# Patient Record
Sex: MALE | Race: BLACK OR AFRICAN AMERICAN | NOT HISPANIC OR LATINO | URBAN - METROPOLITAN AREA
[De-identification: names, ages, dates, MRNs, and addresses within clinical notes are randomized per-mention and may not be internally consistent; named-entity substitution may affect disease eponyms.]

---

## 2019-11-12 ENCOUNTER — INPATIENT (INPATIENT)
Facility: HOSPITAL | Age: 59
LOS: 2 days | Discharge: HOME | End: 2019-11-15
Attending: HOSPITALIST | Admitting: HOSPITALIST
Payer: SELF-PAY

## 2019-11-12 VITALS
TEMPERATURE: 99 F | SYSTOLIC BLOOD PRESSURE: 113 MMHG | DIASTOLIC BLOOD PRESSURE: 65 MMHG | RESPIRATION RATE: 16 BRPM | OXYGEN SATURATION: 100 % | HEART RATE: 122 BPM

## 2019-11-12 DIAGNOSIS — K92.2 GASTROINTESTINAL HEMORRHAGE, UNSPECIFIED: ICD-10-CM

## 2019-11-12 LAB
ALBUMIN SERPL ELPH-MCNC: 3.3 G/DL — SIGNIFICANT CHANGE UP (ref 3.3–5)
ALP SERPL-CCNC: 131 U/L — HIGH (ref 40–120)
ALT FLD-CCNC: 34 U/L — SIGNIFICANT CHANGE UP (ref 4–41)
ANION GAP SERPL CALC-SCNC: 16 MMO/L — HIGH (ref 7–14)
ANISOCYTOSIS BLD QL: SLIGHT — SIGNIFICANT CHANGE UP
APAP SERPL-MCNC: < 15 UG/ML — LOW (ref 15–25)
APTT BLD: 29 SEC — SIGNIFICANT CHANGE UP (ref 27.5–36.3)
AST SERPL-CCNC: 143 U/L — HIGH (ref 4–40)
BASE EXCESS BLDV CALC-SCNC: 2.6 MMOL/L — SIGNIFICANT CHANGE UP
BASE EXCESS BLDV CALC-SCNC: 5.3 MMOL/L — SIGNIFICANT CHANGE UP
BASOPHILS # BLD AUTO: 0.03 K/UL — SIGNIFICANT CHANGE UP (ref 0–0.2)
BASOPHILS NFR BLD AUTO: 0.3 % — SIGNIFICANT CHANGE UP (ref 0–2)
BILIRUB SERPL-MCNC: 1.7 MG/DL — HIGH (ref 0.2–1.2)
BLD GP AB SCN SERPL QL: NEGATIVE — SIGNIFICANT CHANGE UP
BLOOD GAS VENOUS - CREATININE: 0.64 MG/DL — SIGNIFICANT CHANGE UP (ref 0.5–1.3)
BLOOD GAS VENOUS - CREATININE: 0.72 MG/DL — SIGNIFICANT CHANGE UP (ref 0.5–1.3)
BLOOD GAS VENOUS - FIO2: 100 — SIGNIFICANT CHANGE UP
BUN SERPL-MCNC: 16 MG/DL — SIGNIFICANT CHANGE UP (ref 7–23)
CALCIUM SERPL-MCNC: 8.9 MG/DL — SIGNIFICANT CHANGE UP (ref 8.4–10.5)
CHLORIDE BLDV-SCNC: 104 MMOL/L — SIGNIFICANT CHANGE UP (ref 96–108)
CHLORIDE BLDV-SCNC: 98 MMOL/L — SIGNIFICANT CHANGE UP (ref 96–108)
CHLORIDE SERPL-SCNC: 95 MMOL/L — LOW (ref 98–107)
CO2 SERPL-SCNC: 28 MMOL/L — SIGNIFICANT CHANGE UP (ref 22–31)
CREAT SERPL-MCNC: 0.69 MG/DL — SIGNIFICANT CHANGE UP (ref 0.5–1.3)
EOSINOPHIL # BLD AUTO: 0 K/UL — SIGNIFICANT CHANGE UP (ref 0–0.5)
EOSINOPHIL NFR BLD AUTO: 0 % — SIGNIFICANT CHANGE UP (ref 0–6)
ETHANOL BLD-MCNC: < 10 MG/DL — SIGNIFICANT CHANGE UP
GAS PNL BLDV: 138 MMOL/L — SIGNIFICANT CHANGE UP (ref 136–146)
GAS PNL BLDV: 140 MMOL/L — SIGNIFICANT CHANGE UP (ref 136–146)
GLUCOSE BLDV-MCNC: 129 MG/DL — HIGH (ref 70–99)
GLUCOSE BLDV-MCNC: 82 MG/DL — SIGNIFICANT CHANGE UP (ref 70–99)
GLUCOSE SERPL-MCNC: 141 MG/DL — HIGH (ref 70–99)
HCO3 BLDV-SCNC: 26 MMOL/L — SIGNIFICANT CHANGE UP (ref 20–27)
HCO3 BLDV-SCNC: 28 MMOL/L — HIGH (ref 20–27)
HCT VFR BLD CALC: 25.1 % — LOW (ref 39–50)
HCT VFR BLDV CALC: 20.7 % — CRITICAL LOW (ref 39–51)
HCT VFR BLDV CALC: 26.8 % — LOW (ref 39–51)
HGB BLD-MCNC: 8.5 G/DL — LOW (ref 13–17)
HGB BLDV-MCNC: 6.6 G/DL — CRITICAL LOW (ref 13–17)
HGB BLDV-MCNC: 8.6 G/DL — LOW (ref 13–17)
HYPOCHROMIA BLD QL: SLIGHT — SIGNIFICANT CHANGE UP
IMM GRANULOCYTES NFR BLD AUTO: 0.4 % — SIGNIFICANT CHANGE UP (ref 0–1.5)
INR BLD: 1.73 — HIGH (ref 0.88–1.17)
LACTATE BLDV-MCNC: 2.4 MMOL/L — HIGH (ref 0.5–2)
LACTATE BLDV-MCNC: 4.2 MMOL/L — CRITICAL HIGH (ref 0.5–2)
LIDOCAIN IGE QN: 29.5 U/L — SIGNIFICANT CHANGE UP (ref 7–60)
LYMPHOCYTES # BLD AUTO: 1.97 K/UL — SIGNIFICANT CHANGE UP (ref 1–3.3)
LYMPHOCYTES # BLD AUTO: 18.4 % — SIGNIFICANT CHANGE UP (ref 13–44)
MAGNESIUM SERPL-MCNC: 1.2 MG/DL — LOW (ref 1.6–2.6)
MANUAL SMEAR VERIFICATION: SIGNIFICANT CHANGE UP
MCHC RBC-ENTMCNC: 33.5 PG — SIGNIFICANT CHANGE UP (ref 27–34)
MCHC RBC-ENTMCNC: 33.9 % — SIGNIFICANT CHANGE UP (ref 32–36)
MCV RBC AUTO: 98.8 FL — SIGNIFICANT CHANGE UP (ref 80–100)
MONOCYTES # BLD AUTO: 1.02 K/UL — HIGH (ref 0–0.9)
MONOCYTES NFR BLD AUTO: 9.5 % — SIGNIFICANT CHANGE UP (ref 2–14)
NEUTROPHILS # BLD AUTO: 7.66 K/UL — HIGH (ref 1.8–7.4)
NEUTROPHILS NFR BLD AUTO: 71.4 % — SIGNIFICANT CHANGE UP (ref 43–77)
NRBC # FLD: 0 K/UL — SIGNIFICANT CHANGE UP (ref 0–0)
PCO2 BLDV: 41 MMHG — SIGNIFICANT CHANGE UP (ref 41–51)
PCO2 BLDV: 44 MMHG — SIGNIFICANT CHANGE UP (ref 41–51)
PH BLDV: 7.41 PH — SIGNIFICANT CHANGE UP (ref 7.32–7.43)
PH BLDV: 7.46 PH — HIGH (ref 7.32–7.43)
PHOSPHATE SERPL-MCNC: 3.3 MG/DL — SIGNIFICANT CHANGE UP (ref 2.5–4.5)
PLATELET # BLD AUTO: 67 K/UL — LOW (ref 150–400)
PLATELET COUNT - ESTIMATE: SIGNIFICANT CHANGE UP
PMV BLD: 11.1 FL — SIGNIFICANT CHANGE UP (ref 7–13)
PO2 BLDV: 27 MMHG — LOW (ref 35–40)
PO2 BLDV: 46 MMHG — HIGH (ref 35–40)
POLYCHROMASIA BLD QL SMEAR: SLIGHT — SIGNIFICANT CHANGE UP
POTASSIUM BLDV-SCNC: 4.2 MMOL/L — SIGNIFICANT CHANGE UP (ref 3.4–4.5)
POTASSIUM BLDV-SCNC: 4.2 MMOL/L — SIGNIFICANT CHANGE UP (ref 3.4–4.5)
POTASSIUM SERPL-MCNC: 4.4 MMOL/L — SIGNIFICANT CHANGE UP (ref 3.5–5.3)
POTASSIUM SERPL-SCNC: 4.4 MMOL/L — SIGNIFICANT CHANGE UP (ref 3.5–5.3)
PROT SERPL-MCNC: 6.7 G/DL — SIGNIFICANT CHANGE UP (ref 6–8.3)
PROTHROM AB SERPL-ACNC: 19.5 SEC — HIGH (ref 9.8–13.1)
RBC # BLD: 2.54 M/UL — LOW (ref 4.2–5.8)
RBC # FLD: 15.9 % — HIGH (ref 10.3–14.5)
RH IG SCN BLD-IMP: POSITIVE — SIGNIFICANT CHANGE UP
SALICYLATES SERPL-MCNC: < 5 MG/DL — LOW (ref 15–30)
SAO2 % BLDV: 37.9 % — LOW (ref 60–85)
SAO2 % BLDV: 73.5 % — SIGNIFICANT CHANGE UP (ref 60–85)
SODIUM SERPL-SCNC: 139 MMOL/L — SIGNIFICANT CHANGE UP (ref 135–145)
WBC # BLD: 10.72 K/UL — HIGH (ref 3.8–10.5)
WBC # FLD AUTO: 10.72 K/UL — HIGH (ref 3.8–10.5)

## 2019-11-12 PROCEDURE — 76705 ECHO EXAM OF ABDOMEN: CPT | Mod: 26

## 2019-11-12 PROCEDURE — 74174 CTA ABD&PLVS W/CONTRAST: CPT | Mod: 26

## 2019-11-12 PROCEDURE — 99223 1ST HOSP IP/OBS HIGH 75: CPT

## 2019-11-12 RX ORDER — MORPHINE SULFATE 50 MG/1
4 CAPSULE, EXTENDED RELEASE ORAL ONCE
Refills: 0 | Status: DISCONTINUED | OUTPATIENT
Start: 2019-11-12 | End: 2019-11-12

## 2019-11-12 RX ORDER — INFLUENZA VIRUS VACCINE 15; 15; 15; 15 UG/.5ML; UG/.5ML; UG/.5ML; UG/.5ML
0.5 SUSPENSION INTRAMUSCULAR ONCE
Refills: 0 | Status: DISCONTINUED | OUTPATIENT
Start: 2019-11-12 | End: 2019-11-15

## 2019-11-12 RX ORDER — CEFTRIAXONE 500 MG/1
1000 INJECTION, POWDER, FOR SOLUTION INTRAMUSCULAR; INTRAVENOUS ONCE
Refills: 0 | Status: COMPLETED | OUTPATIENT
Start: 2019-11-12 | End: 2019-11-12

## 2019-11-12 RX ORDER — SODIUM CHLORIDE 9 MG/ML
1000 INJECTION INTRAMUSCULAR; INTRAVENOUS; SUBCUTANEOUS ONCE
Refills: 0 | Status: COMPLETED | OUTPATIENT
Start: 2019-11-12 | End: 2019-11-12

## 2019-11-12 RX ORDER — PANTOPRAZOLE SODIUM 20 MG/1
8 TABLET, DELAYED RELEASE ORAL
Qty: 80 | Refills: 0 | Status: DISCONTINUED | OUTPATIENT
Start: 2019-11-12 | End: 2019-11-13

## 2019-11-12 RX ORDER — OCTREOTIDE ACETATE 200 UG/ML
50 INJECTION, SOLUTION INTRAVENOUS; SUBCUTANEOUS ONCE
Refills: 0 | Status: COMPLETED | OUTPATIENT
Start: 2019-11-12 | End: 2019-11-12

## 2019-11-12 RX ORDER — ONDANSETRON 8 MG/1
4 TABLET, FILM COATED ORAL ONCE
Refills: 0 | Status: COMPLETED | OUTPATIENT
Start: 2019-11-12 | End: 2019-11-12

## 2019-11-12 RX ORDER — MAGNESIUM SULFATE 500 MG/ML
2 VIAL (ML) INJECTION ONCE
Refills: 0 | Status: COMPLETED | OUTPATIENT
Start: 2019-11-12 | End: 2019-11-12

## 2019-11-12 RX ORDER — OCTREOTIDE ACETATE 200 UG/ML
50 INJECTION, SOLUTION INTRAVENOUS; SUBCUTANEOUS
Qty: 500 | Refills: 0 | Status: DISCONTINUED | OUTPATIENT
Start: 2019-11-12 | End: 2019-11-13

## 2019-11-12 RX ORDER — CEFTRIAXONE 500 MG/1
1000 INJECTION, POWDER, FOR SOLUTION INTRAMUSCULAR; INTRAVENOUS ONCE
Refills: 0 | Status: DISCONTINUED | OUTPATIENT
Start: 2019-11-12 | End: 2019-11-12

## 2019-11-12 RX ADMIN — SODIUM CHLORIDE 1000 MILLILITER(S): 9 INJECTION INTRAMUSCULAR; INTRAVENOUS; SUBCUTANEOUS at 19:27

## 2019-11-12 RX ADMIN — OCTREOTIDE ACETATE 10 MICROGRAM(S)/HR: 200 INJECTION, SOLUTION INTRAVENOUS; SUBCUTANEOUS at 23:52

## 2019-11-12 RX ADMIN — ONDANSETRON 4 MILLIGRAM(S): 8 TABLET, FILM COATED ORAL at 17:10

## 2019-11-12 RX ADMIN — Medication 50 GRAM(S): at 21:21

## 2019-11-12 RX ADMIN — CEFTRIAXONE 100 MILLIGRAM(S): 500 INJECTION, POWDER, FOR SOLUTION INTRAMUSCULAR; INTRAVENOUS at 20:44

## 2019-11-12 RX ADMIN — SODIUM CHLORIDE 1000 MILLILITER(S): 9 INJECTION INTRAMUSCULAR; INTRAVENOUS; SUBCUTANEOUS at 17:10

## 2019-11-12 RX ADMIN — PANTOPRAZOLE SODIUM 10 MG/HR: 20 TABLET, DELAYED RELEASE ORAL at 17:34

## 2019-11-12 RX ADMIN — OCTREOTIDE ACETATE 50 MICROGRAM(S): 200 INJECTION, SOLUTION INTRAVENOUS; SUBCUTANEOUS at 19:37

## 2019-11-12 NOTE — H&P ADULT - PROBLEM SELECTOR PLAN 1
-2/2 hematemesis 2/2 variceal bleed Vs boerhaave syndrome Vs Danielle-Carpio tear Vs PUD  -NPO  -c/w PPI gtt  -c/w octreotide gtt  -2 large bore IV  -c/w IVF  -monitor hgb  -GI consult  -repeat cbc now 2/2 hematemesis 2/2 variceal bleed Vs Danielle-Carpio tear Vs PUD Vs Boerhaave syndrome. Currently HD stable.   -C/w protonix gtt  -C/w octreotide gtt for now, as pt with history of long history of excessive alcohol intake and labs indicating liver synthetic dysfunction, thus ?cirrhosis. CT/US with no evidence of cirrhosis, though imaging might not be sensitive enough.   -Monitor CBC q8hrs for now. Will repeat CBC overnight, and depending on delta, will transfuse pRBCs.   -GI consult in AM  -NPO for now pending GI consult  -2 large bore IVs  -Active type & screen

## 2019-11-12 NOTE — ED ADULT TRIAGE NOTE - CHIEF COMPLAINT QUOTE
Pt. c/o intermittent abdominal pain with n/v/d since yesterday. States he noticed blood in his vomit. No pmhx

## 2019-11-12 NOTE — H&P ADULT - PROBLEM SELECTOR PLAN 2
-2/2 hematemesis 2/2 variceal bleed Vs boerhaave syndrome Vs Danielle-Carpio   -management as above. -2/2 hematemesis 2/2 variceal bleed Vs boerhaave syndrome Vs Danielle-Carpio   -management as above.  -SBP prophylaxis with ceftriaxone. -Management as above for Acute blood loss anemia  -Given possibility of variceal bleed in setting of undiagnosed cirrhosis, SBP prophylaxis with ceftriaxone for now  -INR elevated to 1.73. Given unclear liver history and presently without any life-threatening/serious/active bleed, will monitor INR for now. Will given PO vitamin K 5 mg if INR trends higher and plan for invasive procedures.

## 2019-11-12 NOTE — H&P ADULT - PROBLEM SELECTOR PLAN 4
-pt with 5 shots of etoh per day now with abdominal distension, ?Cirrhosis  -Pt with high suspicion for cirrhosis, MELD-Na=16  -f/u RUQ US and Doppler  -If ascites, can consider diagnostic paracentesis. Low concern for SBP as pt is afebrile and hemodynamically stable. -pt with 5 shots of etoh per day now with abdominal distension, ?Cirrhosis  -Pt with high suspicion for cirrhosis, MELD-Na=16  -f/u RUQ US and Doppler  -If ascites, can consider diagnostic paracentesis. Low concern for SBP as pt is afebrile and hemodynamically stable.  -SBP PPx Pt with 5 drinks of gin daily, now with abdominal distention, ?cirrhosis  -Pt with high suspicion for cirrhosis, MELD-Na=16  -F/u abdominal US dopplers  -If ascites, can consider diagnostic paracentesis. Low concern for SBP, as pt is afebrile and hemodynamically stable.  -C/w SBP prophylaxis with ceftriaxone for now

## 2019-11-12 NOTE — ED PROVIDER NOTE - OBJECTIVE STATEMENT
59 year old male with no known PMH presents with abdominal pain and bloody emesis x 1 day. Patient states he began to throw up "coffee grounds" which eventually became shekhar blood after a few episodes. Reports black stool x 1 day. Describes abdominal pain as 7/10 sharp pain in the upper abdomen. States he drinks 4-5 liquor drinks every day for years. Denies chest pain, SOB, dizziness, fevers/chills, recent travel/antibiotic use, dysuria, hematuria, and loc.

## 2019-11-12 NOTE — H&P ADULT - PROBLEM SELECTOR PLAN 7
-platelets =69  -can be 2/2 liver dysfunction Vs possible splenomegaly   -keep> 50 in the setting of GIB. Platelets =69. Can be 2/2 liver dysfunction Vs possible sequestration/splenomegaly.   -Monitor and keep platelets > 50 in setting of GIB

## 2019-11-12 NOTE — H&P ADULT - NSHPSOCIALHISTORY_GEN_ALL_CORE
lives with wife, 5 etoh shots per day, active smoker 1ppd, denies illicit drug use. , lives with wife. Has 5 drinks of gin per day, active smoker 1ppd, denies illicit drug use.

## 2019-11-12 NOTE — ED PROVIDER NOTE - ATTENDING CONTRIBUTION TO CARE
dinora: decades of alcohol usage, drinks gin, last saw provider???.  vomiting coffee grounds and blood since last nite; dark stool since yesterday.  never told of liver disease.   exam: elevated HR. NAD. large firm liver.  Impression: UGI bleed by hx.  recc: check labs; consult GI; ppi to start. guaic.

## 2019-11-12 NOTE — ED PROVIDER NOTE - CLINICAL SUMMARY MEDICAL DECISION MAKING FREE TEXT BOX
59 year old male chronic drinker presents with epigastric pain, hematemesis and melena x1days. Tachycardic to 122 upon arrival. Vitals otherwise stable. Concerning for acute upper GI bleed vs esophageal varices. CBC, CMP, Lipase, Coags, Type and screen. CTA abdomen. Pain control, Zofran, Protonix, Octreotide, Fluids. Reassess.

## 2019-11-12 NOTE — H&P ADULT - ASSESSMENT
60 YO with no significant PMHx who presents with hematemesis, admitted for GI bleed. 60 YO M with no significant PMHx who presents with hematemesis, admitted for GI bleed. 58 yo man with history of alcohol abuse (~5 drinks daily of gin) who presents with hematemesis, admitted for GI bleed.

## 2019-11-12 NOTE — ED ADULT NURSE NOTE - OBJECTIVE STATEMENT
Break Shift RN: Pt received to spot 11 complaining of mid abd and LLQ pain that began yesterday. Pt currently rates pain as 3-4/10, describing discomfort at "sharp". Pt a&ox4 and ambulatory at baseline, skin intact, respirations even and unlabored, abd soft and non-distended. Pt denies fever, chills, n/v, dysuria and urinary frequency. Will await orders and continue to monitor. Break Shift RN: Pt received to spot 11 complaining of mid abd and LLQ pain that began yesterday. Pt currently rates pain as 3-4/10, describing discomfort at "sharp". Pt reports vomiting last night multiple times, states "bright red blood in vomit", notes that it amounted to about 1 bowlful. Pt also endorses to daily drinking, reports that he drinks gin and denies ever experiencing "withdrawal symptoms". Pt a&ox4 and ambulatory at baseline, skin intact, respirations even and unlabored, abd soft and non-distended. Pt denies fever, chills, n/v, dysuria and urinary frequency. Will await orders and continue to monitor.

## 2019-11-12 NOTE — H&P ADULT - PROBLEM SELECTOR PLAN 6
-No chemoPPx in the setting of GIB. SCDs for now -INR=1.73  -Likely 2/2 cirrhosis in the setting of alcohol abuse.   -Not on any blood thinners  -continue to monitor. INR=1.73, possibly indicating liver synthetic dysfunction vs. poor diet 2/2 alcohol abuse  -Not on any blood thinners  -Given unclear liver history and presently without any life-threatening/serious/active bleed, will monitor INR for now. Will given PO vitamin K 5 mg if INR trends higher and plan for invasive procedures.

## 2019-11-12 NOTE — H&P ADULT - HISTORY OF PRESENT ILLNESS
58 YO with no significant PMHx who presents with hematemesis since last night. Pt states that last night he noticed black colored hematemesis followed by red colored vomit last night and today. He had 3 episodes of red colored vomit today and states it was a "nice amount" but could not further quantify, as well as an episode of black colored stools. He denies any nausea or abd pain prior to the onset of this episodes, but notes that he was nauseous and had some abd pain after coming to the hospital. Prior to the onset of this episode, he notes that he has had an increasing abdominal girth for the past few months, and has 5 alcoholic shots per day for many years with the last drink last night, but denies any pain with food or weight loss. He denies any prior episodes, but states that he intermittently has some black colored stools over the past few years He denies any fever, lightheadedness, chest pain, palpitations, or shortness of breath.  In the ED: Tmax:98.7F , /63, , resp=12, sat =100% on RA. Labs: hgb = 8.5, lactate 4.2-->2.4 s/p 2L IVF. He was made NPO and started on a PPI drip and octreotide. 60 YO with no significant PMHx who presents with hematemesis since last night. Pt states that last night he noticed black colored hematemesis followed by red colored vomit last night and today. He had 3 episodes of red colored vomit today and states it was a "nice amount" but could not further quantify, as well as an episode of black colored stools. He denies any nausea or abd pain prior to the onset of this episodes, but notes that he was nauseous and had some abd pain after coming to the hospital. Prior to the onset of this episode, he notes that he has had an increasing abdominal girth for the past few months, and has 5 alcoholic shots per day for many years with the last drink last night, but denies any pain with food or weight loss. He denies any prior episodes, but states that he intermittently has some black colored stools over the past few years He denies any fever, lightheadedness, chest pain, palpitations, or shortness of breath.  In the ED: Tmax:98.7F , /63, , resp=12, sat =100% on RA. Labs: hgb = 8.5, lactate 4.2-->2.4 s/p 2L IVF. He was made NPO and started on a PPI drip and octreotide, as well as ceftriaxone 60 yo man with history of alcohol abuse (~5 drinks daily of gin) who presents with hematemesis since last night. Pt states that last night he noticed black colored hematemesis followed by red colored vomit last night and today. He had 3 episodes of red colored vomit today and states it was a "nice amount" but could not further quantify, as well as an episode of black colored stools. He denies any nausea or abdominal pain prior to the onset of this episodes, but notes that he was nauseous and had some abdominal pain after coming to the hospital. Prior to the onset of this episode, he notes that he has had  increasing abdominal girth for the past few months, and has 5 alcoholic shots per day for many years with his last drink Monday night, but denies any pain with food or weight loss. He denies any prior episodes, but states that he intermittently has some black colored stools over the past few years. No previous EGD or colonoscopy as per pt. He denies any fever, lightheadedness, chest pain, palpitations, or shortness of breath.    In the ED: Tmax:98.7F , /63, , resp=12, sat =100% on RA. Labs: hgb = 8.5, lactate 4.2-->2.4 s/p 2L IVF. He was made NPO and started on a PPI drip and octreotide, as well as ceftriaxone

## 2019-11-12 NOTE — H&P ADULT - NSHPREVIEWOFSYSTEMS_GEN_ALL_CORE
REVIEW OF SYSTEMS:    CONSTITUTIONAL: No weakness, fevers or chills  EYES/ENT: No visual changes;  No vertigo or throat pain   NECK: No pain or stiffness  RESPIRATORY: No cough, wheezing, hemoptysis; No shortness of breath  CARDIOVASCULAR: No chest pain or palpitations  GASTROINTESTINAL: + LUQ pain, abd distension , hematemesis, melena  GENITOURINARY: No dysuria, frequency or hematuria  NEUROLOGICAL: No numbness or weakness  SKIN: No itching, burning, rashes, or lesions   All other review of systems is negative unless indicated above. Constitutional: No weakness, fevers, chills, or weight loss  Eyes: No visual changes, double vision, or eye pain  Ears, Nose, Mouth, Throat: No runny nose, sinus pain, ear pain, tinnitus, sore throat, dysphagia, or odynophagia  Cardiovascular: No chest pain, palpitations, or LE edema  Respiratory: No cough, wheezing, hemoptysis, or shortness of breath  Gastrointestinal: +LUQ abdominal pain. +Nausea. +Hematemesis. +Melena. No diarrhea. No BRBPR.  Genitourinary: No dysuria, frequency, urgency, or hematuria  Musculoskeletal: No joint pain, joint swelling, or decreased ROM  Skin: No pruritus or rashes  Neurologic: No seizures, headache, syncope, paresthesias, numbness, or limb weakness  Psychiatric: No depression, anxiety, or agitation  Endocrine: No heat/cold intolerance, mood swings, sweats, polydipsia, or polyuria  Hematologic/lymphatic: No purpura, petechia, or prolonged or excessive bleeding after dental extraction / injury    Positives and pertinent negatives noted and all other systems negative.

## 2019-11-12 NOTE — ED ADULT NURSE REASSESSMENT NOTE - NS ED NURSE REASSESS COMMENT FT1
received report from Shell HICKEY. Pt is a/o x 3. no complaints of chest pain, headache, nausea, dizziness, vomiting, SOB, fever, chills   verbalized. Pt reports "feeling better".  Awaiting further orders. Will continue to monitor.

## 2019-11-12 NOTE — H&P ADULT - NSHPLABSRESULTS_GEN_ALL_CORE
8.5    10.72 )-----------( 67       ( 12 Nov 2019 17:04 )             25.1       11-12    139  |  95<L>  |  16  ----------------------------<  141<H>  4.4   |  28  |  0.69    Ca    8.9      12 Nov 2019 17:04  Phos  3.3     11-12  Mg     1.2     11-12    TPro  6.7  /  Alb  3.3  /  TBili  1.7<H>  /  DBili  x   /  AST  143<H>  /  ALT  34  /  AlkPhos  131<H>  11-12            EXAM:  CT ANGIO ABD PELV (W)AW IC        PROCEDURE DATE:  Nov 12 2019         INTERPRETATION:  CLINICAL INFORMATION: Vomiting blood. Abdominal pain.    COMPARISON: Ultrasound abdomen 11/12/2019.    PROCEDURE:   Unenhanced and enhanced helical images were obtained of the abdomen and   pelvis. Images were obtained before and after the uneventful   administration of nonionic intravenous contrast (90 cc Omnipaque 350 was   administered; 10 cc Omnipaque 350 was discarded). The postcontrast study   was obtained during the arterial and venous phase of imaging.    FINDINGS:    LOWER CHEST: The lung bases are clear. The visualized heart is normal in   size.    LIVER: Within normal limits.  BILE DUCTS: Normal caliber.  GALLBLADDER: Cholelithiasis.  SPLEEN: Within normal limits.  PANCREAS: Pancreas is normal. No infiltration of the peripancreatic fat.  ADRENALS: Within normal limits.  KIDNEYS/URETERS: Within normal limits.    BLADDER: The bladder is normal.  REPRODUCTIVE ORGANS: The prostate glandis unremarkable.    BOWEL: The stomach is decompressed. The small bowel is normal in caliber.   The large bowel is normal in caliber. No extravasation of intravenous   contrast there is convincingly shown. Appendix is not visualized.  PERITONEUM: No free air or free fluid.  VESSELS: The aorta is normal in caliber. The major branches of the aorta   are proximally patent. Atheromatous disease of the aorta. The portal vein   and hepatic veins are patent.  RETROPERITONEUM/LYMPH NODES: No lymphadenopathy.    ABDOMINAL WALL: Within normal limits.  BONES: Within normal limits.    IMPRESSION:     1.  No bowel obstruction.  2.  Cholelithiasis.  3.  The appendix is not visualized, however, there is no right lower   quadrant inflammation to suggest appendicitis.  4.  The pancreas is normal.                      PT/INR - ( 12 Nov 2019 17:04 )   PT: 19.5 SEC;   INR: 1.73          PTT - ( 12 Nov 2019 17:04 )  PTT:29.0 SEC 8.5    10.72 )-----------( 67       ( 12 Nov 2019 17:04 )             25.1       11-12    139  |  95<L>  |  16  ----------------------------<  141<H>  4.4   |  28  |  0.69    Ca    8.9      12 Nov 2019 17:04  Phos  3.3     11-12  Mg     1.2     11-12    TPro  6.7  /  Alb  3.3  /  TBili  1.7<H>  /  DBili  x   /  AST  143<H>  /  ALT  34  /  AlkPhos  131<H>  11-12        Imaging personally reviewed.  EXAM:  CT ANGIO ABD PELV (W)AW IC      PROCEDURE DATE:  Nov 12 2019     INTERPRETATION: CLINICAL INFORMATION: Vomiting blood. Abdominal pain.    COMPARISON: Ultrasound abdomen 11/12/2019.    PROCEDURE:   Unenhanced and enhanced helical images were obtained of the abdomen and   pelvis. Images were obtained before and after the uneventful   administration of nonionic intravenous contrast (90 cc Omnipaque 350 was   administered; 10 cc Omnipaque 350 was discarded). The postcontrast study   was obtained during the arterial and venous phase of imaging.    FINDINGS:    LOWER CHEST: The lung bases are clear. The visualized heart is normal in   size.    LIVER: Within normal limits.  BILE DUCTS: Normal caliber.  GALLBLADDER: Cholelithiasis.  SPLEEN: Within normal limits.  PANCREAS: Pancreas is normal. No infiltration of the peripancreatic fat.  ADRENALS: Within normal limits.  KIDNEYS/URETERS: Within normal limits.    BLADDER: The bladder is normal.  REPRODUCTIVE ORGANS: The prostate glandis unremarkable.    BOWEL: The stomach is decompressed. The small bowel is normal in caliber.   The large bowel is normal in caliber. No extravasation of intravenous   contrast there is convincingly shown. Appendix is not visualized.  PERITONEUM: No free air or free fluid.  VESSELS: The aorta is normal in caliber. The major branches of the aorta   are proximally patent. Atheromatous disease of the aorta. The portal vein   and hepatic veins are patent.  RETROPERITONEUM/LYMPH NODES: No lymphadenopathy.    ABDOMINAL WALL: Within normal limits.  BONES: Within normal limits.    IMPRESSION:     1.  No bowel obstruction.  2.  Cholelithiasis.  3.  The appendix is not visualized, however, there is no right lower   quadrant inflammation to suggest appendicitis.  4.  The pancreas is normal.    EKG personally reviewed.  Sinus tach at 106 bpm. ?TWI and STD in lead III. QTc 488 ms.

## 2019-11-12 NOTE — H&P ADULT - NSHPPHYSICALEXAM_GEN_ALL_CORE
.  VITAL SIGNS:  T(C): 36.8 (11-12-19 @ 22:41), Max: 37.1 (11-12-19 @ 14:35)  T(F): 98.3 (11-12-19 @ 22:41), Max: 98.7 (11-12-19 @ 14:35)  HR: 96 (11-12-19 @ 22:41) (96 - 122)  BP: 129/66 (11-12-19 @ 22:41) (113/65 - 132/77)  BP(mean): --  RR: 17 (11-12-19 @ 22:41) (16 - 17)  SpO2: 99% (11-12-19 @ 22:41) (99% - 100%)  Wt(kg): --    PHYSICAL EXAM:    Constitutional: resting comfortably in bed; NAD  Head: NC/AT  Eyes: PERRL, EOMI, anicteric sclera  ENT: no nasal discharge, MMM  Neck: supple; no JVD appreciated  Respiratory: Mild bibasilar crackles; no wheezing or rhonchi  no increased work of breathing  Cardiac: +S1/S2; RRR; no M/R/G  Gastrointestinal: soft but distended abdomen, mildy tender in the epigastric region. Reducible umbilical hernia. Liver Edge 4cm below rib cage.   Extremities: no cyanosis; no peripheral edema  Musculoskeletal: NROM x4; no joint swelling, tenderness or erythema  Vascular: 2+ radial, DP pulses B/L  Dermatologic: skin warm, dry and intact  Neurologic: Alert and oriented, no focal deficits appreciated  Psychiatric: affect and characteristics of appearance, verbalizations, behaviors are appropriate .  VITAL SIGNS:  T(C): 36.8 (11-12-19 @ 22:41), Max: 37.1 (11-12-19 @ 14:35)  T(F): 98.3 (11-12-19 @ 22:41), Max: 98.7 (11-12-19 @ 14:35)  HR: 96 (11-12-19 @ 22:41) (96 - 122)  BP: 129/66 (11-12-19 @ 22:41) (113/65 - 132/77)  BP(mean): --  RR: 17 (11-12-19 @ 22:41) (16 - 17)  SpO2: 99% (11-12-19 @ 22:41) (99% - 100%)  Wt(kg): --    PHYSICAL EXAM:    Constitutional: Awake and alert, NAD  Head: NC/AT  Eyes: PERRL, EOMI, anicteric sclera, no conjunctival pallor  Mouth: MMM, no oropharyngeal exudates  Neck: Supple, full ROM, trachea midline, no JVD, no LAD  Respiratory: Good inspiratory effort, mild bibasilar crackles, no wheezing or rhonchi  Cardiac: +S1/S2; RRR; no M/R/G. No LE edema b/l.  Gastrointestinal: Soft but distended abdomen, mildly tender in the epigastric region. Reducible umbilical hernia. Liver edge 4cm below rib cage.   Extremities: No cyanosis. 2+ peripheral pulses b/l.  Musculoskeletal: NROM x4; no joint swelling, tenderness or erythema  Skin: Warm, dry and intact. No rashes.  Neurologic: Alert and oriented x3, no focal deficits appreciated  Psychiatric: Affect and characteristics of appearance, verbalizations, behaviors are appropriate

## 2019-11-12 NOTE — H&P ADULT - PROBLEM SELECTOR PLAN 5
-AST>2x ALT, c/w alcohol induced hepatitis  -Discriminatory factor=31.1.  -Would consider steroids in this pt, will ask GI. -AST>2x ALT, c/w alcohol-induced hepatitis  -Discriminatory factor=31.1.  -Would consider steroids in this pt, will ask GI.

## 2019-11-12 NOTE — H&P ADULT - PROBLEM SELECTOR PLAN 8
-No chemoPPx in the setting of GIB. SCDs for now. -DVT ppx: No chemo ppx in the setting of GIB. SCDs for now.  -Diet: NPO pending GI consult

## 2019-11-12 NOTE — H&P ADULT - PROBLEM SELECTOR PLAN 3
-pt with 5 shots of etoh per day, last drink was last night.  -no history of etoh withdrawal  -monitor on ciwa. -pt with 5 shots of etoh per day, last drink was last night.  -no history of etoh withdrawal  -monitor on ciwa.  -started on thiamine IV x3 days, followed by PO  -started on folic acid. Pt with 5 drinks of gin daily, last drink was Monday night.  -No history of etoh withdrawal as per pt  -Monitor on symptom-triggered CIWA  -thiamine, folic acid, and MVI supplementation

## 2019-11-13 ENCOUNTER — RESULT REVIEW (OUTPATIENT)
Age: 59
End: 2019-11-13

## 2019-11-13 DIAGNOSIS — K92.2 GASTROINTESTINAL HEMORRHAGE, UNSPECIFIED: ICD-10-CM

## 2019-11-13 DIAGNOSIS — I85.00 ESOPHAGEAL VARICES WITHOUT BLEEDING: ICD-10-CM

## 2019-11-13 DIAGNOSIS — Z00.00 ENCOUNTER FOR GENERAL ADULT MEDICAL EXAMINATION WITHOUT ABNORMAL FINDINGS: ICD-10-CM

## 2019-11-13 DIAGNOSIS — R14.0 ABDOMINAL DISTENSION (GASEOUS): ICD-10-CM

## 2019-11-13 DIAGNOSIS — Z90.49 ACQUIRED ABSENCE OF OTHER SPECIFIED PARTS OF DIGESTIVE TRACT: Chronic | ICD-10-CM

## 2019-11-13 DIAGNOSIS — K76.89 OTHER SPECIFIED DISEASES OF LIVER: ICD-10-CM

## 2019-11-13 DIAGNOSIS — D68.9 COAGULATION DEFECT, UNSPECIFIED: ICD-10-CM

## 2019-11-13 DIAGNOSIS — K27.3 ACUTE PEPTIC ULCER, SITE UNSPECIFIED, WITHOUT HEMORRHAGE OR PERFORATION: ICD-10-CM

## 2019-11-13 DIAGNOSIS — D62 ACUTE POSTHEMORRHAGIC ANEMIA: ICD-10-CM

## 2019-11-13 DIAGNOSIS — Z98.890 OTHER SPECIFIED POSTPROCEDURAL STATES: Chronic | ICD-10-CM

## 2019-11-13 DIAGNOSIS — D69.6 THROMBOCYTOPENIA, UNSPECIFIED: ICD-10-CM

## 2019-11-13 DIAGNOSIS — F10.10 ALCOHOL ABUSE, UNCOMPLICATED: ICD-10-CM

## 2019-11-13 DIAGNOSIS — Z29.9 ENCOUNTER FOR PROPHYLACTIC MEASURES, UNSPECIFIED: ICD-10-CM

## 2019-11-13 DIAGNOSIS — R74.0 NONSPECIFIC ELEVATION OF LEVELS OF TRANSAMINASE AND LACTIC ACID DEHYDROGENASE [LDH]: ICD-10-CM

## 2019-11-13 LAB
ALBUMIN SERPL ELPH-MCNC: 2.9 G/DL — LOW (ref 3.3–5)
ALP SERPL-CCNC: 104 U/L — SIGNIFICANT CHANGE UP (ref 40–120)
ALT FLD-CCNC: 29 U/L — SIGNIFICANT CHANGE UP (ref 4–41)
ANION GAP SERPL CALC-SCNC: 13 MMO/L — SIGNIFICANT CHANGE UP (ref 7–14)
APPEARANCE UR: CLEAR — SIGNIFICANT CHANGE UP
AST SERPL-CCNC: 119 U/L — HIGH (ref 4–40)
BACTERIA # UR AUTO: NEGATIVE — SIGNIFICANT CHANGE UP
BILIRUB SERPL-MCNC: 1.4 MG/DL — HIGH (ref 0.2–1.2)
BILIRUB UR-MCNC: NEGATIVE — SIGNIFICANT CHANGE UP
BLD GP AB SCN SERPL QL: NEGATIVE — SIGNIFICANT CHANGE UP
BLOOD UR QL VISUAL: HIGH
BUN SERPL-MCNC: 15 MG/DL — SIGNIFICANT CHANGE UP (ref 7–23)
CALCIUM SERPL-MCNC: 7.7 MG/DL — LOW (ref 8.4–10.5)
CHLORIDE SERPL-SCNC: 100 MMOL/L — SIGNIFICANT CHANGE UP (ref 98–107)
CO2 SERPL-SCNC: 25 MMOL/L — SIGNIFICANT CHANGE UP (ref 22–31)
COLOR SPEC: YELLOW — SIGNIFICANT CHANGE UP
CREAT SERPL-MCNC: 0.75 MG/DL — SIGNIFICANT CHANGE UP (ref 0.5–1.3)
GLUCOSE BLDC GLUCOMTR-MCNC: 89 MG/DL — SIGNIFICANT CHANGE UP (ref 70–99)
GLUCOSE SERPL-MCNC: 65 MG/DL — LOW (ref 70–99)
GLUCOSE UR-MCNC: NEGATIVE — SIGNIFICANT CHANGE UP
HCT VFR BLD CALC: 20.5 % — CRITICAL LOW (ref 39–50)
HCT VFR BLD CALC: 24 % — LOW (ref 39–50)
HCT VFR BLD CALC: 26.3 % — LOW (ref 39–50)
HCV AB S/CO SERPL IA: 0.14 S/CO — SIGNIFICANT CHANGE UP (ref 0–0.99)
HCV AB SERPL-IMP: SIGNIFICANT CHANGE UP
HGB BLD-MCNC: 7.3 G/DL — LOW (ref 13–17)
HGB BLD-MCNC: 8.4 G/DL — LOW (ref 13–17)
HGB BLD-MCNC: 9.1 G/DL — LOW (ref 13–17)
HYALINE CASTS # UR AUTO: NEGATIVE — SIGNIFICANT CHANGE UP
KETONES UR-MCNC: SIGNIFICANT CHANGE UP
LACTATE SERPL-SCNC: 1 MMOL/L — SIGNIFICANT CHANGE UP (ref 0.5–2)
LACTATE SERPL-SCNC: 1.2 MMOL/L — SIGNIFICANT CHANGE UP (ref 0.5–2)
LEUKOCYTE ESTERASE UR-ACNC: NEGATIVE — SIGNIFICANT CHANGE UP
LIDOCAIN IGE QN: 31.9 U/L — SIGNIFICANT CHANGE UP (ref 7–60)
MAGNESIUM SERPL-MCNC: 1.9 MG/DL — SIGNIFICANT CHANGE UP (ref 1.6–2.6)
MAGNESIUM SERPL-MCNC: 2 MG/DL — SIGNIFICANT CHANGE UP (ref 1.6–2.6)
MCHC RBC-ENTMCNC: 34.3 PG — HIGH (ref 27–34)
MCHC RBC-ENTMCNC: 34.6 % — SIGNIFICANT CHANGE UP (ref 32–36)
MCHC RBC-ENTMCNC: 34.9 PG — HIGH (ref 27–34)
MCHC RBC-ENTMCNC: 35 % — SIGNIFICANT CHANGE UP (ref 32–36)
MCHC RBC-ENTMCNC: 35.6 % — SIGNIFICANT CHANGE UP (ref 32–36)
MCHC RBC-ENTMCNC: 35.8 PG — HIGH (ref 27–34)
MCV RBC AUTO: 100.5 FL — HIGH (ref 80–100)
MCV RBC AUTO: 99.2 FL — SIGNIFICANT CHANGE UP (ref 80–100)
MCV RBC AUTO: 99.6 FL — SIGNIFICANT CHANGE UP (ref 80–100)
NITRITE UR-MCNC: NEGATIVE — SIGNIFICANT CHANGE UP
NRBC # FLD: 0 K/UL — SIGNIFICANT CHANGE UP (ref 0–0)
PH UR: 5.5 — SIGNIFICANT CHANGE UP (ref 5–8)
PHOSPHATE SERPL-MCNC: 2.3 MG/DL — LOW (ref 2.5–4.5)
PHOSPHATE SERPL-MCNC: 3.1 MG/DL — SIGNIFICANT CHANGE UP (ref 2.5–4.5)
PLATELET # BLD AUTO: 50 K/UL — LOW (ref 150–400)
PLATELET # BLD AUTO: 53 K/UL — LOW (ref 150–400)
PLATELET # BLD AUTO: 58 K/UL — LOW (ref 150–400)
PMV BLD: 10 FL — SIGNIFICANT CHANGE UP (ref 7–13)
PMV BLD: 10.4 FL — SIGNIFICANT CHANGE UP (ref 7–13)
PMV BLD: 10.5 FL — SIGNIFICANT CHANGE UP (ref 7–13)
POTASSIUM SERPL-MCNC: 4.2 MMOL/L — SIGNIFICANT CHANGE UP (ref 3.5–5.3)
POTASSIUM SERPL-SCNC: 4.2 MMOL/L — SIGNIFICANT CHANGE UP (ref 3.5–5.3)
PROT SERPL-MCNC: 5.8 G/DL — LOW (ref 6–8.3)
PROT UR-MCNC: NEGATIVE — SIGNIFICANT CHANGE UP
RBC # BLD: 2.04 M/UL — LOW (ref 4.2–5.8)
RBC # BLD: 2.41 M/UL — LOW (ref 4.2–5.8)
RBC # BLD: 2.65 M/UL — LOW (ref 4.2–5.8)
RBC # FLD: 15.8 % — HIGH (ref 10.3–14.5)
RBC # FLD: 16.2 % — HIGH (ref 10.3–14.5)
RBC # FLD: 16.5 % — HIGH (ref 10.3–14.5)
RBC CASTS # UR COMP ASSIST: HIGH (ref 0–?)
RH IG SCN BLD-IMP: POSITIVE — SIGNIFICANT CHANGE UP
SODIUM SERPL-SCNC: 138 MMOL/L — SIGNIFICANT CHANGE UP (ref 135–145)
SP GR SPEC: 1.02 — SIGNIFICANT CHANGE UP (ref 1–1.04)
SQUAMOUS # UR AUTO: SIGNIFICANT CHANGE UP
UROBILINOGEN FLD QL: SIGNIFICANT CHANGE UP
WBC # BLD: 7.85 K/UL — SIGNIFICANT CHANGE UP (ref 3.8–10.5)
WBC # BLD: 8.43 K/UL — SIGNIFICANT CHANGE UP (ref 3.8–10.5)
WBC # BLD: 9.74 K/UL — SIGNIFICANT CHANGE UP (ref 3.8–10.5)
WBC # FLD AUTO: 7.85 K/UL — SIGNIFICANT CHANGE UP (ref 3.8–10.5)
WBC # FLD AUTO: 8.43 K/UL — SIGNIFICANT CHANGE UP (ref 3.8–10.5)
WBC # FLD AUTO: 9.74 K/UL — SIGNIFICANT CHANGE UP (ref 3.8–10.5)
WBC UR QL: SIGNIFICANT CHANGE UP (ref 0–?)

## 2019-11-13 PROCEDURE — 99233 SBSQ HOSP IP/OBS HIGH 50: CPT | Mod: GC

## 2019-11-13 PROCEDURE — 88312 SPECIAL STAINS GROUP 1: CPT | Mod: 26

## 2019-11-13 PROCEDURE — 88305 TISSUE EXAM BY PATHOLOGIST: CPT | Mod: 26

## 2019-11-13 PROCEDURE — 74018 RADEX ABDOMEN 1 VIEW: CPT | Mod: 26

## 2019-11-13 PROCEDURE — 99223 1ST HOSP IP/OBS HIGH 75: CPT | Mod: GC

## 2019-11-13 PROCEDURE — 71045 X-RAY EXAM CHEST 1 VIEW: CPT | Mod: 26

## 2019-11-13 PROCEDURE — 43239 EGD BIOPSY SINGLE/MULTIPLE: CPT

## 2019-11-13 RX ORDER — PHYTONADIONE (VIT K1) 5 MG
5 TABLET ORAL DAILY
Refills: 0 | Status: DISCONTINUED | OUTPATIENT
Start: 2019-11-13 | End: 2019-11-13

## 2019-11-13 RX ORDER — PANTOPRAZOLE SODIUM 20 MG/1
40 TABLET, DELAYED RELEASE ORAL
Refills: 0 | Status: DISCONTINUED | OUTPATIENT
Start: 2019-11-13 | End: 2019-11-15

## 2019-11-13 RX ORDER — CEFTRIAXONE 500 MG/1
1000 INJECTION, POWDER, FOR SOLUTION INTRAMUSCULAR; INTRAVENOUS EVERY 24 HOURS
Refills: 0 | Status: DISCONTINUED | OUTPATIENT
Start: 2019-11-13 | End: 2019-11-15

## 2019-11-13 RX ORDER — ACETAMINOPHEN 500 MG
650 TABLET ORAL EVERY 6 HOURS
Refills: 0 | Status: DISCONTINUED | OUTPATIENT
Start: 2019-11-13 | End: 2019-11-13

## 2019-11-13 RX ORDER — SODIUM CHLORIDE 9 MG/ML
1000 INJECTION INTRAMUSCULAR; INTRAVENOUS; SUBCUTANEOUS
Refills: 0 | Status: DISCONTINUED | OUTPATIENT
Start: 2019-11-13 | End: 2019-11-13

## 2019-11-13 RX ORDER — PROPRANOLOL HCL 160 MG
20 CAPSULE, EXTENDED RELEASE 24HR ORAL
Refills: 0 | Status: DISCONTINUED | OUTPATIENT
Start: 2019-11-13 | End: 2019-11-13

## 2019-11-13 RX ORDER — THIAMINE MONONITRATE (VIT B1) 100 MG
500 TABLET ORAL DAILY
Refills: 0 | Status: DISCONTINUED | OUTPATIENT
Start: 2019-11-13 | End: 2019-11-13

## 2019-11-13 RX ORDER — PROPRANOLOL HCL 160 MG
20 CAPSULE, EXTENDED RELEASE 24HR ORAL
Refills: 0 | Status: DISCONTINUED | OUTPATIENT
Start: 2019-11-13 | End: 2019-11-15

## 2019-11-13 RX ORDER — THIAMINE MONONITRATE (VIT B1) 100 MG
100 TABLET ORAL DAILY
Refills: 0 | Status: DISCONTINUED | OUTPATIENT
Start: 2019-11-13 | End: 2019-11-15

## 2019-11-13 RX ORDER — PREGABALIN 225 MG/1
1000 CAPSULE ORAL DAILY
Refills: 0 | Status: DISCONTINUED | OUTPATIENT
Start: 2019-11-13 | End: 2019-11-13

## 2019-11-13 RX ORDER — ACETAMINOPHEN 500 MG
650 TABLET ORAL EVERY 6 HOURS
Refills: 0 | Status: COMPLETED | OUTPATIENT
Start: 2019-11-13 | End: 2020-10-11

## 2019-11-13 RX ORDER — FOLIC ACID 0.8 MG
1 TABLET ORAL DAILY
Refills: 0 | Status: DISCONTINUED | OUTPATIENT
Start: 2019-11-13 | End: 2019-11-15

## 2019-11-13 RX ORDER — PHYTONADIONE (VIT K1) 5 MG
5 TABLET ORAL DAILY
Refills: 0 | Status: COMPLETED | OUTPATIENT
Start: 2019-11-13 | End: 2019-11-14

## 2019-11-13 RX ORDER — IBUPROFEN 200 MG
400 TABLET ORAL ONCE
Refills: 0 | Status: DISCONTINUED | OUTPATIENT
Start: 2019-11-13 | End: 2019-11-14

## 2019-11-13 RX ADMIN — Medication 5 MILLIGRAM(S): at 18:54

## 2019-11-13 RX ADMIN — PREGABALIN 1000 MICROGRAM(S): 225 CAPSULE ORAL at 02:12

## 2019-11-13 RX ADMIN — CEFTRIAXONE 100 MILLIGRAM(S): 500 INJECTION, POWDER, FOR SOLUTION INTRAMUSCULAR; INTRAVENOUS at 20:20

## 2019-11-13 RX ADMIN — PANTOPRAZOLE SODIUM 40 MILLIGRAM(S): 20 TABLET, DELAYED RELEASE ORAL at 18:54

## 2019-11-13 RX ADMIN — Medication 105 MILLIGRAM(S): at 02:12

## 2019-11-13 RX ADMIN — Medication 1 MILLIGRAM(S): at 18:54

## 2019-11-13 RX ADMIN — Medication 650 MILLIGRAM(S): at 18:54

## 2019-11-13 RX ADMIN — Medication 100 MILLIGRAM(S): at 18:54

## 2019-11-13 RX ADMIN — Medication 1 TABLET(S): at 18:54

## 2019-11-13 RX ADMIN — SODIUM CHLORIDE 100 MILLILITER(S): 9 INJECTION INTRAMUSCULAR; INTRAVENOUS; SUBCUTANEOUS at 03:21

## 2019-11-13 RX ADMIN — Medication 20 MILLIGRAM(S): at 18:54

## 2019-11-13 NOTE — CONSULT NOTE ADULT - ATTENDING COMMENTS
Patient was seen and examined with hepatology team on rounds. Agree with above.  A 59 year-old man was seen for alcohol associated liver disease, fatty liver, complicated by thrombocytopenia, and prolonged INR and hematemesis and melena.   DF was 36, No radiological evidence of cirrhosis.   Will recommend  -trend CBC, liver tests, INR, BT to less Hb 8.0, urgent EGD. empiric treatment for possible esophageal varices.  -workup for concurrent liver disease.

## 2019-11-13 NOTE — PROGRESS NOTE ADULT - SUBJECTIVE AND OBJECTIVE BOX
***************************************************************  COVERING RESIDENT: Ivette Lei, PGY 1  Internal Medicine   Pager: RAUL [phone] | [facility]: [phone]  ***************************************************************    LILIBETH US is a 59y year old Male with a PMHx of  presenting with  secondary to DIFFERENTIAL1 vs DIFFERENTIAL2 vs DIFFERENTIAL3.  ****Alternatively, REFER TO ONE-LINER FROM PREVIOUS NOTE***    INTERVAL HPI/OVERNIGHT EVENTS:   No acute events overnight. LILIBETH US endorses improvement/worsening of his .     ROS: He otherwise denies any fevers, chills, headache, sore throat, cough, shortness of breath, chest pain, palpitations, abdominal pain, constipation, diarrhea, or extremity swelling.    ALLERGIES  Allergies    No Known Allergies    Intolerances        MEDICATIONS (STANDING & PRN)  MEDICATIONS  (STANDING):  cefTRIAXone   IVPB 1000 milliGRAM(s) IV Intermittent every 24 hours  cyanocobalamin 1000 MICROGram(s) Oral daily  folic acid 1 milliGRAM(s) Oral daily  influenza   Vaccine 0.5 milliLiter(s) IntraMuscular once  multivitamin 1 Tablet(s) Oral daily  octreotide  Infusion 50 MICROgram(s)/Hr (10 mL/Hr) IV Continuous <Continuous>  pantoprazole Infusion 8 mG/Hr (10 mL/Hr) IV Continuous <Continuous>  thiamine 100 milliGRAM(s) Oral daily    MEDICATIONS  (PRN):  LORazepam   Injectable 2 milliGRAM(s) IV Push every 1 hour PRN Symptom-triggered: each CIWA -Ar score 8 or GREATER      OBJECTIVE    VITAL SIGNS  Vital Signs Last 24 Hrs  T(C): 36.6 (13 Nov 2019 06:15), Max: 37.1 (12 Nov 2019 14:35)  T(F): 97.8 (13 Nov 2019 06:15), Max: 98.7 (12 Nov 2019 14:35)  HR: 92 (13 Nov 2019 06:15) (88 - 122)  BP: 123/65 (13 Nov 2019 06:15) (113/65 - 132/77)  BP(mean): --  RR: 16 (13 Nov 2019 06:15) (16 - 17)  SpO2: 98% (13 Nov 2019 06:15) (97% - 100%)    Daily Height in cm: 167.64 (12 Nov 2019 22:41)    Daily     PHYSICAL EXAM:  ***REFER TO PREVIOUS P/E ON ADMISSION***    LABS:                        7.3    9.74  )-----------( 53       ( 13 Nov 2019 01:00 )             20.5     11-13    138  |  100  |  15  ----------------------------<  65<L>  4.2   |  25  |  0.75    Ca    7.7<L>      13 Nov 2019 01:00  Phos  3.1     11-13  Mg     1.9     11-13    TPro  5.8<L>  /  Alb  2.9<L>  /  TBili  1.4<H>  /  DBili  x   /  AST  119<H>  /  ALT  29  /  AlkPhos  104  11-13    LIVER FUNCTIONS - ( 13 Nov 2019 01:00 )  Alb: 2.9 g/dL / Pro: 5.8 g/dL / ALK PHOS: 104 u/L / ALT: 29 u/L / AST: 119 u/L / GGT: x           PT/INR - ( 12 Nov 2019 17:04 )   PT: 19.5 SEC;   INR: 1.73          PTT - ( 12 Nov 2019 17:04 )  PTT:29.0 SEC        CAPILLARY BLOOD GLUCOSE          Arterial Blood Gas    Lactate, Blood: 1.2 mmol/L (11-13-19 @ 01:00)          RADIOLOGY & ADDITIONAL TESTS:        Imaging Personally Reviewed:  [ ] YES  [ ] NO    Consultant(s) Notes Reviewed:  [x] YES  [ ] NO  Care Discussed with Consultants/Other Providers [x] YES  [ ] NO ***************************************************************  COVERING RESIDENT: Ivette Lei, PGY 1  Internal Medicine   Pager: RAUL [phone] | [facility]: [phone]  ***************************************************************    59 y.o male presenting with hematemesis.     Interval HPI/Overnight events    ROS: He otherwise denies any fevers, chills, headache, sore throat, cough, shortness of breath, chest pain, palpitations, abdominal pain, constipation, diarrhea, or extremity swelling.      MEDICATIONS (STANDING & PRN)  MEDICATIONS  (STANDING):  cefTRIAXone   IVPB 1000 milliGRAM(s) IV Intermittent every 24 hours  cyanocobalamin 1000 MICROGram(s) Oral daily  folic acid 1 milliGRAM(s) Oral daily  influenza   Vaccine 0.5 milliLiter(s) IntraMuscular once  multivitamin 1 Tablet(s) Oral daily  octreotide  Infusion 50 MICROgram(s)/Hr (10 mL/Hr) IV Continuous <Continuous>  pantoprazole Infusion 8 mG/Hr (10 mL/Hr) IV Continuous <Continuous>  thiamine 100 milliGRAM(s) Oral daily    MEDICATIONS  (PRN):  LORazepam   Injectable 2 milliGRAM(s) IV Push every 1 hour PRN Symptom-triggered: each CIWA -Ar score 8 or GREATER      OBJECTIVE  VITAL SIGNS  Vital Signs Last 24 Hrs  T(C): 36.6 (13 Nov 2019 06:15), Max: 37.1 (12 Nov 2019 14:35)  T(F): 97.8 (13 Nov 2019 06:15), Max: 98.7 (12 Nov 2019 14:35)  HR: 92 (13 Nov 2019 06:15) (88 - 122)  BP: 123/65 (13 Nov 2019 06:15) (113/65 - 132/77)  BP(mean): --  RR: 16 (13 Nov 2019 06:15) (16 - 17)  SpO2: 98% (13 Nov 2019 06:15) (97% - 100%)    Daily Height in cm: 167.64 (12 Nov 2019 22:41)    Daily     General:  HEENT:  Pulm/chest:  Cardiac:  Abdomen:  Extremities:  Skin  Neuro:  Psych:    LABS:                        7.3    9.74  )-----------( 53       ( 13 Nov 2019 01:00 )             20.5     11-13    138  |  100  |  15  ----------------------------<  65<L>  4.2   |  25  |  0.75    Ca    7.7<L>      13 Nov 2019 01:00  Phos  3.1     11-13  Mg     1.9     11-13    TPro  5.8<L>  /  Alb  2.9<L>  /  TBili  1.4<H>  /  DBili  x   /  AST  119<H>  /  ALT  29  /  AlkPhos  104  11-13    LIVER FUNCTIONS - ( 13 Nov 2019 01:00 )  Alb: 2.9 g/dL / Pro: 5.8 g/dL / ALK PHOS: 104 u/L / ALT: 29 u/L / AST: 119 u/L / GGT: x           PT/INR - ( 12 Nov 2019 17:04 )   PT: 19.5 SEC;   INR: 1.73     PTT - ( 12 Nov 2019 17:04 )  PTT:29.0 SEC          Lactate, Blood: 1.2 mmol/L (11-13-19 @ 01:00)          RADIOLOGY & ADDITIONAL TESTS:  CTA A/P with IV Con  FINDINGS:    LOWER CHEST: The lung bases are clear. The visualized heart is normal in   size.    LIVER: Within normal limits.  BILE DUCTS: Normal caliber.  GALLBLADDER: Cholelithiasis.  SPLEEN: Within normal limits.  PANCREAS: Pancreas is normal. No infiltration of the peripancreatic fat.  ADRENALS: Within normal limits.  KIDNEYS/URETERS: Within normal limits.    BLADDER: The bladder is normal.  REPRODUCTIVE ORGANS: The prostate gland is unremarkable.    BOWEL: The stomach is decompressed. The small bowel is normal in caliber.   The large bowel is normal in caliber. No extravasation of intravenous   contrast there is convincingly shown. Appendix is not visualized.  PERITONEUM: No free air or free fluid.  VESSELS: The aorta is normal in caliber. The major branches of the aorta   are proximally patent. Atheromatous disease of the aorta. The portal vein   and hepatic veins are patent.  RETROPERITONEUM/LYMPH NODES: No lymphadenopathy.    ABDOMINAL WALL: Within normal limits.  BONES: Within normal limits.    IMPRESSION:     1.  No bowel obstruction.  2.  Cholelithiasis.  3.  The appendix is not visualized, however, there is no right lower   quadrant inflammation to suggest appendicitis.  4.  The pancreas is normal.    Imaging Personally Reviewed:  [ ] YES  [ ] NO    Consultant(s) Notes Reviewed:  [x] YES  [ ] NO  Care Discussed with Consultants/Other Providers [x] YES  [ ] NO ***************************************************************  COVERING RESIDENT: Ivette Lei, PGY 1  Internal Medicine   Pager: RAUL [phone] | [facility]: [phone]  ***************************************************************    59 y.o male presenting with hematemesis.     Interval HPI/Overnight events    ROS: He otherwise denies any fevers, chills, headache, sore throat, cough, shortness of breath, chest pain, palpitations, abdominal pain, constipation, diarrhea, or extremity swelling.      MEDICATIONS (STANDING & PRN)  MEDICATIONS  (STANDING):  cefTRIAXone   IVPB 1000 milliGRAM(s) IV Intermittent every 24 hours  cyanocobalamin 1000 MICROGram(s) Oral daily  folic acid 1 milliGRAM(s) Oral daily  influenza   Vaccine 0.5 milliLiter(s) IntraMuscular once  multivitamin 1 Tablet(s) Oral daily  octreotide  Infusion 50 MICROgram(s)/Hr (10 mL/Hr) IV Continuous <Continuous>  pantoprazole Infusion 8 mG/Hr (10 mL/Hr) IV Continuous <Continuous>  thiamine 100 milliGRAM(s) Oral daily    MEDICATIONS  (PRN):  LORazepam   Injectable 2 milliGRAM(s) IV Push every 1 hour PRN Symptom-triggered: each CIWA -Ar score 8 or GREATER      OBJECTIVE  VITAL SIGNS  Vital Signs Last 24 Hrs  T(C): 36.6 (13 Nov 2019 06:15), Max: 37.1 (12 Nov 2019 14:35)  T(F): 97.8 (13 Nov 2019 06:15), Max: 98.7 (12 Nov 2019 14:35)  HR: 92 (13 Nov 2019 06:15) (88 - 122)  BP: 123/65 (13 Nov 2019 06:15) (113/65 - 132/77)  BP(mean): --  RR: 16 (13 Nov 2019 06:15) (16 - 17)  SpO2: 98% (13 Nov 2019 06:15) (97% - 100%)    Daily Height in cm: 167.64 (12 Nov 2019 22:41)    Daily     General:  HEENT:  Pulm/chest:  Cardiac:  Abdomen:  Extremities:  Skin  Neuro:  Psych:    LABS:                                   8.4    8.43  )-----------( 50       ( 13 Nov 2019 07:05 )             24.0     11-13    138  |  100  |  15  ----------------------------<  65<L>  4.2   |  25  |  0.75    Ca    7.7<L>      13 Nov 2019 01:00  Phos  2.3     11-13  Mg     2.0     11-13    TPro  5.8<L>  /  Alb  2.9<L>  /  TBili  1.4<H>  /  DBili  x   /  AST  119<H>  /  ALT  29  /  AlkPhos  104  11-13      LIVER FUNCTIONS - ( 13 Nov 2019 01:00 )  Alb: 2.9 g/dL / Pro: 5.8 g/dL / ALK PHOS: 104 u/L / ALT: 29 u/L / AST: 119 u/L / GGT: x           PT/INR - ( 12 Nov 2019 17:04 )   PT: 19.5 SEC;   INR: 1.73     PTT - ( 12 Nov 2019 17:04 )  PTT:29.0 SEC          Lactate, Blood: 1.2 mmol/L (11-13-19 @ 01:00)      RADIOLOGY & ADDITIONAL TESTS:  CTA A/P with IV Con  FINDINGS:    LOWER CHEST: The lung bases are clear. The visualized heart is normal in   size.    LIVER: Within normal limits.  BILE DUCTS: Normal caliber.  GALLBLADDER: Cholelithiasis.  SPLEEN: Within normal limits.  PANCREAS: Pancreas is normal. No infiltration of the peripancreatic fat.  ADRENALS: Within normal limits.  KIDNEYS/URETERS: Within normal limits.    BLADDER: The bladder is normal.  REPRODUCTIVE ORGANS: The prostate gland is unremarkable.    BOWEL: The stomach is decompressed. The small bowel is normal in caliber.   The large bowel is normal in caliber. No extravasation of intravenous   contrast there is convincingly shown. Appendix is not visualized.  PERITONEUM: No free air or free fluid.  VESSELS: The aorta is normal in caliber. The major branches of the aorta   are proximally patent. Atheromatous disease of the aorta. The portal vein   and hepatic veins are patent.  RETROPERITONEUM/LYMPH NODES: No lymphadenopathy.    ABDOMINAL WALL: Within normal limits.  BONES: Within normal limits.    IMPRESSION:     1.  No bowel obstruction.  2.  Cholelithiasis.  3.  The appendix is not visualized, however, there is no right lower   quadrant inflammation to suggest appendicitis.  4.  The pancreas is normal.        Abdominal exam  FINDINGS:    Liver: Hepatic steatosis.    Bile ducts: Normal caliber. Common bile duct measures 5 mm.     Gallbladder: Intraluminal echogenic shadowing foci compatible with   cholelithiasis. The gallbladder is nondistended. The no gallbladder wall   thickening. The gallbladder wall measures 2 mm. No positive sonographic   Morrow sign.        Pancreas: Visualized portions are within normal limits.    Right kidney: 13 point cm. No hydronephrosis.     Ascites: Trace perihepatic ascites.    IVC: Visualized portions are within normal limits.    IMPRESSION:     1.  No convincing sonographic findings to suggest acute cholecystitis.  2.  Cholelithiasis    Imaging Personally Reviewed:  [ ] YES  [ ] NO    Consultant(s) Notes Reviewed:  [x] YES  [ ] NO  Care Discussed with Consultants/Other Providers [x] YES  [ ] NO ***************************************************************  COVERING RESIDENT: Ivette Lei, PGY 1  Internal Medicine   Pager: RAUL [phone] | [facility]: [phone]  ***************************************************************    59 y.o male presenting with hematemesis.     Interval history/subjective  Patient admitted for hematemesis. No acute events overnight. Patient seen and examined at bedside. He confirms the story that was stated in the H&P. He states that he had 3 episodes of hematemesis which was darker yesterday morning, and then 3-4 episodes of bright red blood in the emesis. During this time he felt dizzy, nauseous, lightheaded, palpitations, abdominal pain and overall malaise. Patient denies losing consciousness, trauma or injury to his body. He also states that he drinks about 5 shots of gin/ day for the last 20 years. His last drink was yesterday. He has drank for the past 20 years, and tried to quit about 4 years ago, but relapsed. He has never had hematemesis in the past. ROS was positive for darker stools, but he denies night sweats, fevers, weight loss. He is able to tolerate PO. He states that he has no medical history and takes no prescription medications, but he does not follow with a primary care physician. Otherwise currently, the patient feels well, without abdominal pain and no emesis since he's gotten to the hospital. He currently denies headaches, blurry vision, sob, cp, abdominal pain or urinary changes.      MEDICATIONS  (STANDING):  cefTRIAXone   IVPB 1000 milliGRAM(s) IV Intermittent every 24 hours  folic acid 1 milliGRAM(s) Oral daily  influenza   Vaccine 0.5 milliLiter(s) IntraMuscular once  multivitamin 1 Tablet(s) Oral daily  octreotide  Infusion 50 MICROgram(s)/Hr (10 mL/Hr) IV Continuous <Continuous>  pantoprazole Infusion 8 mG/Hr (10 mL/Hr) IV Continuous <Continuous>  phytonadione   Solution 5 milliGRAM(s) Oral daily  thiamine 100 milliGRAM(s) Oral daily      MEDICATIONS  (PRN):  LORazepam   Injectable 2 milliGRAM(s) IV Push every 1 hour PRN Symptom-triggered: each CIWA -Ar score 8 or GREATER    OBJECTIVE  Vital Signs Last 24 Hrs  T(C): 36.8 (13 Nov 2019 12:39), Max: 37.1 (12 Nov 2019 14:35)  T(F): 98.2 (13 Nov 2019 12:39), Max: 98.7 (12 Nov 2019 14:35)  HR: 83 (13 Nov 2019 12:39) (83 - 122)  BP: 136/74 (13 Nov 2019 12:39) (113/65 - 136/74)  BP(mean): 88 (13 Nov 2019 12:39) (88 - 88)  RR: 16 (13 Nov 2019 12:39) (16 - 17)  SpO2: 98% (13 Nov 2019 12:39) (97% - 100%)    Daily Height in cm: 167.64 (12 Nov 2019 22:41)        General: well appearing man, well nourished, sleeping in bed comfortably, NAD  HEENT: NC/AT, sclera icterus, moist mucous membranes, supple neck, no tongue fasciculation   Pulm/chest: breathing comfortably on RA, CTA b/l  Cardiac: RRR, s1 and s2 heard, no m/g/r  Abdomen: distended, umbilical hernia which protrudes with coughing, midline surgical scar well heeled without erythema, non tender to palpitation (deep and light) in all four quadrants. ++hepatomegaly from most inferior rib to pelvis, normoactive BS  Extremities: warm and well perfused, no pedal edema, very subtle tremors  Skin: no rashes, no spider angiomas  Neuro: Answers all questions appropriately AAOx3, no focal neurologic deficits    LABS:                                   8.4    8.43  )-----------( 50       ( 13 Nov 2019 07:05 )             24.0     11-13    138  |  100  |  15  ----------------------------<  65<L>  4.2   |  25  |  0.75    Ca    7.7<L>      13 Nov 2019 01:00  Phos  2.3     11-13  Mg     2.0     11-13    TPro  5.8<L>  /  Alb  2.9<L>  /  TBili  1.4<H>  /  DBili  x   /  AST  119<H>  /  ALT  29  /  AlkPhos  104  11-13      LIVER FUNCTIONS - ( 13 Nov 2019 01:00 )  Alb: 2.9 g/dL / Pro: 5.8 g/dL / ALK PHOS: 104 u/L / ALT: 29 u/L / AST: 119 u/L / GGT: x           PT/INR - ( 12 Nov 2019 17:04 )   PT: 19.5 SEC;   INR: 1.73     PTT - ( 12 Nov 2019 17:04 )  PTT:29.0 SEC      Lactate, Blood: 1.2 mmol/L (11-13-19 @ 01:00)      RADIOLOGY & ADDITIONAL TESTS:  CTA A/P with IV Con  FINDINGS:    LOWER CHEST: The lung bases are clear. The visualized heart is normal in   size.    LIVER: Within normal limits.  BILE DUCTS: Normal caliber.  GALLBLADDER: Cholelithiasis.  SPLEEN: Within normal limits.  PANCREAS: Pancreas is normal. No infiltration of the peripancreatic fat.  ADRENALS: Within normal limits.  KIDNEYS/URETERS: Within normal limits.    BLADDER: The bladder is normal.  REPRODUCTIVE ORGANS: The prostate gland is unremarkable.    BOWEL: The stomach is decompressed. The small bowel is normal in caliber.   The large bowel is normal in caliber. No extravasation of intravenous   contrast there is convincingly shown. Appendix is not visualized.  PERITONEUM: No free air or free fluid.  VESSELS: The aorta is normal in caliber. The major branches of the aorta   are proximally patent. Atheromatous disease of the aorta. The portal vein   and hepatic veins are patent.  RETROPERITONEUM/LYMPH NODES: No lymphadenopathy.    ABDOMINAL WALL: Within normal limits.  BONES: Within normal limits.    IMPRESSION:     1.  No bowel obstruction.  2.  Cholelithiasis.  3.  The appendix is not visualized, however, there is no right lower   quadrant inflammation to suggest appendicitis.  4.  The pancreas is normal.        Abdominal exam  FINDINGS:    Liver: Hepatic steatosis.    Bile ducts: Normal caliber. Common bile duct measures 5 mm.     Gallbladder: Intraluminal echogenic shadowing foci compatible with   cholelithiasis. The gallbladder is nondistended. The no gallbladder wall   thickening. The gallbladder wall measures 2 mm. No positive sonographic   Morrow sign.        Pancreas: Visualized portions are within normal limits.    Right kidney: 13 point cm. No hydronephrosis.     Ascites: Trace perihepatic ascites.    IVC: Visualized portions are within normal limits.    IMPRESSION:     1.  No convincing sonographic findings to suggest acute cholecystitis.  2.  Cholelithiasis    Imaging Personally Reviewed:  [ ] YES  [ ] NO    Consultant(s) Notes Reviewed:  [x] YES  [ ] NO  Care Discussed with Consultants/Other Providers [x] YES  [ ] NO ***************************************************************  COVERING RESIDENT: Ivette Lei, PGY 1  Internal Medicine   Pager: RAUL [phone] | [facility]: [phone]  ***************************************************************    59 y.o male presenting with hematemesis.     Interval history/subjective  Patient admitted for hematemesis. No acute events overnight. Patient seen and examined at bedside. He confirms the story that was stated in the H&P. He states that he had 3 episodes of hematemesis which was darker yesterday morning, and then 3-4 episodes of bright red blood in the emesis. During this time he felt dizzy, nauseous, lightheaded, palpitations, abdominal pain and overall malaise. Patient denies losing consciousness, trauma or injury to his body. He also states that he drinks about 5 shots of gin/ day for the last 20 years. His last drink was yesterday. He has drank for the past 20 years, and tried to quit about 4 years ago, but relapsed. He has never had hematemesis in the past. ROS was positive for darker stools, but he denies night sweats, fevers, weight loss. He is able to tolerate PO. He states that he has no medical history and takes no prescription medications, but he does not follow with a primary care physician. Otherwise currently, the patient feels well, without abdominal pain and no emesis since he's gotten to the hospital. He currently denies headaches, blurry vision, sob, cp, abdominal pain or urinary changes.      MEDICATIONS  (STANDING):  cefTRIAXone   IVPB 1000 milliGRAM(s) IV Intermittent every 24 hours  folic acid 1 milliGRAM(s) Oral daily  influenza   Vaccine 0.5 milliLiter(s) IntraMuscular once  multivitamin 1 Tablet(s) Oral daily  octreotide  Infusion 50 MICROgram(s)/Hr (10 mL/Hr) IV Continuous <Continuous>  pantoprazole Infusion 8 mG/Hr (10 mL/Hr) IV Continuous <Continuous>  phytonadione   Solution 5 milliGRAM(s) Oral daily  thiamine 100 milliGRAM(s) Oral daily      MEDICATIONS  (PRN):  LORazepam   Injectable 2 milliGRAM(s) IV Push every 1 hour PRN Symptom-triggered: each CIWA -Ar score 8 or GREATER    OBJECTIVE  Vital Signs Last 24 Hrs  T(C): 36.8 (13 Nov 2019 12:39), Max: 37.1 (12 Nov 2019 14:35)  T(F): 98.2 (13 Nov 2019 12:39), Max: 98.7 (12 Nov 2019 14:35)  HR: 83 (13 Nov 2019 12:39) (83 - 122)  BP: 136/74 (13 Nov 2019 12:39) (113/65 - 136/74)  BP(mean): 88 (13 Nov 2019 12:39) (88 - 88)  RR: 16 (13 Nov 2019 12:39) (16 - 17)  SpO2: 98% (13 Nov 2019 12:39) (97% - 100%)    Daily Height in cm: 167.64 (12 Nov 2019 22:41)        General: well appearing man, well nourished, sleeping in bed comfortably, NAD  HEENT: NC/AT, sclera icterus, moist mucous membranes, supple neck, no tongue fasciculation   Pulm/chest: breathing comfortably on RA, CTA b/l  Cardiac: RRR, s1 and s2 heard, no m/g/r  Abdomen: distended, umbilical hernia which protrudes with coughing, midline surgical scar well heeled without erythema, non tender to palpitation (deep and light) in all four quadrants. ++hepatomegaly from most inferior rib to pelvis, normoactive BS  Extremities: warm and well perfused, no pedal edema, very subtle tremors  Skin: no rashes, no spider angiomas  Neuro: Answers all questions appropriately AAOx3, no focal neurologic deficits    LABS:                                   8.4    8.43  )-----------( 50       ( 13 Nov 2019 07:05 )             24.0     11-13    138  |  100  |  15  ----------------------------<  65<L>  4.2   |  25  |  0.75    Ca    7.7<L>      13 Nov 2019 01:00  Phos  2.3     11-13  Mg     2.0     11-13    TPro  5.8<L>  /  Alb  2.9<L>  /  TBili  1.4<H>  /  DBili  x   /  AST  119<H>  /  ALT  29  /  AlkPhos  104  11-13      LIVER FUNCTIONS - ( 13 Nov 2019 01:00 )  Alb: 2.9 g/dL / Pro: 5.8 g/dL / ALK PHOS: 104 u/L / ALT: 29 u/L / AST: 119 u/L / GGT: x           PT/INR - ( 12 Nov 2019 17:04 )   PT: 19.5 SEC;   INR: 1.73     PTT - ( 12 Nov 2019 17:04 )  PTT:29.0 SEC      Lactate, Blood: 1.2 mmol/L (11-13-19 @ 01:00)      RADIOLOGY & ADDITIONAL TESTS:  CTA A/P with IV Con  FINDINGS:    LOWER CHEST: The lung bases are clear. The visualized heart is normal in   size.    LIVER: Within normal limits.  BILE DUCTS: Normal caliber.  GALLBLADDER: Cholelithiasis.  SPLEEN: Within normal limits.  PANCREAS: Pancreas is normal. No infiltration of the peripancreatic fat.  ADRENALS: Within normal limits.  KIDNEYS/URETERS: Within normal limits.    BLADDER: The bladder is normal.  REPRODUCTIVE ORGANS: The prostate gland is unremarkable.    BOWEL: The stomach is decompressed. The small bowel is normal in caliber.   The large bowel is normal in caliber. No extravasation of intravenous   contrast there is convincingly shown. Appendix is not visualized.  PERITONEUM: No free air or free fluid.  VESSELS: The aorta is normal in caliber. The major branches of the aorta   are proximally patent. Atheromatous disease of the aorta. The portal vein   and hepatic veins are patent.  RETROPERITONEUM/LYMPH NODES: No lymphadenopathy.    ABDOMINAL WALL: Within normal limits.  BONES: Within normal limits.    IMPRESSION:     1.  No bowel obstruction.  2.  Cholelithiasis.  3.  The appendix is not visualized, however, there is no right lower   quadrant inflammation to suggest appendicitis.  4.  The pancreas is normal.        Abdominal exam  FINDINGS:    Liver: Hepatic steatosis.    Bile ducts: Normal caliber. Common bile duct measures 5 mm.     Gallbladder: Intraluminal echogenic shadowing foci compatible with   cholelithiasis. The gallbladder is nondistended. The no gallbladder wall   thickening. The gallbladder wall measures 2 mm. No positive sonographic   Morrow sign.        Pancreas: Visualized portions are within normal limits.    Right kidney: 13 point cm. No hydronephrosis.     Ascites: Trace perihepatic ascites.    IVC: Visualized portions are within normal limits.    IMPRESSION:     1.  No convincing sonographic findings to suggest acute cholecystitis.  2.  Cholelithiasis      Endoscopy    Impression:          - Non-bleeding large (> 5 mm) esophageal varices found                        in lower and mid esophagus. No red lydia sign seen. Not                        likely the source of patient bleeding.                       - Localized area of erythematous and edematous mucosa in                        the lesser curvature with ulceration. Biopsied.                       - Portal hypertensive gastropathy.                       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  Recommendation:      - Return patient to [facility] for ongoing care.                       - Clear liquid diet today and advanced as tolerated.                       - Stop octreotide infusion as bleeding not from varices.                       - Start non selective B-Blocker for large varices as                        primary prophylaxis.                       - Given Vitamin K for coagulopathy and minimal bleeding                        after bipsies.                       - Continue pantoprazole 40 mg IV BID.                       - Consider EUS for further evaluation of duodenal mass.                       - Await pathology results.            Imaging Personally Reviewed:  [ ] YES  [ ] NO    Consultant(s) Notes Reviewed:  [x] YES  [ ] NO  Care Discussed with Consultants/Other Providers [x] YES  [ ] NO

## 2019-11-13 NOTE — PROGRESS NOTE ADULT - PROBLEM SELECTOR PLAN 4
- large liver span on exam, abdominal ultrasound and CTA A/P demonstrate hepatic steatosis  - elevated INR, decreased albumin  - f/u doppler for portal vein thrombosis  - pt has ascites, will consult procedure team for diagnostic tap  - c/w ceftriaxone 1g q24 hours for 7 days - AST>2x ALT, likely due to alcohol abuse  - c/w trending, f/u GI recs - pt with 5 shots of etoh per day, last drink was last night  - no history of etoh withdrawal  - monitor on Ativan symptom triggered CIWA for now, no Ativan taper necessary for the moment  - started on thiamine IV x3 days, followed by PO  - started on folic acid  - f/u SBIRT

## 2019-11-13 NOTE — PROGRESS NOTE ADULT - PROBLEM SELECTOR PLAN 7
- platelets =69  - can be 2/2 liver dysfunction Vs possible splenomegaly   - keep> 50 in the setting of GIB. Diet: NPO given hematemesis   DVT: no ppx at this point secondary to GIB  Dispo: to home Diet: NPO given hematemesis   DVT: no ppx at this point secondary to GIB  Dispo: to home, PT

## 2019-11-13 NOTE — PROGRESS NOTE ADULT - PROBLEM SELECTOR PLAN 3
- pt with 5 shots of etoh per day, last drink was last night  - no history of etoh withdrawal  - monitor on Ativan symptom triggered CIWA for now, no Ativan taper necessary for the moment  -started on thiamine IV x3 days, followed by PO  -started on folic acid. - large liver span on exam, abdominal ultrasound and CTA A/P demonstrate hepatic steatosis  - elevated INR, decreased albumin  - f/u doppler for portal vein thrombosis  - c/w ceftriaxone 1g q24 hours for 7 days for GI PPx given possible variceal bleed - bleeding ulcer found on endoscopy   - change protonix gtt to 40mg IV BID  - encourage alcohol cessation

## 2019-11-13 NOTE — MEDICAL STUDENT PROGRESS NOTE(EDUCATION) - NS MD HP STUD ASPLAN PLAN FT
Incomplete note 1. Anemia, likely secondary to acute blood loss, 2/2 hematemesis, likely 2/2 variceal bleed vs. booerhave syndrome vs. Danielle-Carpio tear vs. PUD   - S/p 1u pRBCs appropriate response Hb 7.3 --> 8.4   - Currently NPO   - C/w Protonix ggt 8 mg/hr (80mg in 100ml NS, 10 mL/hr).   - c/w octreotide ggt 50 mcg/hr   - 2 large bore IV   - c/w IV fluids   - Monitor CBC   - GI consult    2. GI bleed, hematemesis like 2/2 variceal bleed vs. boerhaave syndrome vs. Danielle-Carpio tear vs. PUD   - Management as above   - GI consult   - c/w ceftriaxone 1,000mg in D5W 50mL, IV intermittent over 24 hours, x 5 days for SBP prophylaxis    3. Coagulopathy - PT 19.5, INR 1.73, likely 2/2 liver dysfunction/cirrhosis in the setting of alcohol abuse   - Not on any blood thinners   - Continue to monitor    4. Thrombocytopenia - platelet count 67 --> 53 --> 50   - Likely due to liver dysfunction vs. possible splenomegaly   - Continue to monitor; keep platelets >50 in setting of GI bleed    5. Transaminitis - AST >2x ALT, consistent with alcohol-induced hepatitis vs. cirrhosis   - Maddrey's Discriminant Function 31.1, consistent with good prognosis, ?no benefit of glucocorticoid therapy   - F/u GI recs re: steroids    6. Abdominal distention - pt with increasing abdominal girth, abd distention on exam in the setting of EtOH abuse, cirrhosis vs. hepatomegaly vs. ascites   - Pt with high suspicion for cirrhosis; MELD-Na score 15 (consistent with <2% 90-day mortality)   - Abd ultrasound shows hepatic steatosis, trace perihepatic ascites   - F/u doppler to evaluate for portal vein thrombosis   - Consider diagnostic paracentesis for ascites; low concern for SBP as pt is afebrile, hemodynamically stable; c/w SBP ppx w/ ceftriaxone as above    7. Alcohol abuse - pt drinks 5 shots of EtOH per day x 20 years, last drink 2 nighs ago.   - No history of EtOH withdrawal   - Monitor CIWA   - C/w IV thiamine x3 days then transition to PO   - C/w folic acid   - Symptom-triggered Ativan prn    8. Prophylactic measure   - No chemoprophylaxis for DVT in setting of GIB   - SCDs 1. Anemia, likely secondary to acute blood loss, 2/2 hematemesis, likely 2/2 variceal bleed vs. booerhave syndrome vs. Danielle-Caripo tear vs. PUD   - S/p 1u pRBCs appropriate response Hb 7.3 --> 8.4   - Currently NPO   - C/w Protonix ggt 8 mg/hr (80mg in 100ml NS, 10 mL/hr)   - c/w octreotide ggt 50 mcg/hr   - 2 large bore IV   - c/w IV fluids   - Monitor CBC q6h (?active GI bleed - melena?)   - GI consult    2. GI bleed, hematemesis like 2/2 variceal bleed vs. boerhaave syndrome vs. Danielle-Carpio tear vs. PUD   - Management as above   - GI consult   - c/w ceftriaxone 1,000mg in D5W 50mL, IV intermittent over 24 hours, x 5 days for SBP prophylaxis - pt has new trace perihepatic ascites, but is afebrile, abd exam shows hepatomegaly but no tenderness, rebound, guarding, peritoneal signs, no leukocytosis.    3. Coagulopathy - PT 19.5, INR 1.73, likely 2/2 liver dysfunction/cirrhosis in the setting of alcohol abuse   - Not on any blood thinners   - Continue to monitor    4. Thrombocytopenia - platelet count 67 --> 53 --> 50   - Likely due to liver dysfunction vs. possible splenomegaly   - Continue to monitor; keep platelets >50 in setting of GI bleed    5. Transaminitis - AST >2x ALT, consistent with alcohol-induced hepatitis vs. cirrhosis   - Maddrey's Discriminant Function 31.1, consistent with good prognosis, ?no benefit of glucocorticoid therapy   - F/u GI recs re: steroids    6. Abdominal distention - pt with increasing abdominal girth, abd distention on exam in the setting of EtOH abuse, cirrhosis vs. hepatomegaly vs. ascites   - Pt with high suspicion for cirrhosis; MELD-Na score 15 (consistent with <2% 90-day mortality)   - Abd ultrasound shows hepatic steatosis, trace perihepatic ascites   - F/u doppler to evaluate for portal vein thrombosis   - Consider diagnostic paracentesis for ascites; low concern for SBP as pt is afebrile, hemodynamically stable; c/w SBP ppx w/ ceftriaxone as above    7. Alcohol abuse - pt drinks 5 shots of EtOH per day x 20 years, last drink 2 nights ago.   - No history of EtOH withdrawal   - Monitor CIWA. +Slight tremor in left hand when arms outstretched   - C/w IV thiamine x3 days then transition to PO   - C/w folic acid   - Symptom-triggered Ativan prn   - F/u SBIRT    8. Smoking cessation   - F/u SBIRT   - Pt not interested in nicotine patch    8. Prophylactic measure   - No chemoprophylaxis for DVT in setting of GIB   - SCDs

## 2019-11-13 NOTE — CONSULT NOTE ADULT - SUBJECTIVE AND OBJECTIVE BOX
GI HPI:  Patient is a 59y old  Male who presents with a chief complaint of hematemesis.      60 yo man with history of alcohol abuse (~5 drinks daily of gin for about 20 years ) who presents with hematemesis reported three times on mon night associated with black coloured stools. Pt denies similar history in the past, prior EGD , prior h/o liver disease. Pt denies abd pain, nausea, fever, chills, diarrhea, OTC NSAID use. Last etoh consumption was on mon night.     In the ED: Tmax:98.7F , /63, , resp=12, sat =100% on RA.   Labs: hgb = 8.5, lactate 4.2-->2.4 s/p 2L IVF.   He was made NPO and started on a PPI drip and octreotide, as well as ceftriaxone (12 Nov 2019 23:54)    Later Hb trended down from 8.5 to 7.3 received 1 unit prbc hb improved to 8.4        PAST MEDICAL & SURGICAL HISTORY  Alcohol abuse  S/P appendectomy  H/O exploratory laparotomy      FAMILY HISTORY:  FAMILY HISTORY:  Family history of bone cancer  NO family h/o liver disease       SOCIAL HISTORY:  smoker: + smoker   Alcohol: + alcohol abuse as noted above   Drug: no IV drug abuse     ALLERGIES:  No Known Allergies      MEDICATIONS:  MEDICATIONS  (STANDING):  cefTRIAXone   IVPB 1000 milliGRAM(s) IV Intermittent every 24 hours  folic acid 1 milliGRAM(s) Oral daily  influenza   Vaccine 0.5 milliLiter(s) IntraMuscular once  multivitamin 1 Tablet(s) Oral daily  octreotide  Infusion 50 MICROgram(s)/Hr (10 mL/Hr) IV Continuous <Continuous>  pantoprazole Infusion 8 mG/Hr (10 mL/Hr) IV Continuous <Continuous>  thiamine 100 milliGRAM(s) Oral daily    MEDICATIONS  (PRN):  LORazepam     Tablet 2 milliGRAM(s) Oral every 2 hours PRN Symptom-triggered 2 point increase in CIWA-Ar  LORazepam   Injectable 2 milliGRAM(s) IV Push every 1 hour PRN Symptom-triggered: each CIWA -Ar score 8 or GREATER      HOME MEDICATIONS:  Home Medications: NONE       ROS:     REVIEW OF SYSTEMS  General:  No fevers  Eyes:  No reported pain   ENT:  No sore throat   NECK: No stiffness   CV:  No chest pain   Resp:  No shortness of breath  GI:  See HPI  :  No dysuria  Muscle:  No weakness  Neuro:  No tingling  Endocrine:  No polyuria  Heme:  No ecchymosis          VITALS:   T(F): 98 (11-13 @ 08:10), Max: 98.7 (11-12 @ 14:35)  HR: 85 (11-13 @ 08:10) (85 - 122)  BP: 128/73 (11-13 @ 08:10) (113/65 - 132/77)  BP(mean): --  RR: 17 (11-13 @ 08:10) (16 - 17)  SpO2: 99% (11-13 @ 08:10) (97% - 100%)    I&O's Summary      PHYSICAL EXAM:  Gen: Comfortable   EYES: No scleral icterus   LUNG: Clear to auscultation bilaterally;  HEART: s1 and s2 heard   ABDOMEN: Soft, +BS, + mild  abd distension , no Abdominal Tenderness, No guarding, No Morrow Sign   Neuro: AAO x3 , no asterix   Ext: no edema     LABS:                        8.4    8.43  )-----------( 50       ( 13 Nov 2019 07:05 )             24.0     PT/INR - ( 12 Nov 2019 17:04 )  INR: 1.73          PTT - ( 12 Nov 2019 17:04 )  PTT:29.0   LIVER FUNCTIONS - ( 13 Nov 2019 01:00 )  Alb: 2.9 g/dL / Pro: 5.8 g/dL / ALK PHOS: 104 u/L / ALT: 29 u/L / AST: 119 u/L / GGT: x           11-13    138  |  100  |  15  ----------------------------<  65<L>  4.2   |  25  |  0.75    Ca    7.7<L>      13 Nov 2019 01:00  Phos  2.3     11-13  Mg     2.0     11-13      Previous EGD: None     Previous colonoscopy:  None       RADIOLOGY:    < from: CT Angio Abdomen and Pelvis w/ IV Cont (11.12.19 @ 19:16) >    LOWER CHEST: The lung bases are clear. The visualized heart is normal in   size.    LIVER: Within normal limits.  BILE DUCTS: Normal caliber.  GALLBLADDER: Cholelithiasis.  SPLEEN: Within normal limits.  PANCREAS: Pancreas is normal. No infiltration of the peripancreatic fat.  ADRENALS: Within normal limits.  KIDNEYS/URETERS: Within normal limits.    BLADDER: The bladder is normal.  REPRODUCTIVE ORGANS: The prostate glandis unremarkable.    BOWEL: The stomach is decompressed. The small bowel is normal in caliber.   The large bowel is normal in caliber. No extravasation of intravenous   contrast there is convincingly shown. Appendix is not visualized.  PERITONEUM: No free air or free fluid.  VESSELS: The aorta is normal in caliber. The major branches of the aorta   are proximally patent. Atheromatous disease of the aorta. The portal vein   and hepatic veins are patent.  RETROPERITONEUM/LYMPH NODES: No lymphadenopathy.    ABDOMINAL WALL: Within normal limits.  BONES: Within normal limits.    IMPRESSION:     1.  No bowel obstruction.  2.  Cholelithiasis.  3.  The appendix is not visualized, however, there is no right lower   quadrant inflammation to suggest appendicitis.  4.  The pancreas is normal.      < end of copied text >    < from: US Abdomen Limited (11.12.19 @ 18:15) >    FINDINGS:    Liver: Hepatic steatosis.    Bile ducts: Normal caliber. Common bile duct measures 5 mm.     Gallbladder: Intraluminal echogenic shadowing foci compatible with   cholelithiasis. The gallbladder is nondistended. The no gallbladder wall   thickening. The gallbladder wall measures 2 mm. No positive sonographic   Morrow sign.        Pancreas: Visualized portions are within normal limits.    Right kidney: 13 point cm. No hydronephrosis.     Ascites: Trace perihepatic ascites.    IVC: Visualized portions are within normal limits.    IMPRESSION:     1.  No convincing sonographic findings to suggest acute cholecystitis.  2.  Cholelithiasis.    < end of copied text >
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
## 2019-11-13 NOTE — PROGRESS NOTE ADULT - PROBLEM SELECTOR PLAN 6
- INR=1.73  - Likely 2/2 cirrhosis in the setting of alcohol abuse   - Not on any blood thinners  - continue to monitor, no signs of bleeding, no dvt ppx at at this point secondary to bleeding - platelets =69  - can be 2/2 liver dysfunction Vs possible splenomegaly   - keep> 50 in the setting of GIB. - patient currently coagulopathic (INR downtrending since admission) with low albumin, increased T. bili and increased AST, ALT (2:1)  - hepatic steatosis on ultrasound, hepatosplenomegaly on exam  - appreciate hepatology recommendations, will obtain and f/u on labs  - Vitamin K 5mg for 3 days  - trend CMP and coag panel daily  - encourage alcohol cessation

## 2019-11-13 NOTE — CONSULT NOTE ADULT - ASSESSMENT
58 yo man with history of alcohol abuse (~5 drinks daily of gin for about 20 years ) who presents with hematemesis reported three times on mon night associated with black coloured stools. Pt denies similar history in the past, prior EGD , prior h/o liver disease. Pt denies abd pain, nausea, fever, chills, diarrhea, OTC NSAID use. Last etoh consumption was on mon night.     #) Hematemesis / Melena - likely resolved   associated with acute blood loss anemia - Hb trended down from 8.5 to 7.3 received 1 unit prbc hb improved to 8.4  associated with thrombocytopenia , Plt -50, Coagulopathy INR - 1.73  Vital Signs - stable     Stable upper GI bleed - DD variceal vs non variceal (PUD, MWT vs others)   Rec   NPO   IV protonix drip  IV octeriotide drip   IV rocephin 1 gm q24   Monitor CBC and transfuse to keep Hb 7-8   Give Vit K 5 mg   Will plan for EGD today     #) Acute on chronic alcohol liver disease with possible underlying liver cirrhosis   INR - 1.73 , T.mehrdad - 1.4 , no encephalopathy    MDF - 36.2   Meld Na - 15   No radiological evidence of cirrhosis - no liver cirrhosis ,  no splenomegaly , no ascites   + Lab evidence - thrombocytopenia , + coagulopathy , AST > ALT   Rec   Strict alcohol abstinence   Monitor LFT and INR daily   R/o other causes of liver disease - Hep A Igm Ig G , Hep B S Ag, Hep B S Ab, Hep B C Ab, Hep C Ab   Check GRAHAM, AMA, ASMA, Ig G , Anti LKM1, SLP, SPEP, UPEP, TTG Ig A  Check Ceruloplasmin,  A1AT levels   Give Vit K 5mg daily for 3 days       - Dr.Abhishek Lopez   GI Fellow   For any questions between 8 am to 5 pm , mon to thu please call [phone].

## 2019-11-13 NOTE — PROGRESS NOTE ADULT - PROBLEM SELECTOR PLAN 5
- AST>2x ALT, c/w alcohol induced hepatitis  - Would consider steroids in this pt, will ask GI - INR=1.73, PLT 50  - Likely 2/2 cirrhosis in the setting of alcohol abuse   - Not on any blood thinners  - continue to monitor, no signs of bleeding, no dvt ppx at at this point secondary to bleeding - large liver span on exam, abdominal ultrasound and CTA A/P demonstrate hepatic steatosis  - elevated INR, decreased albumin  - c/w ceftriaxone 1g q24 hours for 7 days  - hepatic steatosis found on US, hepatology following, appreciate recs  - pending liver labs and continue with vitamin K 5mg for 3 days - large liver span on exam, abdominal ultrasound and CTA A/P demonstrate hepatic steatosis  - elevated INR, decreased albumin  - c/w ceftriaxone 1g q24 hours for 7 days, trace hepatic ascites, not enough for diagnostic paracentesis  - hepatic steatosis found on US, hepatology following, appreciate recs  - pending liver labs and continue with vitamin K 5mg for 3 days

## 2019-11-13 NOTE — PROGRESS NOTE ADULT - PROBLEM SELECTOR PLAN 2
-2/2 hematemesis 2/2 variceal bleed Vs boerhaave syndrome Vs Danielle-Carpio   - as stated as above  - f/u GI recommendations - pt with 5 shots of etoh per day, last drink was last night  - no history of etoh withdrawal  - monitor on Ativan symptom triggered CIWA for now, no Ativan taper necessary for the moment  -started on thiamine IV x3 days, followed by PO  -started on folic acid. - varices as determined by endoscopy, but without bleeding  - will start propranolol  - can d/c octreotide  - start propranolol 20mg PO bid  - encourage alcohol cessation

## 2019-11-13 NOTE — PROGRESS NOTE ADULT - PROBLEM SELECTOR PLAN 1
-2/2 hematemesis 2/2 variceal bleed Vs boerhaave syndrome Vs Danielle-Carpio tear Vs PUD  - unclear baseline hemoglobin, but repeat last night was 7.3 prior to 1 unit pRBCs at 4AM  - will trend post transfusion CBC, monitor for more bleeding  - trend CBC q6 hours until stable  - c/w octreotide and ppi gtt  - will transfuse Hgb < 7, or if patient symptomatic -2/2 hematemesis 2/2 variceal bleed Vs boerhaave syndrome Vs Danielle-Carpio tear Vs PUD  - unclear baseline hemoglobin, but repeat last night was 7.3 prior to 1 unit pRBCs at 4AM  - post transfusion hemoglobin responded appropriately  - trend CBC q6 hours until stable  - c/w octreotide and ppi gtt  - will transfuse Hgb < 7, or if patient symptomatic -2/2 hematemesis (GIB) 2/2 variceal bleed Vs boerhaave syndrome Vs Danielle-Carpio tear Vs PUD  - baseline Hb 2013 btw 8-9, Hb dropped to 7.3 s/p 1 unit pRBCs   - post transfusion hemoglobin responded appropriately  - trend CBC q6 hours until stable  - c/w octreotide and ppi gtt  - will transfuse Hgb < 7, or if patient symptomatic - endoscopy revealed non bleeding varices and bleeding peptic ulcer  - currently s/p 1 unit pRBC with appropriate response  - trend CBC q6 hours until stable  - appreciate GI recommendations, will transfuse if Hgb <6  - can stop octreotide, change to Protonix 40mg IV BID and start Propranolol

## 2019-11-13 NOTE — CHART NOTE - NSCHARTNOTEFT_GEN_A_CORE
Invasive procedure team consulted to evaluate patient for possible diagnostic paracentesis given perihepatic ascites seen on abd US. On bedside US exam today no ascites appreciated for diagnostic tap, hepatomegaly noted with liver tip in RLQ. Please reconsult if needed for worsening abd distension and/or pain from suspected ascites.    Jairo Romero  Invasive Procedure Team  EM/IM PGY2  [id number] Invasive procedure team consulted to evaluate patient for possible diagnostic paracentesis given perihepatic ascites seen on abd US. On bedside US exam today no ascites appreciated for diagnostic tap, hepatomegaly noted with liver tip in RLQ (US images placed in chart). Please reconsult if needed for worsening abd distension and/or pain from suspected ascites.    Jairo Romero  Invasive Procedure Team  EM/IM PGY2  [id number]

## 2019-11-13 NOTE — MEDICAL STUDENT PROGRESS NOTE(EDUCATION) - NS MD HP STUD ASPLAN ASSES FT
Incomplete note Mr. Howard is a 59-year-old man with no past medical history presenting with an acute history of hematemesis x6 episodes, black blood that became bright red blood, associated with abdominal pain, palpitations, lightheadedness; s/p 1u pRBCs @ 4am Hb 7.3 --> 8.4. Admitted for management of GI bleed.

## 2019-11-14 ENCOUNTER — TRANSCRIPTION ENCOUNTER (OUTPATIENT)
Age: 59
End: 2019-11-14

## 2019-11-14 DIAGNOSIS — Z02.9 ENCOUNTER FOR ADMINISTRATIVE EXAMINATIONS, UNSPECIFIED: ICD-10-CM

## 2019-11-14 DIAGNOSIS — K31.89 OTHER DISEASES OF STOMACH AND DUODENUM: ICD-10-CM

## 2019-11-14 DIAGNOSIS — J69.0 PNEUMONITIS DUE TO INHALATION OF FOOD AND VOMIT: ICD-10-CM

## 2019-11-14 LAB
A1AT SERPL-MCNC: 153 MG/DL — SIGNIFICANT CHANGE UP (ref 90–200)
ALBUMIN SERPL ELPH-MCNC: 2.8 G/DL — LOW (ref 3.3–5)
ALP SERPL-CCNC: 90 U/L — SIGNIFICANT CHANGE UP (ref 40–120)
ALT FLD-CCNC: 26 U/L — SIGNIFICANT CHANGE UP (ref 4–41)
ANION GAP SERPL CALC-SCNC: 9 MMO/L — SIGNIFICANT CHANGE UP (ref 7–14)
APTT BLD: 27.4 SEC — LOW (ref 27.5–36.3)
AST SERPL-CCNC: 90 U/L — HIGH (ref 4–40)
BASOPHILS # BLD AUTO: 0.03 K/UL — SIGNIFICANT CHANGE UP (ref 0–0.2)
BASOPHILS NFR BLD AUTO: 0.2 % — SIGNIFICANT CHANGE UP (ref 0–2)
BILIRUB SERPL-MCNC: 1.4 MG/DL — HIGH (ref 0.2–1.2)
BUN SERPL-MCNC: 9 MG/DL — SIGNIFICANT CHANGE UP (ref 7–23)
CALCIUM SERPL-MCNC: 7.9 MG/DL — LOW (ref 8.4–10.5)
CERULOPLASMIN SERPL-MCNC: 30 MG/DL — SIGNIFICANT CHANGE UP (ref 15–30)
CHLORIDE SERPL-SCNC: 99 MMOL/L — SIGNIFICANT CHANGE UP (ref 98–107)
CO2 SERPL-SCNC: 26 MMOL/L — SIGNIFICANT CHANGE UP (ref 22–31)
CREAT SERPL-MCNC: 0.76 MG/DL — SIGNIFICANT CHANGE UP (ref 0.5–1.3)
EOSINOPHIL # BLD AUTO: 0.07 K/UL — SIGNIFICANT CHANGE UP (ref 0–0.5)
EOSINOPHIL NFR BLD AUTO: 0.5 % — SIGNIFICANT CHANGE UP (ref 0–6)
GLUCOSE SERPL-MCNC: 104 MG/DL — HIGH (ref 70–99)
HAV IGG SER QL IA: NONREACTIVE — SIGNIFICANT CHANGE UP
HAV IGM SER-ACNC: NONREACTIVE — SIGNIFICANT CHANGE UP
HBV CORE AB SER-ACNC: NONREACTIVE — SIGNIFICANT CHANGE UP
HBV CORE IGM SER-ACNC: NONREACTIVE — SIGNIFICANT CHANGE UP
HBV SURFACE AB SER-ACNC: NONREACTIVE — SIGNIFICANT CHANGE UP
HBV SURFACE AG SER-ACNC: NEGATIVE — SIGNIFICANT CHANGE UP
HCT VFR BLD CALC: 25.4 % — LOW (ref 39–50)
HGB BLD-MCNC: 8.6 G/DL — LOW (ref 13–17)
IMM GRANULOCYTES NFR BLD AUTO: 0.5 % — SIGNIFICANT CHANGE UP (ref 0–1.5)
INR BLD: 1.51 — HIGH (ref 0.88–1.17)
LYMPHOCYTES # BLD AUTO: 19.5 % — SIGNIFICANT CHANGE UP (ref 13–44)
LYMPHOCYTES # BLD AUTO: 2.69 K/UL — SIGNIFICANT CHANGE UP (ref 1–3.3)
MAGNESIUM SERPL-MCNC: 1.8 MG/DL — SIGNIFICANT CHANGE UP (ref 1.6–2.6)
MCHC RBC-ENTMCNC: 33.7 PG — SIGNIFICANT CHANGE UP (ref 27–34)
MCHC RBC-ENTMCNC: 33.9 % — SIGNIFICANT CHANGE UP (ref 32–36)
MCV RBC AUTO: 99.6 FL — SIGNIFICANT CHANGE UP (ref 80–100)
MONOCYTES # BLD AUTO: 0.78 K/UL — SIGNIFICANT CHANGE UP (ref 0–0.9)
MONOCYTES NFR BLD AUTO: 5.7 % — SIGNIFICANT CHANGE UP (ref 2–14)
NEUTROPHILS # BLD AUTO: 10.16 K/UL — HIGH (ref 1.8–7.4)
NEUTROPHILS NFR BLD AUTO: 73.6 % — SIGNIFICANT CHANGE UP (ref 43–77)
NRBC # FLD: 0.03 K/UL — SIGNIFICANT CHANGE UP (ref 0–0)
PHOSPHATE SERPL-MCNC: 1.9 MG/DL — LOW (ref 2.5–4.5)
PLATELET # BLD AUTO: 53 K/UL — LOW (ref 150–400)
PMV BLD: 11.4 FL — SIGNIFICANT CHANGE UP (ref 7–13)
POTASSIUM SERPL-MCNC: 3.7 MMOL/L — SIGNIFICANT CHANGE UP (ref 3.5–5.3)
POTASSIUM SERPL-SCNC: 3.7 MMOL/L — SIGNIFICANT CHANGE UP (ref 3.5–5.3)
PROT SERPL-MCNC: 6 G/DL — SIGNIFICANT CHANGE UP (ref 6–8.3)
PROT SERPL-MCNC: 6.1 G/DL — SIGNIFICANT CHANGE UP (ref 6–8.3)
PROT UR-MCNC: 8.4 MG/DL — SIGNIFICANT CHANGE UP
PROT UR-MCNC: 8.5 MG/DL — SIGNIFICANT CHANGE UP
PROTHROM AB SERPL-ACNC: 17.4 SEC — HIGH (ref 9.8–13.1)
RBC # BLD: 2.55 M/UL — LOW (ref 4.2–5.8)
RBC # FLD: 16.2 % — HIGH (ref 10.3–14.5)
SODIUM SERPL-SCNC: 134 MMOL/L — LOW (ref 135–145)
SPECIMEN SOURCE: SIGNIFICANT CHANGE UP
SPECIMEN SOURCE: SIGNIFICANT CHANGE UP
WBC # BLD: 13.8 K/UL — HIGH (ref 3.8–10.5)
WBC # FLD AUTO: 13.8 K/UL — HIGH (ref 3.8–10.5)

## 2019-11-14 PROCEDURE — 99233 SBSQ HOSP IP/OBS HIGH 50: CPT | Mod: GC

## 2019-11-14 PROCEDURE — 84165 PROTEIN E-PHORESIS SERUM: CPT | Mod: 26

## 2019-11-14 PROCEDURE — 84166 PROTEIN E-PHORESIS/URINE/CSF: CPT | Mod: 26

## 2019-11-14 PROCEDURE — 99232 SBSQ HOSP IP/OBS MODERATE 35: CPT | Mod: GC

## 2019-11-14 RX ORDER — POTASSIUM CHLORIDE 20 MEQ
40 PACKET (EA) ORAL ONCE
Refills: 0 | Status: COMPLETED | OUTPATIENT
Start: 2019-11-14 | End: 2019-11-14

## 2019-11-14 RX ORDER — METRONIDAZOLE 500 MG
500 TABLET ORAL ONCE
Refills: 0 | Status: COMPLETED | OUTPATIENT
Start: 2019-11-14 | End: 2019-11-14

## 2019-11-14 RX ORDER — METRONIDAZOLE 500 MG
500 TABLET ORAL EVERY 8 HOURS
Refills: 0 | Status: DISCONTINUED | OUTPATIENT
Start: 2019-11-14 | End: 2019-11-15

## 2019-11-14 RX ORDER — METRONIDAZOLE 500 MG
TABLET ORAL
Refills: 0 | Status: DISCONTINUED | OUTPATIENT
Start: 2019-11-14 | End: 2019-11-15

## 2019-11-14 RX ADMIN — Medication 100 MILLIGRAM(S): at 09:41

## 2019-11-14 RX ADMIN — Medication 100 MILLIGRAM(S): at 12:59

## 2019-11-14 RX ADMIN — Medication 1 MILLIGRAM(S): at 12:59

## 2019-11-14 RX ADMIN — Medication 5 MILLIGRAM(S): at 12:59

## 2019-11-14 RX ADMIN — PANTOPRAZOLE SODIUM 40 MILLIGRAM(S): 20 TABLET, DELAYED RELEASE ORAL at 17:04

## 2019-11-14 RX ADMIN — Medication 1 TABLET(S): at 12:59

## 2019-11-14 RX ADMIN — Medication 20 MILLIGRAM(S): at 17:04

## 2019-11-14 RX ADMIN — Medication 40 MILLIEQUIVALENT(S): at 17:04

## 2019-11-14 RX ADMIN — PANTOPRAZOLE SODIUM 40 MILLIGRAM(S): 20 TABLET, DELAYED RELEASE ORAL at 06:01

## 2019-11-14 RX ADMIN — Medication 100 MILLIGRAM(S): at 17:04

## 2019-11-14 RX ADMIN — CEFTRIAXONE 100 MILLIGRAM(S): 500 INJECTION, POWDER, FOR SOLUTION INTRAMUSCULAR; INTRAVENOUS at 19:32

## 2019-11-14 NOTE — PHYSICAL THERAPY INITIAL EVALUATION ADULT - GENERAL OBSERVATIONS, REHAB EVAL
Patient received semi supine in bed, (+) IV , in no apparent distress, RUPERTO Lawrence is at  bedside.

## 2019-11-14 NOTE — PROGRESS NOTE ADULT - ASSESSMENT
58 yo man with history of alcohol abuse (~5 drinks daily of gin for about 20 years ) who presents with hematemesis reported three times on mon night associated with black coloured stools. Pt denies similar history in the past, prior EGD , prior h/o liver disease. Pt denies abd pain, nausea, fever, chills, diarrhea, OTC NSAID use. Last etoh consumption was on mon night.     #) Hematemesis / Melena -  resolved   associated with acute blood loss anemia - Hb trended down from 8.5 to 7.3 received 1 unit prbc hb improved to 8.4 and is stable at 8.6   associated with thrombocytopenia , Plt -53, Coagulopathy INR - 1.73 s/p Vit K INR - 1.53   S/p EGD on 11/13/2019 -No active bleeing, no recent stigmata of bleeding ,  small gastric ulcer s/p biopsy , large EV , portal Hypertensive gastropathy and also submucosal lesion in bulb     Likely upper GI bleed (resolved ) sec to PUD   Hb stable and no overt gi bleed currently   Rec   Advance diet as tolerated   Protonix 40 bid   stop IV octeriotide drip   IV / Po ABX for 5-7 days   Monitor CBC and transfuse to keep Hb 7-8   follow up with biopsy results   needs repeat EGD after 2 months to evaluate for healing of gastric ulcer   EUS to evaluate sub mucosal duodenal lesion     #) Fever   possible aspiration vs others   Rec   C/w IV Abx   check blood cultures     #) Acute (improving)  on chronic alcohol liver disease with possible underlying liver cirrhosis   INR - 1.51 , T.mehrdad - 1.4 , no encephalopathy    MDF - 26.2 improved from 36   Meld Na - 16   No radiological evidence of cirrhosis - no liver cirrhosis ,  no splenomegaly , no ascites   + Esophageal varices, + Portal Hypertensive gastropathy   + Lab evidence - thrombocytopenia , + coagulopathy , AST > ALT   Rec   Strict alcohol abstinence   Monitor LFT and INR daily   R/o other causes of liver disease - Hep A Igm Ig G , Hep B S Ag, Hep B S Ab, Hep B C Ab, Hep C Ab   Check GRAHAM, AMA, ASMA, Ig G , Anti LKM1, SLP, SPEP, UPEP, TTG Ig A  Check Ceruloplasmin,  A1AT levels   Give Vit K 5mg daily for 3 days   Start on Propanolol 20 mg twice daily and monitor HR, BP       - Dr.Abhishek Lopez   GI Fellow   For any questions between 8 am to 5 pm , mon to thu please call [phone]. 60 yo man with history of alcohol abuse (~5 drinks daily of gin for about 20 years ) who presents with hematemesis reported three times on mon night associated with black coloured stools. Pt denies similar history in the past, prior EGD , prior h/o liver disease. Pt denies abd pain, nausea, fever, chills, diarrhea, OTC NSAID use. Last etoh consumption was on mon night.     #) Hematemesis / Melena -  resolved   associated with acute blood loss anemia - Hb trended down from 8.5 to 7.3 received 1 unit prbc hb improved to 8.4 and is stable at 8.6   associated with thrombocytopenia , Plt -53, Coagulopathy INR - 1.73 s/p Vit K INR - 1.53   S/p EGD on 11/13/2019 -No active bleeing, no recent stigmata of bleeding ,  small gastric ulcer s/p biopsy , large EV , portal Hypertensive gastropathy and also submucosal lesion in bulb     Likely upper GI bleed (resolved ) sec to PUD/ ?MWT   Hb stable and no overt gi bleed currently   Rec   Advance diet as tolerated   Protonix 40 bid   stop IV octeriotide drip   IV / Po ABX for 5-7 days   Monitor CBC and transfuse to keep Hb 7-8   follow up with biopsy results   needs repeat EGD after 2 months to evaluate for healing of gastric ulcer   Will discuss with advanced endoscopy service regarding EUS to evaluate sub mucosal duodenal lesion     #) Fever   possible aspiration vs others   Cxr - possible left lobe opacity   Rec   C/w IV Abx   check blood cultures     #) Acute (improving)  on chronic alcohol liver disease with possible underlying liver cirrhosis   INR - 1.51 , T.mehrdad - 1.4 , no encephalopathy    MDF - 26.2 improved from 36   Meld Na - 16   No radiological evidence of cirrhosis - no liver cirrhosis ,  no splenomegaly , no ascites   + Esophageal varices, + Portal Hypertensive gastropathy   + Lab evidence - thrombocytopenia , + coagulopathy , AST > ALT   Rec   Strict alcohol abstinence   Monitor LFT and INR daily   R/o other causes of liver disease - Hep A Igm Ig G , Hep B S Ag, Hep B S Ab, Hep B C Ab, Hep C Ab   Check GRAHAM, AMA, ASMA, Ig G , Anti LKM1, SLP, SPEP, UPEP, TTG Ig A  Check Ceruloplasmin,  A1AT levels   Give Vit K 5mg daily for 3 days   Start on Propanolol 20 mg twice daily and monitor HR, BP       - Dr.Abhishek Lopez   GI Fellow   For any questions between 8 am to 5 pm , mon to thu please call [phone].

## 2019-11-14 NOTE — MEDICAL STUDENT PROGRESS NOTE(EDUCATION) - NS MD HP STUD ASPLAN ASSES FT
**Incomplete note**    59-year-old man with hx EtOH abuse presented with 6 episodes of hematemesis, acute blood loss anemia s/p 1u pRBCs with appropriate response, found to have non-bleeding esophageal varices on EGD, bleeding gastric ulceration.    Fever Tmax 102.6F on 11/13, +rigors, chills. CXR and abd XR pending radiology read. 59-year-old man with hx EtOH abuse presented with 6 episodes of hematemesis, acute blood loss anemia s/p 1u pRBCs with appropriate response, found to have non-bleeding esophageal varices on EGD, bleeding gastric ulceration.    Fever Tmax 102.6F on 11/13, +rigors, chills. Abdominal x-ray shows no free intraperitoneal air. CXR shows  Ill-defined patchy left midlung opacity raising concern for pneumonia.

## 2019-11-14 NOTE — PROGRESS NOTE ADULT - PROBLEM SELECTOR PLAN 1
- r/o aspiration pneumonia, pt presenting after endoscopy with cough, fevers and chills  - CXR showed no pneumothorax, clear  - Abdominal x ray showed no abnormalities  - c/w Ceftriaxone 1g [id number] for now  - UA showed RBCs and moderate blood  - pending blood cultures - r/o aspiration pneumonia, pt presenting after endoscopy with cough, fevers and chills  - new leukocytosis of 13, febrile overnight  - CXR- ill defined patchy opacity in left lung field, poss pneumonia  - Abdominal x ray showed no abnormalities, no perf  - c/w Ceftriaxone 1g [id number] for now and flagyl for anaerobe coverage  - UA showed RBCs and moderate blood  - pending blood cultures - r/o aspiration pneumonia, pt presenting after endoscopy with cough, fevers and chills  - meeting sepsis criteria- new leukocytosis of 13, febrile (102.6) with new ill defined lung opacity on CXR concerning for pneumonia  - c/w Ceftriaxone 1g [id number] for now and flagyl 500mg q8hour for anaerobe coverage (day 2/7 for Flagyl)  - UA showed RBCs and moderate blood  - pending blood cultures

## 2019-11-14 NOTE — DISCHARGE NOTE PROVIDER - PROVIDER TOKENS
FREE:[LAST:[Resident],FIRST:[Clinic],PHONE:[phone]],FAX:[phone]    -],ADDRESS:[[address] [place] [address]  [phone]]]

## 2019-11-14 NOTE — DISCHARGE NOTE PROVIDER - NSDCCPCAREPLAN_GEN_ALL_CORE_FT
PRINCIPAL DISCHARGE DIAGNOSIS  Diagnosis: Upper GI bleed  Assessment and Plan of Treatment: You came to the hospital because you had blood in your vomit. During this hospitalization, we continued to monitor your status and it seemed that you needed a blood transfusion. We checked your blood count after and it responded appropriately to the transfusion. The gastroenterology doctors had come and evaluated you and they completed an endoscopy. The endoscopy showed varices (which are dilated blood vessels, which are most likely from alcohol intake) but none which were bleeding. They did find a bleeding peptic ulcer and a submucosal mass in the duodenum (part of your stomach). Therefore you were given a few medications to help control this. You were given propranolol and pantoprazole. We strongly suggest that you follow with the gastroenterology as outpatient as they will most likely conduct another endoscopy.  We hope that you understand that this bleeding is because you consume alcohol on a regular basis. Any amount of alcohol, whether small or large can cause damage to your liver, esophagus, stomach and other parts of the body. Alcohol can cause liver dysfunction, cancer and alcohol withdrawal. We hope that you can quit alcohol PRINCIPAL DISCHARGE DIAGNOSIS  Diagnosis: Upper GI bleed  Assessment and Plan of Treatment: You came to the hospital because you had blood in your vomit. During this hospitalization, we continued to monitor your status and it seemed that you needed a blood transfusion. We checked your blood count after and it responded appropriately to the transfusion. The gastroenterology doctors had come and evaluated you and they completed an endoscopy. The endoscopy showed varices (which are dilated blood vessels, which are most likely from alcohol intake) but none which were bleeding. They did find a bleeding peptic ulcer and a submucosal mass in the duodenum (part of your stomach). Therefore you were given a few medications to help control this. You were given propranolol and pantoprazole. We strongly suggest that you follow with the gastroenterology as outpatient as they will most likely conduct another endoscopy.  We hope that you understand that this bleeding is because you consume alcohol on a regular basis. Any amount of alcohol, whether small or large can cause damage to your liver, esophagus, stomach and other parts of the body. Alcohol can cause liver dysfunction, cancer and alcohol withdrawal. We hope that you can quit alcohol usage after you are discharged from the hospital. Should you have any more bleeding, blood in your vomit, dark stools, lightheadedness, blurry vision or headache, please return to the emergency room. Please take all your medications as prescribed.      SECONDARY DISCHARGE DIAGNOSES  Diagnosis: Peptic ulcer disease  Assessment and Plan of Treatment: During the endoscopy, we found that you had a bleeding ulcer. You were transfused 1 unit of blood. This condition was treated with pantoprazole twice a day. Please take your medications as prescribed. If you were to have any bleeding in your vomit or darker stools, please return to the emergency room.    Diagnosis: Esophageal varices  Assessment and Plan of Treatment: You were diagnosed with esophageal varices during endoscopy. They were nonbleeding, and therefore we recommend that you take Propranolol 20mg twice a day to prevent bleeding. Should you have any more bleeding in your vomit, darker stools or headaches/lightheadedness, please return to the emergency room. We recommend that you follow with your gastroenterologist on a routine basis. PRINCIPAL DISCHARGE DIAGNOSIS  Diagnosis: Upper GI bleed  Assessment and Plan of Treatment: You came to the hospital because you had blood in your vomit. During this hospitalization, we continued to monitor your status and it seemed that you needed a blood transfusion. We checked your blood count after and it responded appropriately to the transfusion. The gastroenterology doctors had come and evaluated you and they completed an endoscopy. The endoscopy showed varices (which are dilated blood vessels, which are most likely from alcohol intake) but none which were bleeding. They did find a bleeding peptic ulcer and a submucosal mass in the duodenum (part of your stomach). Therefore you were given a few medications to help control this. You were given propranolol and pantoprazole. We strongly suggest that you follow with the gastroenterology as outpatient as they will most likely conduct another endoscopy.  We hope that you understand that this bleeding is because you consume alcohol on a regular basis. Any amount of alcohol, whether small or large can cause damage to your liver, esophagus, stomach and other parts of the body. Alcohol can cause liver dysfunction, cancer and alcohol withdrawal. We hope that you can quit alcohol usage after you are discharged from the hospital. Should you have any more bleeding, blood in your vomit, dark stools, lightheadedness, blurry vision or headache, please return to the emergency room. Please take all your medications as prescribed.      SECONDARY DISCHARGE DIAGNOSES  Diagnosis: Pneumonia  Assessment and Plan of Treatment: While at the hospital, you had two fevers, productive cough, chills and rigors. We obtained a chest x ray which showed a possibility that you had pneumonia. We think this is most likely secondary to an aspiration event while you were getting your endoscopy. This is a side effect that occurs sometimes during these procedures because the wind pipe and the food pipe are next to each other. Upon any type of manipulation, secretions, mucous or gastric contents can be brought into the wind pipe. Therefore we are treating you with antibiotics. Please continue taking your oral antibiotics for 5 more days. Please take your medications as prescribed. Should you develop fevers, chills, rigors, chest pain, persistent cough or are not tolerating food or fluids, please return to the emergency room.    Diagnosis: Peptic ulcer disease  Assessment and Plan of Treatment: During the endoscopy, we found that you had a bleeding ulcer. You were transfused 1 unit of blood. This condition was treated with pantoprazole twice a day. Please take your medications as prescribed. If you were to have any bleeding in your vomit or darker stools, please return to the emergency room.    Diagnosis: Esophageal varices  Assessment and Plan of Treatment: You were diagnosed with esophageal varices during endoscopy. They were nonbleeding, and therefore we recommend that you take Propranolol 20mg twice a day to prevent bleeding. Should you have any more bleeding in your vomit, darker stools or headaches/ lightheadedness, please return to the emergency room. We recommend that you follow with your gastroenterologist on a routine basis.

## 2019-11-14 NOTE — PROGRESS NOTE ADULT - PROBLEM SELECTOR PLAN 3
- varices as determined by endoscopy, but without bleeding  - will start propranolol 20mg PO BID  - can d/c octreotide  - encourage alcohol cessation

## 2019-11-14 NOTE — DISCHARGE NOTE PROVIDER - NSDCCPTREATMENT_GEN_ALL_CORE_FT
PRINCIPAL PROCEDURE  Procedure: CTA abdomen w/w/o contrast  Findings and Treatment: FINDINGS:  LOWER CHEST: The lung bases are clear. The visualized heart is normal in   size.  LIVER: Within normal limits.  BILE DUCTS: Normal caliber.  GALLBLADDER: Cholelithiasis.  SPLEEN: Within normal limits.  PANCREAS: Pancreas is normal. No infiltration of the peripancreatic fat.  ADRENALS: Within normal limits.  KIDNEYS/URETERS: Within normal limits.  BLADDER: The bladder is normal.  REPRODUCTIVE ORGANS: The prostate gland is unremarkable.  BOWEL: The stomach is decompressed. The small bowel is normal in caliber.   The large bowel is normal in caliber. No extravasation of intravenous   contrast there is convincingly shown. Appendix is not visualized.  PERITONEUM: No free air or free fluid.  VESSELS: The aorta is normal in caliber. The major branches of the aorta   are proximally patent. Atheromatous disease of the aorta. The portal vein   and hepatic veins are patent.  RETROPERITONEUM/LYMPH NODES: No lymphadenopathy.    ABDOMINAL WALL: Within normal limits.  BONES: Within normal limits.  IMPRESSION:   1.  No bowel obstruction.  2.  Cholelithiasis.  3.  The appendix is not visualized, however, there is no right lower   quadrant inflammation to suggest appendicitis.  4.  The pancreas is normal.      SECONDARY PROCEDURE  Procedure: UGI endoscopy  Findings and Treatment: Findings:       Four columns of non-bleeding large (> 5 mm) varices were found in the        esophagus extending from 27 cm to the EGJ at 41 cm from the incisors. No        stigmata of recent bleeding were evident. No red lydia signs or nipple        signs were present.       The Z-line was regular and was found 41 cm from the incisors.       Localized moderately erythematous mucosa without bleeding and a small        ulcer was found on the lesser curvature of the stomach. Biopsies were        taken with a cold forceps for histology.       Mild to moderated portal hypertensive gastropathy was found in the        entire examined stomach.       A large submucosal mass with no bleeding was found in the duodenal bulb.        This was firm when pressed with a biopsy forceps.                                                                                   Impression:          - Non-bleeding large (> 5 mm) esophageal varices found                        in lower and mid esophagus. No red lydia sign seen. Not                        likely the source of patient bleeding.                       - Localized area of erythematous and edematous mucosa in                        the lesser curvature with ulceration. Biopsied.                       - Portal hypertensive gastropathy.                       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  Recommendation:      - Return patient to [facility] for [facility] care.                       - Clear liquid diet today and advanced as tolerated.                       - Stop octreotide infusion as bleeding not from varices.                       - Start non selective B-Blocker for large varices as                        primary prophylaxis.                       - Given Vitamin K for coagulopathy and minimal bleeding                        after bipsies.                       - Continue pantoprazole 40 mg IV BID.                       - Consider EUS for further evaluation of duodenal mass.                       - Await pathology results.    Procedure: Complete abdominal ultrasound  Findings and Treatment: FINDINGS:  Liver: Hepatic steatosis.  Bile ducts: Normal caliber. Common bile duct measures 5 mm.   Gallbladder: Intraluminal echogenic shadowing foci compatible with   cholelithiasis. The gallbladder is nondistended. The no gallbladder wall   thickening. The gallbladder wall measures 2 mm. No positive sonographic   Morrow sign.      Pancreas: Visualized portions are within normal limits.  Right kidney: 13 point cm. No hydronephrosis.   Ascites: Trace perihepatic ascites.  IVC: Visualized portions are within normal limits.  IMPRESSION:   1.  No convincing sonographic findings to suggest acute cholecystitis.  2.  Cholelithiasis.

## 2019-11-14 NOTE — PROGRESS NOTE ADULT - ASSESSMENT
58 YO M with alcohol use disorder, who presents with hematemesis, admitted for GI bleed. Course complicated by possible aspiration pneumonia given new onset high grade fever, chills, and persistent cough after endoscopy. 60 YO M with alcohol use disorder, who presents with hematemesis, admitted for GI bleed. Course complicated by possible sepsis secondary to aspiration pneumonia.

## 2019-11-14 NOTE — MEDICAL STUDENT PROGRESS NOTE(EDUCATION) - NS MD HP STUD ASPLAN PLAN FT
**Incomplete note**    1. Acute blood loss anemia, EGD yesterday showed non-bleeding esophageal varices and bleeding gastric ulcer   - s/p 1u pRBCs with response 7.3 --> 8.4   - Hb 9.1 yesterday PM   - Continue to trend CBC q6 hours until stable   - Appreciate GI recs, will transfuse if Hb <6   - C/w Protonix 40mg IV BID   - C/w Propranolol 20mg PO BID    2. Esophageal varices as determined by endoscopy, without bleeding   - Propranolol 20mg PO BID   - Encourage alcohol cessation, f/u SW recs    3. Gastric ulcer, acute, bleeding ulcer found on endoscopy   - C/w Protonix 40mg IV BID   - Encourage alcohol cessation    4. Fever - patient developed fever, rigors, chills after returning to floor. Concerning for aspiration chemical pneumonitis vs. aspiration pneumonia   - CXR and abdominal XR done, pending read   - UA, Ucx, blood cultures ordered   - Will add metronidazole for now, currently receiving ceftriaxone for SBP prophylaxis    5. Alcohol abuse - 5 shots of gin per day, last drink was Monday night.   - No history of EtOH withdrawal   - Monitor CIWA   - Symptom-triggered Ativan prn. No taper necessary for the moment   - f/u SBIRT    6. Abdominal distention - large liver span on exam, abdominal u/s and CTA A/P demonstrate hepatic steatosis/hepatomegaly   - c/w ceftriaxone 1g q24h for 7 days, trace perihepatic ascites, not enough for diagnostic paracentesis   - Hepatology following, appreciate recs   - Pending liver lab workup. HCV negative    7. Liver dysfunction - coagulopathy (INR downtrending since admission), thrombocytopenia, with low albumin, increased Tbili, AST:ALT > 2:1   - Hepatic steatosis on u/s, hepatosplenomegaly on exam   - Appreciate hepatology recs   - Vitamin K 5mg for 3 days   - Trend CMP and coag panel daily   - Encourage alcohol cessation    8. Prophylactic measure   - currently on clear liquid diet, will advance pending GI recs   - No chemoprophylaxis for DVT given GI bleed, coagulopathy 1. Fever - patient developed fever, rigors, chills after returning to floor. Concerning for aspiration chemical pneumonitis vs. aspiration pneumonia   - CXR shows  Ill-defined patchy left midlung opacity raising concern for pneumonia. - UA, Ucx, blood cultures ordered.   - Abd x-ray shows no free intraperitoneal air   - Will add metronidazole for now for aspiration pneumonia, currently receiving 1g IV ceftriaxone q24 for SBP prophylaxis    2. Acute blood loss anemia, EGD yesterday showed non-bleeding esophageal varices and bleeding gastric ulcer   - s/p 1u pRBCs with response 7.3 --> 8.4   - Hb 9.1 yesterday PM   - Continue to trend CBC q6 hours until stable   - Appreciate GI recs, will transfuse if Hb <6   - C/w Protonix 40mg IV BID   - C/w Propranolol 20mg PO BID    2. Esophageal varices as determined by endoscopy, without bleeding   - Propranolol 20mg PO BID   - Encourage alcohol cessation, f/u SW recs   - F/u GI recs re: repeat endoscopy    3. Gastric ulcer, acute, bleeding ulcer found on endoscopy   - C/w Protonix 40mg IV BID   - Encourage alcohol cessation    4. Fever - patient developed fever, rigors, chills after returning to floor. Concerning for aspiration chemical pneumonitis vs. aspiration pneumonia   - CXR and abdominal XR done, pending read   - UA, Ucx, blood cultures ordered   - Will add metronidazole for now, currently receiving ceftriaxone for SBP prophylaxis    5. Alcohol abuse - 5 shots of gin per day, last drink was Monday night.   - No history of EtOH withdrawal   - Monitor CIWA   - Symptom-triggered Ativan prn. No taper necessary for the moment   - f/u SBIRT    6. Abdominal distention - large liver span on exam, abdominal u/s and CTA A/P demonstrate hepatic steatosis/hepatomegaly   - c/w ceftriaxone 1g q24h for 7 days, trace perihepatic ascites, not enough for diagnostic paracentesis   - Hepatology following, appreciate recs   - Pending liver lab workup. HCV negative    7. Liver dysfunction - coagulopathy (INR downtrending since admission), thrombocytopenia, with low albumin, increased Tbili, AST:ALT > 2:1   - Hepatic steatosis on u/s, hepatosplenomegaly on exam   - Appreciate hepatology recs   - Vitamin K 5mg for 3 days   - Trend CMP and coag panel daily   - Encourage alcohol cessation    8. Prophylactic measure   - currently on clear liquid diet, will advance pending GI recs   - No chemoprophylaxis for DVT given GI bleed, coagulopathy 1. Aspiration pneumonia - patient developed fever, rigors, chills after returning to floor. Meets sepsis criteria - WBC 13, T102.6F, new left lung opacity on CXR concerning for pneumonia   - CXR shows  Ill-defined patchy left midlung opacity raising concern for pneumonia. - UA, Ucx, blood cultures ordered.   - Will add metronidazole 500mg q8h for now for aspiration pneumonia, currently receiving 1g IV ceftriaxone q24 for SBP prophylaxis   - UA showed RBCs and moderate blood    2. Acute blood loss anemia, EGD yesterday showed non-bleeding esophageal varices and bleeding gastric ulcer   - s/p 1u pRBCs with response 7.3 --> 8.4   - Hb 9.1 yesterday PM   - Continue to trend CBC q24h unless signs of bleeding   - Appreciate GI recs, will transfuse if Hb <8 with emergent EGD and restart octreotide at that point   - C/w Protonix 40mg IV BID   - C/w Propranolol 20mg PO BID    3. Esophageal varices as determined by endoscopy, without bleeding   - Propranolol 20mg PO BID   - Encourage alcohol cessation, f/u SW recs   - F/u GI recs re: repeat endoscopy    4. Gastric ulcer, acute, bleeding ulcer found on endoscopy   - C/w Protonix 40mg IV BID   - Encourage alcohol cessation    5. Duodenal mass - duodenal bulb submucosal mass found on EGD   - Appreciate GI recs re: EUS, inpatient vs. outpt   - Advancing diet as per GI    6. Alcohol abuse - 5 shots of gin per day, last drink was Monday night.   - No history of EtOH withdrawal   - Monitor CIWA   - Symptom-triggered Ativan prn. No taper necessary for the moment   - f/u SBIRT    6. Abdominal distention - large liver span on exam, abdominal u/s and CTA A/P demonstrate hepatic steatosis/hepatomegaly   - c/w ceftriaxone 1g q24h for 7 days, trace perihepatic ascites, not enough for diagnostic paracentesis   - Hepatology following, appreciate recs   - Pending liver lab workup. HCV negative   - Acute (resolving) on chronic alcoholic liver disease, no evidence of cirrhosis    7. Liver dysfunction - coagulopathy (INR downtrending since admission), thrombocytopenia, with low albumin, increased Tbili, AST:ALT > 2:1   - Hepatic steatosis on u/s, hepatosplenomegaly on exam   - Appreciate hepatology recs   - Vitamin K 5mg for 3 days   - Trend CMP and coag panel daily   - Encourage alcohol cessation    8. Prophylactic measure   - currently on clear liquid diet, will advance pending GI recs   - No chemoprophylaxis for DVT given GI bleed, coagulopathy

## 2019-11-14 NOTE — PROGRESS NOTE ADULT - PROBLEM SELECTOR PLAN 5
- pt with 5 shots of etoh per day, last drink was last night  - no history of etoh withdrawal  - monitor on Ativan symptom triggered CIWA for now, no Ativan taper necessary for the moment, CIWA has been consistently 1  - started on thiamine IV x3 days, followed by PO  - started on folic acid  - f/u SBIRT - duodenal bulb submucosal mass found on endoscopy  - appreciate GI recommendations  - will follow up if EUS warranted in patient or outpatient  - as per GI, can advance diet

## 2019-11-14 NOTE — DISCHARGE NOTE PROVIDER - NSDCMRMEDTOKEN_GEN_ALL_CORE_FT
amoxicillin-clavulanate 875 mg-125 mg oral tablet: 1 tab(s) orally 2 times a day, end date 11/19  folic acid 1 mg oral tablet: 1 tab(s) orally once a day  Multiple Vitamins oral tablet: 1 tab(s) orally once a day  pantoprazole 40 mg oral delayed release tablet: 1 tab(s) orally once a day, please take in the morning, 30 minutes before breakfast  propranolol 20 mg oral tablet: 1 tab(s) orally 2 times a day   thiamine 100 mg oral tablet: 1 tab(s) orally once a day

## 2019-11-14 NOTE — MEDICAL STUDENT PROGRESS NOTE(EDUCATION) - SUBJECTIVE AND OBJECTIVE BOX
Patient is a 59y old  Male who presents with a chief complaint of hematemesis (2019 06:38)    **Incomplete note**    SUBJECTIVE / OVERNIGHT EVENTS: Pt developed a fever yesterday after endoscopy, Tmax 102.6F @ 6:52pm (, /75, RR 17, 97% room air)    He reports he is feeling    Patient denies CP, SOB.    MEDICATIONS  (STANDING):  cefTRIAXone   IVPB 1000 milliGRAM(s) IV Intermittent every 24 hours  folic acid 1 milliGRAM(s) Oral daily  influenza   Vaccine 0.5 milliLiter(s) IntraMuscular once  multivitamin 1 Tablet(s) Oral daily  pantoprazole  Injectable 40 milliGRAM(s) IV Push two times a day  phytonadione   Solution 5 milliGRAM(s) Oral daily  propranolol 20 milliGRAM(s) Oral two times a day  thiamine 100 milliGRAM(s) Oral daily    MEDICATIONS  (PRN):  ibuprofen  Tablet. 400 milliGRAM(s) Oral once PRN Temp greater or equal to 38C (100.4F)  LORazepam     Tablet 2 milliGRAM(s) Oral every 2 hours PRN Symptom-triggered 2 point increase in CIWA-Ar  LORazepam   Injectable 2 milliGRAM(s) IV Push every 1 hour PRN Symptom-triggered: each CIWA -Ar score 8 or GREATER      T(C): 36.8 (19 @ 05:25), Max: 39.2 (19 @ 18:52)  HR: 77 (19 @ 05:25) (77 - 109)  BP: 107/56 (19 @ 05:25) (107/56 - 150/82)  RR: 16 (19 @ 05:25) (14 - 18)  SpO2: 98% (19 @ 05:25) (96% - 100%)    PHYSICAL EXAM  GENERAL: NAD, well-developed  NEURO: AO x3, PERRLA, EOMI, motor strength intact in 4/4 extremities, sensation intact  HEAD:  Atraumatic, Normocephalic  EYES: conjunctiva and sclera clear  NECK: Supple, No JVD, no lymphadenopathy, no thyromegaly  CHEST/LUNG: Clear to auscultation bilaterally; No wheezes, rales or rhonchi  HEART: Regular rate and rhythm; No murmurs, rubs, or gallops  ABDOMEN: Soft, Nontender, Nondistended; Bowel sounds present, no masses.  EXTREMITIES:  2+ Peripheral Pulses, No clubbing, cyanosis, or edema  SKIN: Warm, dry, intact, no rashes or lesions  PSYCH: affect appropriate    LABS:                        9.1    7.85  )-----------( 58       ( 2019 17:12 )             26.3     11-    138  |  100  |  15  ----------------------------<  65<L>  4.2   |  25  |  0.75    Ca    7.7<L>      2019 01:00  Phos  2.3       Mg     2.0         TPro  5.8<L>  /  Alb  2.9<L>  /  TBili  1.4<H>  /  DBili  x   /  AST  119<H>  /  ALT  29  /  AlkPhos  104  11-13    PT/INR - ( 2019 17:04 )   PT: 19.5 SEC;   INR: 1.73          PTT - ( 2019 17:04 )  PTT:29.0 SEC      Urinalysis Basic - ( 2019 19:00 )    Color: YELLOW / Appearance: CLEAR / S.020 / pH: 5.5  Gluc: NEGATIVE / Ketone: SMALL  / Bili: NEGATIVE / Urobili: TRACE   Blood: MODERATE / Protein: NEGATIVE / Nitrite: NEGATIVE   Leuk Esterase: NEGATIVE / RBC: 26-50 / WBC 0-2   Sq Epi: OCC / Non Sq Epi: x / Bacteria: NEGATIVE Patient is a 59y old  Male who presents with a chief complaint of hematemesis (2019 06:38)    SUBJECTIVE / OVERNIGHT EVENTS: Pt developed a fever yesterday after endoscopy, Tmax 102.6F @ 6:52pm (, /75, RR 17, 97% room air). Fever was accompanied by chills and rigors, +productive cough. Pt reports feeling "a lot better" this morning, no longer experiencing any fevers, chills, or rigors. Denies abd pain, nausea, vomiting. He had one bowel movement with dark stool. Normal urination. He otherwise feels well and wants to know when he can go home.    MEDICATIONS  (STANDING):  cefTRIAXone   IVPB 1000 milliGRAM(s) IV Intermittent every 24 hours  folic acid 1 milliGRAM(s) Oral daily  influenza   Vaccine 0.5 milliLiter(s) IntraMuscular once  multivitamin 1 Tablet(s) Oral daily  pantoprazole  Injectable 40 milliGRAM(s) IV Push two times a day  phytonadione   Solution 5 milliGRAM(s) Oral daily  propranolol 20 milliGRAM(s) Oral two times a day  thiamine 100 milliGRAM(s) Oral daily    MEDICATIONS  (PRN):  ibuprofen  Tablet. 400 milliGRAM(s) Oral once PRN Temp greater or equal to 38C (100.4F)  LORazepam     Tablet 2 milliGRAM(s) Oral every 2 hours PRN Symptom-triggered 2 point increase in CIWA-Ar  LORazepam   Injectable 2 milliGRAM(s) IV Push every 1 hour PRN Symptom-triggered: each CIWA -Ar score 8 or GREATER    Vital Signs Last 24 Hrs  T(C): 36.8 (2019 05:25), Max: 39.2 (2019 18:52)  T(F): 98.2 (2019 05:25), Max: 102.6 (2019 18:52)  HR: 77 (2019 05:25) (77 - 109)  BP: 107/56 (2019 05:25) (107/56 - 150/82)  BP(mean): 88 (2019 12:39) (88 - 88)  RR: 16 (2019 05:25) (14 - 18)  SpO2: 98% (2019 05:25) (96% - 100%)    PHYSICAL EXAM  GENERAL: NAD, well-developed  NEURO: AO x3, PERRLA, EOMI,  HEAD:  Atraumatic, Normocephalic  EYES: conjunctiva and sclera clear  NECK: Supple, No JVD, no lymphadenopathy, no thyromegaly  CHEST/LUNG: Right lung fields clear; slight crackles and diminished breath sounds in left middle lung field posteriorly, changed from previous exam. Otherwise no wheezes, rales, or rhonchi.  HEART: Regular rate and rhythm; No murmurs, rubs, or gallops  ABDOMEN: Soft, Nontender, Distended with hepatomegaly, ?splenomegaly; Bowel sounds present, no masses.  EXTREMITIES:  2+ Peripheral Pulses, No clubbing, cyanosis, or edema  SKIN: Warm, dry, intact, no rashes or lesions  PSYCH: affect appropriate    LABS:                         8.6    13.80 )-----------( 53       ( 2019 06:10 )             25.4     11-14    134<L>  |  99  |  9   ----------------------------<  104<H>  3.7   |  26  |  0.76    Ca    7.9<L>      2019 06:10  Phos  1.9     11-14  Mg     1.8     11-14    TPro  6.0  /  Alb  2.8<L>  /  TBili  1.4<H>  /  DBili  x   /  AST  90<H>  /  ALT  26  /  AlkPhos  90  11-14    PT/INR - ( 2019 06:10 )   PT: 17.4 SEC;   INR: 1.51          PTT - ( 2019 06:10 )  PTT:27.4 SEC  Urinalysis Basic - ( 2019 19:00 )    Color: YELLOW / Appearance: CLEAR / S.020 / pH: 5.5  Gluc: NEGATIVE / Ketone: SMALL  / Bili: NEGATIVE / Urobili: TRACE   Blood: MODERATE / Protein: NEGATIVE / Nitrite: NEGATIVE   Leuk Esterase: NEGATIVE / RBC: 26-50 / WBC 0-2   Sq Epi: OCC / Non Sq Epi: x / Bacteria: NEGATIVE    Urinalysis Basic - ( 2019 19:00 )    Color: YELLOW / Appearance: CLEAR / S.020 / pH: 5.5  Gluc: NEGATIVE / Ketone: SMALL  / Bili: NEGATIVE / Urobili: TRACE   Blood: MODERATE / Protein: NEGATIVE / Nitrite: NEGATIVE   Leuk Esterase: NEGATIVE / RBC: 26-50 / WBC 0-2   Sq Epi: OCC / Non Sq Epi: x / Bacteria: NEGATIVE    TECHNIQUE:   AP Portable chest x-ray.    INTERPRETATION:     The heart is not enlarged. The mediastinum and radha are unremarkable.  The right lung is clear.  There is ill-defined patchy left midlung opacity raising concern for   pneumonia. No pleural effusion or pneumothorax is seen.  No acute bony abnormality noted.              IMPRESSION:  Ill-defined patchy left midlung opacity raising concern for   pneumonia. Recommend follow-up chest x-ray.        TECHNIQUE:   Portable upright abdominal x-ray    INTERPRETATION:     Only the lower half of the lungs and most of the abdomen of visualized.   There is no evidence of free intraperitoneal air on this upright   projection. Nonobstructive bowel pattern.        COMPARISON:   chest radiograph      IMPRESSION:  No evidence of free intraperitoneal air.

## 2019-11-14 NOTE — PHYSICAL THERAPY INITIAL EVALUATION ADULT - PERTINENT HX OF CURRENT PROBLEM, REHAB EVAL
This is a 60 yo man with history of alcohol abuse who presents with hematemesis, admitted for GI bleed.

## 2019-11-14 NOTE — PROGRESS NOTE ADULT - SUBJECTIVE AND OBJECTIVE BOX
***************************************************************  COVERING RESIDENT: Ivette Lei, PGY 1  Internal Medicine   Pager: RAUL [phone] | [facility]: [phone]  ***************************************************************    CC: 59 y.o male presenting with hematemesis.     Interval history/subjective  Yesterday the patient returned from endoscopy without any acute complications. Endoscopy showed nonbleeding varices but also a bleeding peptic ulcer. Octreotide was d/c'ed, propranolol was started and PPI gtt was changed to BID. Otherwise, after endoscopy, he developed a fever (initially to 102.6F), with rigors, chills and persistent cough. CXR did not show any pneumothorax or new consolidation. Abdominal x ray also appeared normal.  We obtained blood cultures, UA, UCx, all are pending. Tylenol and cooling blanket were ordered to control temperature. Otherwise no other acute events overnight.    MEDICATIONS  (STANDING):  cefTRIAXone   IVPB 1000 milliGRAM(s) IV Intermittent every 24 hours  folic acid 1 milliGRAM(s) Oral daily  influenza   Vaccine 0.5 milliLiter(s) IntraMuscular once  multivitamin 1 Tablet(s) Oral daily  octreotide  Infusion 50 MICROgram(s)/Hr (10 mL/Hr) IV Continuous <Continuous>  pantoprazole Infusion 8 mG/Hr (10 mL/Hr) IV Continuous <Continuous>  phytonadione   Solution 5 milliGRAM(s) Oral daily  thiamine 100 milliGRAM(s) Oral daily      MEDICATIONS  (PRN):  LORazepam   Injectable 2 milliGRAM(s) IV Push every 1 hour PRN Symptom-triggered: each CIWA -Ar score 8 or GREATER    OBJECTIVE  Vital Signs Last 24 Hrs  T(C): 36.8 (14 Nov 2019 05:25), Max: 39.2 (13 Nov 2019 18:52)  T(F): 98.2 (14 Nov 2019 05:25), Max: 102.6 (13 Nov 2019 18:52)  HR: 77 (14 Nov 2019 05:25) (77 - 109)  BP: 107/56 (14 Nov 2019 05:25) (107/56 - 150/82)  BP(mean): 88 (13 Nov 2019 12:39) (88 - 88)  RR: 16 (14 Nov 2019 05:25) (14 - 18)  SpO2: 98% (14 Nov 2019 05:25) (96% - 100%)    Daily Height in cm: 167.64 (12 Nov 2019 22:41)      General: well appearing man, well nourished, sleeping in bed comfortably, NAD  HEENT: NC/AT, sclera icterus, moist mucous membranes, supple neck, no tongue fasciculation   Pulm/chest: breathing comfortably on RA, CTA b/l  Cardiac: RRR, s1 and s2 heard, no m/g/r  Abdomen: distended, umbilical hernia which protrudes with coughing, midline surgical scar well heeled without erythema, non tender to palpitation (deep and light) in all four quadrants. ++hepatomegaly from most inferior rib to pelvis, normoactive BS  Extremities: warm and well perfused, no pedal edema, very subtle tremors  Skin: no rashes, no spider angiomas  Neuro: Answers all questions appropriately AAOx3, no focal neurologic deficits    LABS:                                   9.1    7.85  )-----------( 58       ( 13 Nov 2019 17:12 )             26.3       11-13    138  |  100  |  15  ----------------------------<  65<L>  4.2   |  25  |  0.75    Ca    7.7<L>      13 Nov 2019 01:00  Phos  2.3     11-13  Mg     2.0     11-13    TPro  5.8<L>  /  Alb  2.9<L>  /  TBili  1.4<H>  /  DBili  x   /  AST  119<H>  /  ALT  29  /  AlkPhos  104  11-13      LIVER FUNCTIONS - ( 13 Nov 2019 01:00 )  Alb: 2.9 g/dL / Pro: 5.8 g/dL / ALK PHOS: 104 u/L / ALT: 29 u/L / AST: 119 u/L / GGT: x           PT/INR - ( 12 Nov 2019 17:04 )   PT: 19.5 SEC;   INR: 1.73     PTT - ( 12 Nov 2019 17:04 )  PTT:29.0 SEC        Endoscopy    Impression:          - Non-bleeding large (> 5 mm) esophageal varices found                        in lower and mid esophagus. No red lydia sign seen. Not                        likely the source of patient bleeding.                       - Localized area of erythematous and edematous mucosa in                        the lesser curvature with ulceration. Biopsied.                       - Portal hypertensive gastropathy.                       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  Recommendation:      - [facility].                       - Clear liquid diet today and advanced as tolerated.                       - Stop octreotide infusion as bleeding not from varices.                       - Start non selective B-Blocker for large varices as                        primary prophylaxis.                       - Given Vitamin K for coagulopathy and minimal bleeding                        after bipsies.                       - Continue pantoprazole 40 mg IV BID.                       - Consider EUS for further evaluation of duodenal mass.                       - Await pathology results.            Imaging Personally Reviewed:  [ ] YES  [ ] NO    Consultant(s) Notes Reviewed:  [x] YES  [ ] NO  Care Discussed with Consultants/Other Providers [x] YES  [ ] NO ***************************************************************  COVERING RESIDENT: Ivette Lei, PGY 1  Internal Medicine   Pager: RAUL [phone] | [facility]: [phone]  ***************************************************************    CC: 59 y.o male presenting with hematemesis.     Interval history/subjective  Yesterday the patient returned from endoscopy without any acute complications. Endoscopy showed nonbleeding varices but also a bleeding peptic ulcer. Octreotide was d/c'ed, propranolol was started and PPI gtt was changed to BID. Otherwise, after endoscopy, he developed a fever (initially to 102.6F), with rigors, chills and persistent cough. CXR did not show any pneumothorax or new consolidation. Abdominal x ray also appeared normal.  We obtained blood cultures, UA, UCx, all are pending. Tylenol and cooling blanket were ordered to control temperature. Otherwise no other acute events overnight. Patient seen and examined at bedside this morning. He states that he feels well, no more fevers, chills or cough. He also denies n/v, headache, blurry vision, chest pain, sob, palpitations, abdominal pain or changes in urinary behavior. He had a normal bowel movement yesterday and this morning without blood or change in color.     MEDICATIONS  (STANDING):  cefTRIAXone   IVPB 1000 milliGRAM(s) IV Intermittent every 24 hours  folic acid 1 milliGRAM(s) Oral daily  influenza   Vaccine 0.5 milliLiter(s) IntraMuscular once  multivitamin 1 Tablet(s) Oral daily  octreotide  Infusion 50 MICROgram(s)/Hr (10 mL/Hr) IV Continuous <Continuous>  pantoprazole Infusion 8 mG/Hr (10 mL/Hr) IV Continuous <Continuous>  phytonadione   Solution 5 milliGRAM(s) Oral daily  thiamine 100 milliGRAM(s) Oral daily      MEDICATIONS  (PRN):  LORazepam   Injectable 2 milliGRAM(s) IV Push every 1 hour PRN Symptom-triggered: each CIWA -Ar score 8 or GREATER    OBJECTIVE  Vital Signs Last 24 Hrs  T(C): 36.8 (14 Nov 2019 05:25), Max: 39.2 (13 Nov 2019 18:52)  T(F): 98.2 (14 Nov 2019 05:25), Max: 102.6 (13 Nov 2019 18:52)  HR: 77 (14 Nov 2019 05:25) (77 - 109)  BP: 107/56 (14 Nov 2019 05:25) (107/56 - 150/82)  BP(mean): 88 (13 Nov 2019 12:39) (88 - 88)  RR: 16 (14 Nov 2019 05:25) (14 - 18)  SpO2: 98% (14 Nov 2019 05:25) (96% - 100%)    Daily Height in cm: 167.64 (12 Nov 2019 22:41)      General: well appearing man, well nourished, sleeping in bed comfortably, NAD  HEENT: NC/AT, sclera icterus, moist mucous membranes, supple neck, no tongue fasciculation   Pulm/chest: breathing comfortably on RA, CTA b/l  Cardiac: RRR, s1 and s2 heard, no m/g/r  Abdomen: distended, umbilical hernia which protrudes with coughing, midline surgical scar well heeled without erythema, non tender to palpitation (deep and light) in all four quadrants. ++hepatomegaly from most inferior rib to pelvis, normoactive BS  Extremities: warm and well perfused, no pedal edema, very subtle tremors  Skin: no rashes, no spider angiomas  Neuro: Answers all questions appropriately AAOx3, no focal neurologic deficits    LABS:                                              8.6    13.80 )-----------( 53       ( 14 Nov 2019 06:10 )             25.4       11-14    134<L>  |  99  |  9   ----------------------------<  104<H>  3.7   |  26  |  0.76    Ca    7.9<L>      14 Nov 2019 06:10  Phos  1.9     11-14  Mg     1.8     11-14    TPro  6.0  /  Alb  2.8<L>  /  TBili  1.4<H>  /  DBili  x   /  AST  90<H>  /  ALT  26  /  AlkPhos  90  11-14      LIVER FUNCTIONS - ( 13 Nov 2019 01:00 )  Alb: 2.9 g/dL / Pro: 5.8 g/dL / ALK PHOS: 104 u/L / ALT: 29 u/L / AST: 119 u/L / GGT: x           PT/INR - ( 12 Nov 2019 17:04 )   PT: 19.5 SEC;   INR: 1.73     PTT - ( 12 Nov 2019 17:04 )  PTT:29.0 SEC        Endoscopy    Impression:          - Non-bleeding large (> 5 mm) esophageal varices found                        in lower and mid esophagus. No red lydia sign seen. Not                        likely the source of patient bleeding.                       - Localized area of erythematous and edematous mucosa in                        the lesser curvature with ulceration. Biopsied.                       - Portal hypertensive gastropathy.                       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  Recommendation:      - Return patient to [facility].                       - Clear liquid diet today and advanced as tolerated.                       - Stop octreotide infusion as bleeding not from varices.                       - Start non selective B-Blocker for large varices as                        primary prophylaxis.                       - Given Vitamin K for coagulopathy and minimal bleeding                        after bipsies.                       - Continue pantoprazole 40 mg IV BID.                       - Consider EUS for further evaluation of duodenal mass.                       - Await pathology results.            Imaging Personally Reviewed:  [ ] YES  [ ] NO    Consultant(s) Notes Reviewed:  [x] YES  [ ] NO  Care Discussed with Consultants/Other Providers [x] YES  [ ] NO

## 2019-11-14 NOTE — DISCHARGE NOTE PROVIDER - NSDCFUADDINST_GEN_ALL_CORE_FT
Please take antibiotics for 5 more days (end date 11/19).    Please continue to take pantoprazole each morning, 30 minutes before breakfast.    Please take propranolol twice a day    Please follow with gastroenterology for outpatient endoscopy for monitoring.

## 2019-11-14 NOTE — DISCHARGE NOTE PROVIDER - HOSPITAL COURSE
58 yo man with history of alcohol abuse (~5 drinks daily of gin) who presents with hematemesis since last night. Pt states that last night he noticed black colored hematemesis followed by red colored vomit last night and today. He had 3 episodes of red colored vomit today and states it was a "nice amount" but could not further quantify, as well as an episode of black colored stools. He denies any nausea or abdominal pain prior to the onset of this episodes, but notes that he was nauseous and had some abdominal pain after coming to the hospital. Prior to the onset of this episode, he notes that he has had  increasing abdominal girth for the past few months, and has 5 alcoholic shots per day for many years with his last drink Monday night, but denies any pain with food or weight loss. He denies any prior episodes, but states that he intermittently has some black colored stools over the past few years. No previous EGD or colonoscopy as per pt. He denies any fever, lightheadedness, chest pain, palpitations, or shortness of breath.        In the ED: Tmax:98.7F , /63, , resp=12, sat =100% on RA. Labs: hgb = 8.5, lactate 4.2-->2.4 s/p 2L IVF. He was made NPO and started on a PPI drip and octreotide, as well as ceftriaxone. He was given 1u pRBCs with appropriate response.  Abdominal ultrasound showed trace hepatic ascites. CT A/P showed hepatic steatosis.         Patient was subsequently admitted to medicine for management of hematemesis. Since admission from the ED, the patient has not had any more episodes of hematemesis, dark stools or any signs of bleeding. Patient was kept on an octreotide drip, PPI drip and kept NPO. Patient found to have trace hepatic ascites, which was not large enough for diagnostic paracentesis, though SBP less likely considering afebrile, nontender abdomen, no leukocytosis and remains well. But SBP prophylaxis (Ceftriaxone) was given since patient had long term alcohol usage. No signs of withdrawal during this hospitalization but CIWA protocol was placed just in case. GI and hepatology on board. Endoscopy showed multiple nonbleeding varices, but a bleeding peptic ulcer with submucosal mass in the duodenal bulb. Pt discontinued on octreotide and PPI gtt changed to BID. Course complicated by fever to 102.6F, rigors, chills, and persistent cough after endoscopy with CXR showing new left sided ill-defined opacity with new leukocytosis to 13, meeting sepsis criteria from most likely aspiration pneumonia. Metronidazole was added to the regimen. Patient remained well afterwards and tolerated PO diet without issue. 58 yo man with history of alcohol abuse (~5 drinks daily of gin) who presents with hematemesis since last night. Pt states that last night he noticed black colored hematemesis followed by red colored vomit last night and today. He had 3 episodes of red colored vomit today and states it was a "nice amount" but could not further quantify, as well as an episode of black colored stools. He denies any nausea or abdominal pain prior to the onset of this episodes, but notes that he was nauseous and had some abdominal pain after coming to the hospital. Prior to the onset of this episode, he notes that he has had  increasing abdominal girth for the past few months, and has 5 alcoholic shots per day for many years with his last drink Monday night, but denies any pain with food or weight loss. He denies any prior episodes, but states that he intermittently has some black colored stools over the past few years. No previous EGD or colonoscopy as per pt. He denies any fever, lightheadedness, chest pain, palpitations, or shortness of breath.        In the ED: Tmax:98.7F , /63, , resp=12, sat =100% on RA. Labs: hgb = 8.5, lactate 4.2-->2.4 s/p 2L IVF. He was made NPO and started on a PPI drip and octreotide, as well as ceftriaxone. He was given 1u pRBCs with appropriate response.  Abdominal ultrasound showed trace hepatic ascites. CT A/P showed hepatic steatosis.         Patient was subsequently admitted to medicine for management of hematemesis. Since admission from the ED, the patient has not had any more episodes of hematemesis, dark stools or any signs of bleeding. Patient was kept on an octreotide drip, PPI drip and kept NPO. Patient found to have trace hepatic ascites, which was not large enough for diagnostic paracentesis, though SBP less likely considering afebrile, nontender abdomen, no leukocytosis and remains well. But SBP prophylaxis (Ceftriaxone) was given since patient had long term alcohol usage. No signs of withdrawal during this hospitalization but CIWA protocol was placed just in case. GI and hepatology on board. Endoscopy showed multiple nonbleeding varices, but a bleeding peptic ulcer with submucosal mass in the duodenal bulb. Pt discontinued on octreotide and PPI gtt changed to BID. Course complicated by fever to 102.6F, rigors, chills, and persistent cough after endoscopy with CXR showing new left sided ill-defined opacity with new leukocytosis to 13, meeting sepsis criteria from most likely aspiration pneumonia. Metronidazole was added to the regimen. Patient remained well afterwards and tolerated PO diet without issue. He will be transitioned home to Augmentin for a total of 7 days. Patient to f/u with GI for EUS as outpatient. Otherwise patient tolerating PO, having normal BM (without blood), with stable hemoglobin, urinating normally and hemodynamically stable. He is ready for safe medical discharge with close outpatient follow up. 60 yo man with history of alcohol abuse (~5 drinks daily of gin) who presents with hematemesis since last night. Pt states that last night he noticed black colored hematemesis followed by red colored vomit last night and today. He had 3 episodes of red colored vomit today and states it was a "nice amount" but could not further quantify, as well as an episode of black colored stools. He denies any nausea or abdominal pain prior to the onset of this episodes, but notes that he was nauseous and had some abdominal pain after coming to the hospital. Prior to the onset of this episode, he notes that he has had  increasing abdominal girth for the past few months, and has 5 alcoholic shots per day for many years with his last drink Monday night, but denies any pain with food or weight loss. He denies any prior episodes, but states that he intermittently has some black colored stools over the past few years. No previous EGD or colonoscopy as per pt. He denies any fever, lightheadedness, chest pain, palpitations, or shortness of breath.        In the ED: Tmax:98.7F , /63, , resp=12, sat =100% on RA. Labs: hgb = 8.5, lactate 4.2-->2.4 s/p 2L IVF. He was made NPO and started on a PPI drip and octreotide, as well as ceftriaxone. He was given 1u pRBCs with appropriate response.  Abdominal ultrasound showed trace hepatic ascites. CT A/P showed hepatic steatosis.         Patient was subsequently admitted to medicine for management of hematemesis. Since admission from the ED, the patient has not had any more episodes of hematemesis, dark stools or any signs of bleeding. Patient was kept on an octreotide drip, PPI drip and kept NPO. Patient found to have trace hepatic ascites, which was not large enough for diagnostic paracentesis, though SBP less likely considering afebrile, nontender abdomen, no leukocytosis and remains well. But SBP prophylaxis (Ceftriaxone) was given since patient had long term alcohol usage. No signs of withdrawal during this hospitalization but CIWA protocol was placed just in case. GI and hepatology on board. Endoscopy showed multiple nonbleeding varices, but a bleeding peptic ulcer with submucosal mass in the duodenal bulb. Pt discontinued on octreotide and PPI gtt changed to BID. Course complicated by fever to 102.6F, rigors, chills, and persistent cough after endoscopy with CXR showing new left sided ill-defined opacity with new leukocytosis to 13, meeting sepsis criteria from most likely aspiration pneumonia. Metronidazole was added to the regimen. Patient remained well afterwards and tolerated PO diet without issue. He will be transitioned home to Augmentin for a total of 7 days. Patient to f/u with GI for EUS as outpatient. Otherwise patient tolerating PO, having normal BM (without blood), with stable hemoglobin, urinating normally and hemodynamically stable. He is ready for safe medical discharge with close outpatient follow up. Ptc ounseled about alcohol cessation.

## 2019-11-14 NOTE — DISCHARGE NOTE PROVIDER - NSDCFUSCHEDAPPT_GEN_ALL_CORE_FT
LILIBETH US ; 12/27/2019 ; NPP Med Int [address] [facility] LILIBETH US ; 12/27/2019 ; NPP Med Int [address] [facility]

## 2019-11-14 NOTE — PROGRESS NOTE ADULT - PROBLEM SELECTOR PLAN 8
Diet: clear liquid, aspiration precautions  DVT: no ppx at this point secondary to GIB, low platelets, SCDs  Dispo: to home, PT - patient currently coagulopathic (INR downtrending since admission) with low albumin, increased T. bili and increased AST, ALT (2:1)  - hepatic steatosis on ultrasound, hepatosplenomegaly on exam  - appreciate hepatology recommendations, will obtain and f/u on labs  - Vitamin K 5mg for 3 days  - coags improving today  - trend CMP and coag panel daily  - encourage alcohol cessation

## 2019-11-14 NOTE — PHYSICAL THERAPY INITIAL EVALUATION ADULT - CRITERIA FOR SKILLED THERAPEUTIC INTERVENTIONS
PT evaluation done.Patient is functioning well, ambulates without assistive device and without any loss of balance, hence Restorative PT is not indicated at this time.

## 2019-11-14 NOTE — SBIRT NOTE ADULT - NSSBIRTBRIEFINTDET_GEN_A_CORE
Patient at first refused any resources. SW educated and did brief intervention. SW provided patient with harmful usage and risk handout, patient was receptive after education.

## 2019-11-14 NOTE — PROGRESS NOTE ADULT - PROBLEM SELECTOR PLAN 4
- bleeding ulcer found on endoscopy   - change protonix gtt to 40mg IV BID  - encourage alcohol cessation

## 2019-11-14 NOTE — DISCHARGE NOTE PROVIDER - CARE PROVIDER_API CALL
Resident, Clinic  [address] [facility]  [address]  [place] [address]  [phone]  Phone: [phone]  Fax: (   )    -  Follow Up Time:

## 2019-11-14 NOTE — PROGRESS NOTE ADULT - SUBJECTIVE AND OBJECTIVE BOX
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  Tolerating Clear Liquid diet, passing BM     PAST MEDICAL & SURGICAL HISTORY  Alcohol abuse  S/P appendectomy  H/O exploratory laparotomy      ALLERGIES:  No Known Allergies      MEDICATIONS:  MEDICATIONS  (STANDING):  cefTRIAXone   IVPB 1000 milliGRAM(s) IV Intermittent every 24 hours  folic acid 1 milliGRAM(s) Oral daily  influenza   Vaccine 0.5 milliLiter(s) IntraMuscular once  metroNIDAZOLE  IVPB 500 milliGRAM(s) IV Intermittent once  metroNIDAZOLE  IVPB      metroNIDAZOLE  IVPB 500 milliGRAM(s) IV Intermittent every 8 hours  multivitamin 1 Tablet(s) Oral daily  pantoprazole  Injectable 40 milliGRAM(s) IV Push two times a day  phytonadione   Solution 5 milliGRAM(s) Oral daily  propranolol 20 milliGRAM(s) Oral two times a day  thiamine 100 milliGRAM(s) Oral daily    MEDICATIONS  (PRN):  ibuprofen  Tablet. 400 milliGRAM(s) Oral once PRN Temp greater or equal to 38C (100.4F)  LORazepam     Tablet 2 milliGRAM(s) Oral every 2 hours PRN Symptom-triggered 2 point increase in CIWA-Ar  LORazepam   Injectable 2 milliGRAM(s) IV Push every 1 hour PRN Symptom-triggered: each CIWA -Ar score 8 or GREATER      REVIEW OF SYSTEMS  General:  c/o  fevers  Eyes:  No reported pain   ENT:  No sore throat   NECK: No stiffness   CV:  No chest pain   Resp:  No shortness of breath  GI:  See HPI  :  No dysuria  Muscle:  No weakness  Neuro:  No tingling  Endocrine:  No polyuria  Heme:  No ecchymosis        VITALS:   T(F): 98.2 (11-14 @ 05:25), Max: 102.6 (11-13 @ 18:52)  HR: 77 (11-14 @ 05:25) (77 - 122)  BP: 107/56 (11-14 @ 05:25) (107/56 - 150/82)  BP(mean): 88 (11-13 @ 12:39) (88 - 88)  RR: 16 (11-14 @ 05:25) (14 - 18)  SpO2: 98% (11-14 @ 05:25) (96% - 100%)        PHYSICAL EXAM:  Gen: Comfortable   EYES: No scleral icterus   LUNG: Clear to auscultation bilaterally;  HEART: s1 and s2 heard   ABDOMEN: Soft, +BS, + mild  abd distension , no Abdominal Tenderness, No guarding, No Morrow Sign   Neuro: AAO x3 , no asterix   Ext: no edema      Blood Work :                        8.6    13.80 )-----------( 53       ( 14 Nov 2019 06:10 )             25.4     PT/INR - ( 14 Nov 2019 06:10 )  INR: 1.51          PTT - ( 14 Nov 2019 06:10 )  PTT:27.4<L>  11-14    134<L>  |  99  |  9   ----------------------------<  104<H>  3.7   |  26  |  0.76    Ca    7.9<L>      14 Nov 2019 06:10  Phos  1.9     11-14  Mg     1.8     11-14      CBC -  ( 14 Nov 2019 06:10 )  Hemoglobin : 8.6    CBC -  ( 13 Nov 2019 17:12 )  Hemoglobin : 9.1    CBC -  ( 13 Nov 2019 07:05 )  Hemoglobin : 8.4    CBC -  ( 13 Nov 2019 01:00 )  Hemoglobin : 7.3    CBC -  ( 12 Nov 2019 17:04 )  Hemoglobin : 8.5      LIVER FUNCTIONS - ( 14 Nov 2019 06:10 )  Alb: 2.8 [3.3 - 5.0] / Pro: 6.0 [6.0 - 8.3] / ALK PHOS: 90 [40 - 120] / ALT: 26 [4 - 41] / AST: 90 [4 - 40] / GGT: x     LIVER FUNCTIONS - ( 13 Nov 2019 01:00 )  Alb: 2.9 [3.3 - 5.0] / Pro: 5.8 [6.0 - 8.3] / ALK PHOS: 104 [40 - 120] / ALT: 29 [4 - 41] / AST: 119 [4 - 40] / GGT: x     LIVER FUNCTIONS - ( 12 Nov 2019 17:04 )  Alb: 3.3 [3.3 - 5.0] / Pro: 6.7 [6.0 - 8.3] / ALK PHOS: 131 [40 - 120] / ALT: 34 [4 - 41] / AST: 143 [4 - 40] / GGT: x         RADIOLOGY:      < from: CT Angio Abdomen and Pelvis w/ IV Cont (11.12.19 @ 19:16) >    LOWER CHEST: The lung bases are clear. The visualized heart is normal in   size.    LIVER: Within normal limits.  BILE DUCTS: Normal caliber.  GALLBLADDER: Cholelithiasis.  SPLEEN: Within normal limits.  PANCREAS: Pancreas is normal. No infiltration of the peripancreatic fat.  ADRENALS: Within normal limits.  KIDNEYS/URETERS: Within normal limits.    BLADDER: The bladder is normal.  REPRODUCTIVE ORGANS: The prostate glandis unremarkable.    BOWEL: The stomach is decompressed. The small bowel is normal in caliber.   The large bowel is normal in caliber. No extravasation of intravenous   contrast there is convincingly shown. Appendix is not visualized.  PERITONEUM: No free air or free fluid.  VESSELS: The aorta is normal in caliber. The major branches of the aorta   are proximally patent. Atheromatous disease of the aorta. The portal vein   and hepatic veins are patent.  RETROPERITONEUM/LYMPH NODES: No lymphadenopathy.    ABDOMINAL WALL: Within normal limits.  BONES: Within normal limits.    IMPRESSION:     1.  No bowel obstruction.  2.  Cholelithiasis.  3.  The appendix is not visualized, however, there is no right lower   quadrant inflammation to suggest appendicitis.  4.  The pancreas is normal.      < end of copied text >    < from: US Abdomen Limited (11.12.19 @ 18:15) >    FINDINGS:    Liver: Hepatic steatosis.    Bile ducts: Normal caliber. Common bile duct measures 5 mm.     Gallbladder: Intraluminal echogenic shadowing foci compatible with   cholelithiasis. The gallbladder is nondistended. The no gallbladder wall   thickening. The gallbladder wall measures 2 mm. No positive sonographic   Morrow sign.        Pancreas: Visualized portions are within normal limits.    Right kidney: 13 point cm. No hydronephrosis.     Ascites: Trace perihepatic ascites.    IVC: Visualized portions are within normal limits.    IMPRESSION:     1.  No convincing sonographic findings to suggest acute cholecystitis.  2.  Cholelithiasis.    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  Tolerating Clear Liquid diet, passing BM     PAST MEDICAL & SURGICAL HISTORY  Alcohol abuse  S/P appendectomy  H/O exploratory laparotomy      ALLERGIES:  No Known Allergies      MEDICATIONS:  MEDICATIONS  (STANDING):  cefTRIAXone   IVPB 1000 milliGRAM(s) IV Intermittent every 24 hours  folic acid 1 milliGRAM(s) Oral daily  influenza   Vaccine 0.5 milliLiter(s) IntraMuscular once  metroNIDAZOLE  IVPB 500 milliGRAM(s) IV Intermittent once  metroNIDAZOLE  IVPB      metroNIDAZOLE  IVPB 500 milliGRAM(s) IV Intermittent every 8 hours  multivitamin 1 Tablet(s) Oral daily  pantoprazole  Injectable 40 milliGRAM(s) IV Push two times a day  phytonadione   Solution 5 milliGRAM(s) Oral daily  propranolol 20 milliGRAM(s) Oral two times a day  thiamine 100 milliGRAM(s) Oral daily    MEDICATIONS  (PRN):  ibuprofen  Tablet. 400 milliGRAM(s) Oral once PRN Temp greater or equal to 38C (100.4F)  LORazepam     Tablet 2 milliGRAM(s) Oral every 2 hours PRN Symptom-triggered 2 point increase in CIWA-Ar  LORazepam   Injectable 2 milliGRAM(s) IV Push every 1 hour PRN Symptom-triggered: each CIWA -Ar score 8 or GREATER      REVIEW OF SYSTEMS  General:  c/o  fevers  Eyes:  No reported pain   ENT:  No sore throat   NECK: No stiffness   CV:  No chest pain   Resp:  No shortness of breath  GI:  See HPI  :  No dysuria  Muscle:  No weakness  Neuro:  No tingling  Endocrine:  No polyuria  Heme:  No ecchymosis        VITALS:   T(F): 98.2 (11-14 @ 05:25), Max: 102.6 (11-13 @ 18:52)  HR: 77 (11-14 @ 05:25) (77 - 122)  BP: 107/56 (11-14 @ 05:25) (107/56 - 150/82)  BP(mean): 88 (11-13 @ 12:39) (88 - 88)  RR: 16 (11-14 @ 05:25) (14 - 18)  SpO2: 98% (11-14 @ 05:25) (96% - 100%)        PHYSICAL EXAM:  Gen: Comfortable   EYES: No scleral icterus   LUNG: Clear to auscultation bilaterally;  HEART: s1 and s2 heard   ABDOMEN: Soft, +BS, + mild  abd distension , no Abdominal Tenderness, No guarding, No Morrow Sign   Neuro: AAO x3 , no asterix   Ext: no edema      Blood Work :                        8.6    13.80 )-----------( 53       ( 14 Nov 2019 06:10 )             25.4     PT/INR - ( 14 Nov 2019 06:10 )  INR: 1.51          PTT - ( 14 Nov 2019 06:10 )  PTT:27.4<L>  11-14    134<L>  |  99  |  9   ----------------------------<  104<H>  3.7   |  26  |  0.76    Ca    7.9<L>      14 Nov 2019 06:10  Phos  1.9     11-14  Mg     1.8     11-14      CBC -  ( 14 Nov 2019 06:10 )  Hemoglobin : 8.6    CBC -  ( 13 Nov 2019 17:12 )  Hemoglobin : 9.1    CBC -  ( 13 Nov 2019 07:05 )  Hemoglobin : 8.4    CBC -  ( 13 Nov 2019 01:00 )  Hemoglobin : 7.3    CBC -  ( 12 Nov 2019 17:04 )  Hemoglobin : 8.5      LIVER FUNCTIONS - ( 14 Nov 2019 06:10 )  Alb: 2.8 [3.3 - 5.0] / Pro: 6.0 [6.0 - 8.3] / ALK PHOS: 90 [40 - 120] / ALT: 26 [4 - 41] / AST: 90 [4 - 40] / GGT: x     LIVER FUNCTIONS - ( 13 Nov 2019 01:00 )  Alb: 2.9 [3.3 - 5.0] / Pro: 5.8 [6.0 - 8.3] / ALK PHOS: 104 [40 - 120] / ALT: 29 [4 - 41] / AST: 119 [4 - 40] / GGT: x     LIVER FUNCTIONS - ( 12 Nov 2019 17:04 )  Alb: 3.3 [3.3 - 5.0] / Pro: 6.7 [6.0 - 8.3] / ALK PHOS: 131 [40 - 120] / ALT: 34 [4 - 41] / AST: 143 [4 - 40] / GGT: x         RADIOLOGY:      < from: CT Angio Abdomen and Pelvis w/ IV Cont (11.12.19 @ 19:16) >    LOWER CHEST: The lung bases are clear. The visualized heart is normal in   size.    LIVER: Within normal limits.  BILE DUCTS: Normal caliber.  GALLBLADDER: Cholelithiasis.  SPLEEN: Within normal limits.  PANCREAS: Pancreas is normal. No infiltration of the peripancreatic fat.  ADRENALS: Within normal limits.  KIDNEYS/URETERS: Within normal limits.    BLADDER: The bladder is normal.  REPRODUCTIVE ORGANS: The prostate glandis unremarkable.    BOWEL: The stomach is decompressed. The small bowel is normal in caliber.   The large bowel is normal in caliber. No extravasation of intravenous   contrast there is convincingly shown. Appendix is not visualized.  PERITONEUM: No free air or free fluid.  VESSELS: The aorta is normal in caliber. The major branches of the aorta   are proximally patent. Atheromatous disease of the aorta. The portal vein   and hepatic veins are patent.  RETROPERITONEUM/LYMPH NODES: No lymphadenopathy.    ABDOMINAL WALL: Within normal limits.  BONES: Within normal limits.    IMPRESSION:     1.  No bowel obstruction.  2.  Cholelithiasis.  3.  The appendix is not visualized, however, there is no right lower   quadrant inflammation to suggest appendicitis.  4.  The pancreas is normal.      < end of copied text >    < from: US Abdomen Limited (11.12.19 @ 18:15) >    FINDINGS:    Liver: Hepatic steatosis.    Bile ducts: Normal caliber. Common bile duct measures 5 mm.     Gallbladder: Intraluminal echogenic shadowing foci compatible with   cholelithiasis. The gallbladder is nondistended. The no gallbladder wall   thickening. The gallbladder wall measures 2 mm. No positive sonographic   Morrow sign.        Pancreas: Visualized portions are within normal limits.    Right kidney: 13 point cm. No hydronephrosis.     Ascites: Trace perihepatic ascites.    IVC: Visualized portions are within normal limits.    IMPRESSION:     1.  No convincing sonographic findings to suggest acute cholecystitis.  2.  Cholelithiasis.    < end of copied text >    < from: Xray Chest 1 View AP/PA (11.13.19 @ 18:34) >  INTERPRETATION:     The heart is not enlarged. The mediastinum and radha are unremarkable.  The right lung is clear.  There is ill-defined patchy left midlung opacity raising concern for   pneumonia. No pleural effusion or pneumothorax is seen.  No acute bony abnormality noted.      IMPRESSION:  Ill-defined patchy left midlung opacity raising concern for   pneumonia. Recommend follow-up chest x-ray.    < end of copied text >    < from: Xray Abdomen 1 View-Upright Only (11.13.19 @ 18:34) >  IMPRESSION: No evidence of free intraperitoneal air.    < end of copied text >

## 2019-11-14 NOTE — PROGRESS NOTE ADULT - PROBLEM SELECTOR PLAN 7
- patient currently coagulopathic (INR downtrending since admission) with low albumin, increased T. bili and increased AST, ALT (2:1)  - hepatic steatosis on ultrasound, hepatosplenomegaly on exam  - appreciate hepatology recommendations, will obtain and f/u on labs  - Vitamin K 5mg for 3 days  - trend CMP and coag panel daily  - encourage alcohol cessation - large liver span on exam, abdominal ultrasound and CTA A/P demonstrate hepatic steatosis  - elevated INR, decreased albumin  - c/w ceftriaxone 1g q24 hours for 7 days, trace hepatic ascites, not enough for diagnostic paracentesis  - hepatic steatosis found on US, hepatology following, appreciate recs  - pending liver labs and continue with vitamin K 5mg for 3 days

## 2019-11-14 NOTE — PROGRESS NOTE ADULT - PROBLEM SELECTOR PLAN 6
- large liver span on exam, abdominal ultrasound and CTA A/P demonstrate hepatic steatosis  - elevated INR, decreased albumin  - c/w ceftriaxone 1g q24 hours for 7 days, trace hepatic ascites, not enough for diagnostic paracentesis  - hepatic steatosis found on US, hepatology following, appreciate recs  - pending liver labs and continue with vitamin K 5mg for 3 days - pt with 5 shots of etoh per day, last drink was last night  - no history of etoh withdrawal  - monitor on Ativan symptom triggered CIWA for now, no Ativan taper necessary for the moment, CIWA has been consistently 1  - started on thiamine IV x3 days, followed by PO  - started on folic acid  - SBIRT- patient refused any services

## 2019-11-15 ENCOUNTER — TRANSCRIPTION ENCOUNTER (OUTPATIENT)
Age: 59
End: 2019-11-15

## 2019-11-15 VITALS
SYSTOLIC BLOOD PRESSURE: 122 MMHG | RESPIRATION RATE: 17 BRPM | DIASTOLIC BLOOD PRESSURE: 71 MMHG | HEART RATE: 70 BPM | OXYGEN SATURATION: 100 % | TEMPERATURE: 97 F

## 2019-11-15 LAB
ALBUMIN SERPL ELPH-MCNC: 2.9 G/DL — LOW (ref 3.3–5)
ALP SERPL-CCNC: 110 U/L — SIGNIFICANT CHANGE UP (ref 40–120)
ALT FLD-CCNC: 25 U/L — SIGNIFICANT CHANGE UP (ref 4–41)
ANION GAP SERPL CALC-SCNC: 9 MMO/L — SIGNIFICANT CHANGE UP (ref 7–14)
APTT BLD: 28.6 SEC — SIGNIFICANT CHANGE UP (ref 27.5–36.3)
AST SERPL-CCNC: 82 U/L — HIGH (ref 4–40)
BILIRUB SERPL-MCNC: 1.2 MG/DL — SIGNIFICANT CHANGE UP (ref 0.2–1.2)
BUN SERPL-MCNC: 10 MG/DL — SIGNIFICANT CHANGE UP (ref 7–23)
CALCIUM SERPL-MCNC: 7.8 MG/DL — LOW (ref 8.4–10.5)
CHLORIDE SERPL-SCNC: 101 MMOL/L — SIGNIFICANT CHANGE UP (ref 98–107)
CO2 SERPL-SCNC: 22 MMOL/L — SIGNIFICANT CHANGE UP (ref 22–31)
CREAT SERPL-MCNC: 0.74 MG/DL — SIGNIFICANT CHANGE UP (ref 0.5–1.3)
GAS PNL BLDMV: SIGNIFICANT CHANGE UP
GAS PNL BLDMV: SIGNIFICANT CHANGE UP
GLUCOSE SERPL-MCNC: 109 MG/DL — HIGH (ref 70–99)
HCT VFR BLD CALC: 25.4 % — LOW (ref 39–50)
HGB BLD-MCNC: 8.6 G/DL — LOW (ref 13–17)
INR BLD: 1.57 — HIGH (ref 0.88–1.17)
LKM AB SER-ACNC: < 20.1 UNITS — SIGNIFICANT CHANGE UP (ref 0–20)
MAGNESIUM SERPL-MCNC: 1.8 MG/DL — SIGNIFICANT CHANGE UP (ref 1.6–2.6)
MCHC RBC-ENTMCNC: 33.9 % — SIGNIFICANT CHANGE UP (ref 32–36)
MCHC RBC-ENTMCNC: 35.1 PG — HIGH (ref 27–34)
MCV RBC AUTO: 103.7 FL — HIGH (ref 80–100)
MITOCHONDRIA AB SER-ACNC: SIGNIFICANT CHANGE UP
NRBC # FLD: 0.02 K/UL — SIGNIFICANT CHANGE UP (ref 0–0)
PHOSPHATE SERPL-MCNC: 1.4 MG/DL — LOW (ref 2.5–4.5)
PLATELET # BLD AUTO: 61 K/UL — LOW (ref 150–400)
PMV BLD: 11.3 FL — SIGNIFICANT CHANGE UP (ref 7–13)
POTASSIUM SERPL-MCNC: 4.2 MMOL/L — SIGNIFICANT CHANGE UP (ref 3.5–5.3)
POTASSIUM SERPL-SCNC: 4.2 MMOL/L — SIGNIFICANT CHANGE UP (ref 3.5–5.3)
PROT SERPL-MCNC: 6 G/DL — SIGNIFICANT CHANGE UP (ref 6–8.3)
PROTHROM AB SERPL-ACNC: 17.7 SEC — HIGH (ref 9.8–13.1)
RBC # BLD: 2.45 M/UL — LOW (ref 4.2–5.8)
RBC # FLD: 16.1 % — HIGH (ref 10.3–14.5)
SODIUM SERPL-SCNC: 132 MMOL/L — LOW (ref 135–145)
WBC # BLD: 10.73 K/UL — HIGH (ref 3.8–10.5)
WBC # FLD AUTO: 10.73 K/UL — HIGH (ref 3.8–10.5)

## 2019-11-15 PROCEDURE — 99239 HOSP IP/OBS DSCHRG MGMT >30: CPT | Mod: GC

## 2019-11-15 PROCEDURE — 99232 SBSQ HOSP IP/OBS MODERATE 35: CPT | Mod: GC

## 2019-11-15 RX ORDER — THIAMINE MONONITRATE (VIT B1) 100 MG
1 TABLET ORAL
Qty: 30 | Refills: 1
Start: 2019-11-15 | End: 2020-01-13

## 2019-11-15 RX ORDER — PROPRANOLOL HCL 160 MG
1 CAPSULE, EXTENDED RELEASE 24HR ORAL
Qty: 60 | Refills: 1
Start: 2019-11-15 | End: 2020-01-13

## 2019-11-15 RX ORDER — POTASSIUM PHOSPHATE, MONOBASIC POTASSIUM PHOSPHATE, DIBASIC 236; 224 MG/ML; MG/ML
30 INJECTION, SOLUTION INTRAVENOUS ONCE
Refills: 0 | Status: DISCONTINUED | OUTPATIENT
Start: 2019-11-15 | End: 2019-11-15

## 2019-11-15 RX ORDER — MAGNESIUM OXIDE 400 MG ORAL TABLET 241.3 MG
400 TABLET ORAL ONCE
Refills: 0 | Status: COMPLETED | OUTPATIENT
Start: 2019-11-15 | End: 2019-11-15

## 2019-11-15 RX ORDER — SODIUM,POTASSIUM PHOSPHATES 278-250MG
1 POWDER IN PACKET (EA) ORAL ONCE
Refills: 0 | Status: COMPLETED | OUTPATIENT
Start: 2019-11-15 | End: 2019-11-15

## 2019-11-15 RX ORDER — PANTOPRAZOLE SODIUM 20 MG/1
1 TABLET, DELAYED RELEASE ORAL
Qty: 30 | Refills: 1
Start: 2019-11-15 | End: 2020-01-13

## 2019-11-15 RX ORDER — FOLIC ACID 0.8 MG
1 TABLET ORAL
Qty: 30 | Refills: 1
Start: 2019-11-15 | End: 2020-01-13

## 2019-11-15 RX ADMIN — Medication 20 MILLIGRAM(S): at 05:02

## 2019-11-15 RX ADMIN — Medication 100 MILLIGRAM(S): at 12:42

## 2019-11-15 RX ADMIN — MAGNESIUM OXIDE 400 MG ORAL TABLET 400 MILLIGRAM(S): 241.3 TABLET ORAL at 08:24

## 2019-11-15 RX ADMIN — Medication 1 MILLIGRAM(S): at 12:42

## 2019-11-15 RX ADMIN — Medication 1 PACKET(S): at 08:24

## 2019-11-15 RX ADMIN — Medication 1 TABLET(S): at 12:42

## 2019-11-15 RX ADMIN — PANTOPRAZOLE SODIUM 40 MILLIGRAM(S): 20 TABLET, DELAYED RELEASE ORAL at 05:03

## 2019-11-15 RX ADMIN — Medication 85 MILLIMOLE(S): at 12:42

## 2019-11-15 RX ADMIN — Medication 100 MILLIGRAM(S): at 00:27

## 2019-11-15 RX ADMIN — Medication 100 MILLIGRAM(S): at 08:24

## 2019-11-15 NOTE — PROGRESS NOTE ADULT - PROBLEM SELECTOR PLAN 1
- meeting sepsis criteria- new leukocytosis of 13, febrile (102.6) with new ill defined lung opacity on CXR concerning for pneumonia (11/13-11/14)  - c/w Ceftriaxone 1g [id number] for now and flagyl 500mg q8hour for anaerobe coverage (day 2/7 for Flagyl)  - UA showed RBCs and moderate blood, blood cultures negative

## 2019-11-15 NOTE — MEDICAL STUDENT PROGRESS NOTE(EDUCATION) - NS MD HP STUD ASPLAN ASSES FT
59-year-old man with PMH EtOH abuse admitted for hematemesis, with non-bleeding esophageal varices on EGD, bleeding gastric ulcer on EGD, ?submucosal mass in duodenum, Hb stable, course complicated by aspiration pneumonia with fever, chills, and cough, now asymptomatic.

## 2019-11-15 NOTE — PROGRESS NOTE ADULT - SUBJECTIVE AND OBJECTIVE BOX
Chief Complaint:  Patient is a 59y old  Male who presents with a chief complaint of hematemesis (15 Nov 2019 06:49)      Interval Events: Hgb8.6 today, no blood transfusion needed overnight. Afebrile. WBC downtrending.   ROS: All 12 point system except listed above were otherwise negative.    Allergies:  No Known Allergies        Hospital Medications:  cefTRIAXone   IVPB 1000 milliGRAM(s) IV Intermittent every 24 hours  folic acid 1 milliGRAM(s) Oral daily  influenza   Vaccine 0.5 milliLiter(s) IntraMuscular once  LORazepam     Tablet 2 milliGRAM(s) Oral every 2 hours PRN  LORazepam   Injectable 2 milliGRAM(s) IV Push every 1 hour PRN  metroNIDAZOLE  IVPB      metroNIDAZOLE  IVPB 500 milliGRAM(s) IV Intermittent every 8 hours  multivitamin 1 Tablet(s) Oral daily  pantoprazole  Injectable 40 milliGRAM(s) IV Push two times a day  propranolol 20 milliGRAM(s) Oral two times a day  sodium phosphate IVPB 30 milliMole(s) IV Intermittent once  thiamine 100 milliGRAM(s) Oral daily      PMHX/PSHX:  Alcohol abuse  S/P appendectomy  H/O exploratory laparotomy      Family history:  Family history of bone cancer    There is no family history of peptic ulcer disease, gastric cancer, colon polyps, colon cancer, celiac disease, biliary, hepatic, or pancreatic disease.  None of the female relatives have breast, uterine, or ovarian cancer.     PHYSICAL EXAM:   Vital Signs:  Vital Signs Last 24 Hrs  T(C): 36.8 (15 Nov 2019 04:58), Max: 37.7 (2019 20:51)  T(F): 98.2 (15 Nov 2019 04:58), Max: 99.8 (2019 20:51)  HR: 79 (15 Nov 2019 04:58) (79 - 91)  BP: 112/69 (15 Nov 2019 04:58) (105/61 - 112/69)  BP(mean): --  RR: 18 (15 Nov 2019 04:58) (17 - 18)  SpO2: 99% (15 Nov 2019 04:58) (97% - 99%)  Daily     Daily     EYES: No scleral icterus   LUNG: Clear to auscultation bilaterally;  HEART: s1 and s2 heard   ABDOMEN: Soft, +BS, + mild  abd distension , no Abdominal Tenderness, No guarding, No Morrow Sign   Neuro: AAO x3 , no asterix   Ext: no edema     LABS:                        8.6    10.73 )-----------( 61       ( 15 Nov 2019 06:30 )             25.4     Mean Cell Volume: 103.7 fL (11-15- @ 06:30)    11-15    132<L>  |  101  |  10  ----------------------------<  109<H>  4.2   |  22  |  0.74    Ca    7.8<L>      15 Nov 2019 06:30  Phos  1.4     11-15  Mg     1.8     11-15    TPro  6.0  /  Alb  2.9<L>  /  TBili  1.2  /  DBili  x   /  AST  82<H>  /  ALT  25  /  AlkPhos  110  11-15    LIVER FUNCTIONS - ( 15 Nov 2019 06:30 )  Alb: 2.9 g/dL / Pro: 6.0 g/dL / ALK PHOS: 110 u/L / ALT: 25 u/L / AST: 82 u/L / GGT: x           PT/INR - ( 15 Nov 2019 06:30 )   PT: 17.7 SEC;   INR: 1.57          PTT - ( 15 Nov 2019 06:30 )  PTT:28.6 SEC  Urinalysis Basic - ( 2019 19:00 )    Color: YELLOW / Appearance: CLEAR / S.020 / pH: 5.5  Gluc: NEGATIVE / Ketone: SMALL  / Bili: NEGATIVE / Urobili: TRACE   Blood: MODERATE / Protein: NEGATIVE / Nitrite: NEGATIVE   Leuk Esterase: NEGATIVE / RBC: 26-50 / WBC 0-2   Sq Epi: OCC / Non Sq Epi: x / Bacteria: NEGATIVE                              8.6    10.73 )-----------( 61       ( 15 Nov 2019 06:30 )             25.4                         8.6    13.80 )-----------( 53       ( 2019 06:10 )             25.4                         9.1    7.85  )-----------( 58       ( 2019 17:12 )             26.3                         8.4    8.43  )-----------( 50       ( 2019 07:05 )             24.0                         7.3    9.74  )-----------( 53       ( 2019 01:00 )             20.5     Imaging:    < from: Upper Endoscopy (19 @ 12:34) >  Findings:       Four columns of non-bleeding large (> 5 mm) varices were found in the        esophagus extending from 27 cm to the EGJ at 41 cm from the incisors. No        stigmata of recent bleeding were evident. No red lydia signs or nipple        signs were present.       The Z-line was regular and was found 41 cm from the incisors.       Localized moderately erythematous mucosa without bleeding and a small        ulcer was found on the lesser curvature of the stomach. Biopsies were        taken with a cold forceps for histology.       Mild to moderated portal hypertensive gastropathy was found in the        entire examined stomach.       A large submucosal mass with no bleeding was found in the duodenal bulb.        This was firm when pressed with a biopsy forceps.                               Impression:          - Non-bleeding large (> 5 mm) esophageal varices found                        in lower and mid esophagus. No red lydia sign seen. Not                        likely the source of patient bleeding.                  - Localized area of erythematous and edematous mucosa in                        the lesser curvature with ulceration. Biopsied.                       - Portal hypertensive gastropathy.                       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  Recommendation:      - [facility].                       - Clear liquid diet today and advanced as tolerated.                       - Stop octreotide infusion as bleeding not from varices.                       - Start non selective B-Blocker for large varices as                        primary prophylaxis.                       - Given Vitamin K for coagulopathy and minimal bleeding                        after bipsies.       - Continue pantoprazole 40 mg IV BID.                       - Consider EUS for further evaluation of duodenal mass.                       - Await pathology results.                                                                                 < end of copied text >

## 2019-11-15 NOTE — PROGRESS NOTE ADULT - PROBLEM SELECTOR PLAN 8
- patient currently coagulopathic (INR downtrending since admission) with low albumin, increased T. bili and increased AST, ALT (2:1)  - hepatic steatosis on ultrasound, hepatosplenomegaly on exam  - appreciate hepatology recommendations, will obtain and f/u on labs  - Vitamin K 5mg for 3 days  - coags improving today  - trend CMP and coag panel daily  - encourage alcohol cessation

## 2019-11-15 NOTE — PROGRESS NOTE ADULT - ASSESSMENT
Impression:  #) Hematemesis / Melena with acute blood loss anemia  -Hb trended down from 8.5 to 7.3 received 1 unit prbc hb improved to 8.4 and is stable at 8.6   -associated with thrombocytopenia, Coagulopathy  -S/p EGD on 11/13/2019 -No active bleeding or recent stigmata of bleeding, small gastric ulcer s/p biopsy (likely source of bleeding),non bleeding large EV, portal hypertensive gastropathy and also submucosal lesion in bulb     #) Fever with uptrending leukocytosis- On flagyl  #) Cirrhosis, ROSSI vs alcohol - No nodularity seen on imaging however patient with decreased synthetic function  INR - 1.51 , T.mehrdad - 1.4 , no encephalopathy    MDF - 26.2 improved from 36   Meld Na - 16   No radiological evidence of cirrhosis - no liver cirrhosis ,  no splenomegaly , no ascites   + Esophageal varices, + Portal Hypertensive gastropathy, + trace ascites  + Lab evidence - thrombocytopenia , + coagulopathy , AST > ALT     Recommendations:  -Diet as tolerated  -Monitor CBC  -Continue propranolol  -If CBC remains stable, can switch to PPI PO BID for 6 weeks  -Follow up pathology  -Outpatient EUS for evaluation of duodenal mass  -Repeat EGD after 2 months of PPI to evaluate for healing of gastric ulcer   -Monitor liver enzymes and INR  -Will need outpatient establishment with the liver clinic for cirrhosis management (vaccinations, surveillance EGD etc)  -Follow up remaining liver work up Impression:  #) Hematemesis / Melena with acute blood loss anemia  -Hb trended down from 8.5 to 7.3 received 1 unit prbc hb improved to 8.4 and is stable at 8.6   -associated with thrombocytopenia, Coagulopathy  -S/p EGD on 11/13/2019 -No active bleeding or recent stigmata of bleeding, small gastric ulcer s/p biopsy (likely source of bleeding),non bleeding large EV, portal hypertensive gastropathy and also submucosal lesion in bulb     #) Fever with uptrending leukocytosis- On flagyl  #) Cirrhosis, ROSSI vs alcohol - No nodularity seen on imaging however patient with decreased synthetic function  INR - 1.51 , T.mehrdad - 1.4 , no encephalopathy    MDF - 26.2 improved from 36   Meld Na - 16   No radiological evidence of cirrhosis - no liver cirrhosis ,  no splenomegaly , no ascites   + Esophageal varices, + Portal Hypertensive gastropathy, + trace ascites  + Lab evidence - thrombocytopenia , + coagulopathy , AST > ALT     Recommendations:  -Diet as tolerated  -Monitor CBC  -Continue propranolol  -If CBC remains stable, can switch to PPI PO BID for 6 weeks  -Follow up pathology  -Outpatient EUS for evaluation of duodenal mass  -Repeat EGD after 2 months of PPI to evaluate for healing of gastric ulcer   -Monitor liver enzymes and INR  -Will need outpatient establishment with the liver clinic for cirrhosis management (vaccinations, surveillance EGD etc)- #[id number]  -Follow up remaining liver work up Impression:  #) Hematemesis / Melena with acute blood loss anemia  -Hb trended down from 8.5 to 7.3 received 1 unit prbc hb improved to 8.4 and is stable at 8.6   -associated with thrombocytopenia, Coagulopathy  -S/p EGD on 11/13/2019 -No active bleeding or recent stigmata of bleeding, small gastric ulcer s/p biopsy (likely source of bleeding),non bleeding large EV, portal hypertensive gastropathy and also submucosal lesion in bulb     #) Fever with uptrending leukocytosis- On flagyl  #) Cirrhosis, ROSSI vs alcohol - No nodularity seen on imaging however patient with decreased synthetic function  INR - 1.51 , T.mehrdad - 1.4 , no encephalopathy    MDF - 26.2 improved from 36   Meld Na - 16   No radiological evidence of cirrhosis - no liver cirrhosis ,  no splenomegaly , no ascites   + Esophageal varices, + Portal Hypertensive gastropathy, + trace ascites  + Lab evidence - thrombocytopenia , + coagulopathy , AST > ALT     Recommendations:  -Diet as tolerated  -Monitor CBC  -Continue propranolol  -If CBC remains stable, can switch to PPI PO BID for 6 weeks  -Follow up pathology  -Outpatient EUS for evaluation of duodenal mass  -Repeat EGD after 2 months of PPI to evaluate for healing of gastric ulcer   -Monitor liver enzymes and INR  -Will need outpatient establishment with the liver clinic for cirrhosis management (vaccinations, surveillance EGD etc)- #[id number] or [facility] #[id number]  -Follow up remaining liver work up

## 2019-11-15 NOTE — PROGRESS NOTE ADULT - PROBLEM SELECTOR PLAN 7
- large liver span on exam, abdominal ultrasound and CTA A/P demonstrate hepatic steatosis  - elevated INR, decreased albumin  - c/w ceftriaxone 1g q24 hours for 7 days, trace hepatic ascites, not enough for diagnostic paracentesis  - hepatic steatosis found on US, hepatology following, appreciate recs  - pending liver labs and continue with vitamin K 5mg for 3 days  - hepatitis panel negative, ceruloplasmin, A1AT normal

## 2019-11-15 NOTE — PROGRESS NOTE ADULT - SUBJECTIVE AND OBJECTIVE BOX
***************************************************************  COVERING RESIDENT: Ivette Lei, PGY 1  Internal Medicine   Pager: RAUL [phone] | [facility]: [phone]  ***************************************************************    CC: 59 y.o male presenting with hematemesis.     Interval history/subjective  No acute events overnight. As per advanced GI, patient can follow outpatient for EUS. Patient seen and examined this AM.    MEDICATIONS  (STANDING):  cefTRIAXone   IVPB 1000 milliGRAM(s) IV Intermittent every 24 hours  folic acid 1 milliGRAM(s) Oral daily  influenza   Vaccine 0.5 milliLiter(s) IntraMuscular once  multivitamin 1 Tablet(s) Oral daily  octreotide  Infusion 50 MICROgram(s)/Hr (10 mL/Hr) IV Continuous <Continuous>  pantoprazole Infusion 8 mG/Hr (10 mL/Hr) IV Continuous <Continuous>  phytonadione   Solution 5 milliGRAM(s) Oral daily  thiamine 100 milliGRAM(s) Oral daily      MEDICATIONS  (PRN):  LORazepam   Injectable 2 milliGRAM(s) IV Push every 1 hour PRN Symptom-triggered: each CIWA -Ar score 8 or GREATER    OBJECTIVE  Vital Signs Last 24 Hrs  T(C): 36.8 (15 Nov 2019 04:58), Max: 37.7 (14 Nov 2019 20:51)  T(F): 98.2 (15 Nov 2019 04:58), Max: 99.8 (14 Nov 2019 20:51)  HR: 79 (15 Nov 2019 04:58) (79 - 91)  BP: 112/69 (15 Nov 2019 04:58) (105/61 - 112/69)  BP(mean): --  RR: 18 (15 Nov 2019 04:58) (17 - 18)  SpO2: 99% (15 Nov 2019 04:58) (97% - 99%)    Daily Height in cm: 167.64 (12 Nov 2019 22:41)      General: well appearing man, well nourished, sleeping in bed comfortably, NAD  HEENT: NC/AT, sclera icterus, moist mucous membranes, supple neck, no tongue fasciculation   Pulm/chest: breathing comfortably on RA, CTA b/l  Cardiac: RRR, s1 and s2 heard, no m/g/r  Abdomen: distended, umbilical hernia which protrudes with coughing, midline surgical scar well heeled without erythema, non tender to palpitation (deep and light) in all four quadrants. ++hepatomegaly from most inferior rib to pelvis, normoactive BS  Extremities: warm and well perfused, no pedal edema, very subtle tremors  Skin: no rashes, no spider angiomas  Neuro: Answers all questions appropriately AAOx3, no focal neurologic deficits    LABS:                                              8.6    13.80 )-----------( 53       ( 14 Nov 2019 06:10 )             25.4       11-14    134<L>  |  99  |  9   ----------------------------<  104<H>  3.7   |  26  |  0.76    Ca    7.9<L>      14 Nov 2019 06:10  Phos  1.9     11-14  Mg     1.8     11-14    TPro  6.0  /  Alb  2.8<L>  /  TBili  1.4<H>  /  DBili  x   /  AST  90<H>  /  ALT  26  /  AlkPhos  90  11-14      LIVER FUNCTIONS - ( 13 Nov 2019 01:00 )  Alb: 2.9 g/dL / Pro: 5.8 g/dL / ALK PHOS: 104 u/L / ALT: 29 u/L / AST: 119 u/L / GGT: x           PT/INR - ( 12 Nov 2019 17:04 )   PT: 19.5 SEC;   INR: 1.73     PTT - ( 12 Nov 2019 17:04 )  PTT:29.0 SEC        Endoscopy    Impression:          - Non-bleeding large (> 5 mm) esophageal varices found                        in lower and mid esophagus. No red lydia sign seen. Not                        likely the source of patient bleeding.                       - Localized area of erythematous and edematous mucosa in                        the lesser curvature with ulceration. Biopsied.                       - Portal hypertensive gastropathy.                       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  Recommendation:      - [facility].                       - Clear liquid diet today and advanced as tolerated.                       - Stop octreotide infusion as bleeding not from varices.                       - Start non selective B-Blocker for large varices as                        primary prophylaxis.                       - Given Vitamin K for coagulopathy and minimal bleeding                        after bipsies.                       - Continue pantoprazole 40 mg IV BID.                       - Consider EUS for further evaluation of duodenal mass.                       - Await pathology results.            Imaging Personally Reviewed:  [ ] YES  [ ] NO    Consultant(s) Notes Reviewed:  [x] YES  [ ] NO  Care Discussed with Consultants/Other Providers [x] YES  [ ] NO

## 2019-11-15 NOTE — DISCHARGE NOTE NURSING/CASE MANAGEMENT/SOCIAL WORK - NSDCPEEMAIL_GEN_ALL_CORE
[facility] for Tobacco Control email [email or web address]

## 2019-11-15 NOTE — MEDICAL STUDENT PROGRESS NOTE(EDUCATION) - SUBJECTIVE AND OBJECTIVE BOX
Patient is a 59y old  Male who presents with a chief complaint of hematemesis (15 Nov 2019 06:49)      SUBJECTIVE / OVERNIGHT EVENTS: Pt reports that he feels well No overnight events. No complaints this AM. Patient denies CP, SOB, fever, chills, abd pain, nausea, vomiting, melena, hematochezia.    MEDICATIONS  (STANDING):  cefTRIAXone   IVPB 1000 milliGRAM(s) IV Intermittent every 24 hours  folic acid 1 milliGRAM(s) Oral daily  influenza   Vaccine 0.5 milliLiter(s) IntraMuscular once  magnesium oxide 400 milliGRAM(s) Oral once  metroNIDAZOLE  IVPB      metroNIDAZOLE  IVPB 500 milliGRAM(s) IV Intermittent every 8 hours  multivitamin 1 Tablet(s) Oral daily  pantoprazole  Injectable 40 milliGRAM(s) IV Push two times a day  potassium phosphate / sodium phosphate powder 1 Packet(s) Oral once  propranolol 20 milliGRAM(s) Oral two times a day  sodium phosphate IVPB 30 milliMole(s) IV Intermittent once  thiamine 100 milliGRAM(s) Oral daily    MEDICATIONS  (PRN):  LORazepam     Tablet 2 milliGRAM(s) Oral every 2 hours PRN Symptom-triggered 2 point increase in CIWA-Ar  LORazepam   Injectable 2 milliGRAM(s) IV Push every 1 hour PRN Symptom-triggered: each CIWA -Ar score 8 or GREATER      T(C): 36.8 (11-15-19 @ 04:58), Max: 37.7 (19 @ 20:51)  HR: 79 (11-15-19 @ 04:58) (79 - 91)  BP: 112/69 (11-15-19 @ 04:58) (105/61 - 112/69)  RR: 18 (11-15-19 @ 04:58) (17 - 18)  SpO2: 99% (11-15-19 @ 04:58) (97% - 99%)    PHYSICAL EXAM  GENERAL: NAD, well-developed  NEURO: AO x3, PERRLA, EOMI  HEAD:  Atraumatic, Normocephalic  EYES: conjunctiva and sclera clear  NECK: Supple, No JVD  CHEST/LUNG: Clear to auscultation bilaterally; No wheezes, rales or rhonchi  HEART: Regular rate and rhythm; No murmurs, rubs, or gallops  ABDOMEN: Soft, Nontender, +distended with hepatomegaly; Bowel sounds present  EXTREMITIES:  2+ Peripheral Pulses, No clubbing, cyanosis, or edema  SKIN: Warm, dry, intact, no rashes or lesions  PSYCH: affect appropriate    LABS:                        8.6    10.73 )-----------( 61       ( 15 Nov 2019 06:30 )             25.4     11-15    132<L>  |  101  |  10  ----------------------------<  109<H>  4.2   |  22  |  0.74    Ca    7.8<L>      15 Nov 2019 06:30  Phos  1.4     11-15  Mg     1.8     11-15    TPro  6.0  /  Alb  2.9<L>  /  TBili  1.2  /  DBili  x   /  AST  82<H>  /  ALT  25  /  AlkPhos  110  11-15    PT/INR - ( 15 Nov 2019 06:30 )   PT: 17.7 SEC;   INR: 1.57          PTT - ( 15 Nov 2019 06:30 )  PTT:28.6 SEC      Urinalysis Basic - ( 2019 19:00 )    Color: YELLOW / Appearance: CLEAR / S.020 / pH: 5.5  Gluc: NEGATIVE / Ketone: SMALL  / Bili: NEGATIVE / Urobili: TRACE   Blood: MODERATE / Protein: NEGATIVE / Nitrite: NEGATIVE   Leuk Esterase: NEGATIVE / RBC: 26-50 / WBC 0-2   Sq Epi: OCC / Non Sq Epi: x / Bacteria: NEGATIVE      I&O's Summary      Jessica Hampton MD  Internal Medicine Resident, PGY1  Pager:

## 2019-11-15 NOTE — PROGRESS NOTE ADULT - PROBLEM SELECTOR PLAN 2
- hemoglobin currently stable at 9.1, s/p 1u pRBCs with appropriate response  - endoscopy demonstrated bleeding peptic ulcer but no bleeding varices  - appreciate GI recommendations, will transfuse if hemoglobin <8 with emergent EGD and restart octreotide at that point  - can trend hemoglobin [id number], unless signs of bleeding

## 2019-11-15 NOTE — MEDICAL STUDENT PROGRESS NOTE(EDUCATION) - NS MD HP STUD ASPLAN PLAN FT
1. Aspiration pneumonia - met sepsis criteria, mild leukocytosis remains (10.7) but otherwise asymptomatic   - D/c home today with Augmentin 875mg PO BID    2. Acute blood loss anemia, secondary to GI bleed, Hb stable 8.6   - D/c home with Protonix 40mg PO BID   - GI recommends output f/u at GI clinic   - Return to ED if hematemesis or other bleeding occurs    3. Esophageal varices   - D/c home with propranolol 20mg PO BID   - Encourage alcohol cessation   - F/u GI outpt    4. Gastric ulcer, bleeding on EGD   - D/c home with Protonix 40mg PO BID    5. Duodenal mass   - F/u outpt for elective EUS    6. Alcohol abuse   - Pt refused services from [facility]   - Encourage alcohol cessation   - Pt is out of window for dangerous withdrawal from EtOH    7. Liver dysfunction   - F/u outpt GI clinic    8. Discharge planning   - F/u outpt resident clinic to establish care with PCP   - Return to ED if bleeding occurs

## 2019-11-15 NOTE — PROGRESS NOTE ADULT - ATTENDING COMMENTS
Patient was seen and examined with Hepatology team on rounds. Agree with above.
Patient was seen and examined with hepatology team on rounds. Agree with above.
58 YO M with alcohol use disorder, who presents with hematemesis, admitted for GI bleed. Pt febrile overnight with cough    Sepsis ( Fever, leukocytosis and tachycardia): due to likely Gram neg PNA in the setting of aspiration, c/w ceftriaxone and Flagyl, transition to po on dc. BCx NGTD    Acute Blood Loss Anemia due to hematemesis ( GIB): s/p EGD which shows esophageal varices and gastric ulcer s/p biopsy. c/w  PPI, Hb stable, f/u GI on dc.    Submucosal  Gastric mass; outpt EUS per GI    Hepatic dysfunction: propanol for esophageal varices, f.u hepatology    Alcohol abuse: d/c CIWA    Coagulapathy in the setting of Hepatic Dysfunction: elevated INr, low PLT and Low Alb suggests poor liver synthetic function,    S[pent 40mins on dc
58 YO M with alcohol use disorder, who presents with hematemesis, admitted for GI bleed.    Acute Blood Loss Anemia due to hematemesis ( GIB): c/w Octreotide and PPI, Trend Hb and f/u GI recs. Given concern for Variceal bleed, Abx for PPx.    Alcohol abuse: c/w symptom triggered CIWA and monitor for withdrawal    Coagulapathy in the setting of Hepatic Dysfunction: eleavted INr, low PLT and Low Alb suggests poor liver synthetic function, No ascites seen. +Hepatomegaly and hepatic steatosis, c/w monitoring. f/U Hepatology recs
60 YO M with alcohol use disorder, who presents with hematemesis, admitted for GI bleed. Pt febrile overnight with cough    Sepsis ( Fever, leukocytosis and tachycardia): due to likely Gram neg PNA in the setting of aspiration, c/w ceftriaxone and Flagyl, f/u Cx and monitor. F/u BCx    Acute Blood Loss Anemia due to hematemesis ( GIB): s/p EGD which shows esophageal varices and gastric ulcer s/p biopsy. c/w  PPI, Trend Hb and f/u GI recs. c/w Abx for PPx in the setting of  Variceal bleed    Submucosal  Gastric mass; f/u GI plan for inpt vs outpt EUS    Hepatic dysfunction: propanol for esophageal varices, f.u hepatology    Alcohol abuse: c/w symptom triggered CIWA and monitor for withdrawal    Coagulapathy in the setting of Hepatic Dysfunction: eleavted INr, low PLT and Low Alb suggests poor liver synthetic function,

## 2019-11-15 NOTE — DISCHARGE NOTE NURSING/CASE MANAGEMENT/SOCIAL WORK - NSDCPEWEB_GEN_ALL_CORE
[facility] for Tobacco Control website --- [email or web address] --- [email or web address]

## 2019-11-15 NOTE — PROGRESS NOTE ADULT - PROBLEM SELECTOR PLAN 6
- pt with 5 shots of etoh per day, last drink was last night  - no history of etoh withdrawal  - monitor on Ativan symptom triggered CIWA for now, no Ativan taper necessary for the moment, CIWA has been consistently 1 (tremors)  - c/w folic acid and thiamine  - [facility]- patient refused any services

## 2019-11-15 NOTE — PROGRESS NOTE ADULT - ASSESSMENT
60 YO M with alcohol use disorder, who presents with hematemesis, admitted for GI bleed, s/p 1unit pRBCs. Endoscopy showed evidence of non bleeding varices and bleeding peptic ulcer. Course complicated by possible sepsis secondary to aspiration pneumonia, now currently hemodynamically stable.

## 2019-11-15 NOTE — PROGRESS NOTE ADULT - PROBLEM SELECTOR PLAN 9
Diet: DASH/TLC diet, aspiration precautions  DVT: no ppx at this point secondary to GIB, low platelets, SCDs  Dispo: to home, no skilled PT needed
Diet: DASH/TLC diet, aspiration precautions  DVT: no ppx at this point secondary to GIB, low platelets, SCDs  Dispo: to home, PT

## 2019-11-15 NOTE — PROGRESS NOTE ADULT - PROBLEM SELECTOR PLAN 10
Transitions of Care Status:  1.  Name of PCP: Patient has no PCP, will set up with resident clinic  2.  PCP Contacted on Admission: [ ] Y    [ ] N    3.  PCP contacted at Discharge: [ ] Y    [ ] N    [ ] N/A  4.  Post-Discharge Appointment Date and Location:  5.  Summary of Handoff given to PCP:
Transitions of Care Status:  1.  Name of PCP: Patient has no PCP, will set up with resident clinic  2.  PCP Contacted on Admission: [ ] Y    [ ] N    3.  PCP contacted at Discharge: [ ] Y    [ ] N    [ ] N/A  4.  Post-Discharge Appointment Date and Location:  5.  Summary of Handoff given to PCP:

## 2019-11-15 NOTE — PROGRESS NOTE ADULT - PROBLEM SELECTOR PLAN 5
- duodenal bulb submucosal mass found on endoscopy  - appreciate GI recommendations  - will f/u outpatient EUS  - tolerating PO diet

## 2019-11-15 NOTE — DISCHARGE NOTE NURSING/CASE MANAGEMENT/SOCIAL WORK - PATIENT PORTAL LINK FT
You can access the FollowMyHealth Patient Portal offered by [facility] by registering at the following website: [email or web address]. By joining [employer]’s FollowMyHealth portal, you will also be able to view your health information using other applications (apps) compatible with our system.

## 2019-11-18 LAB
ANA TITR SER: NEGATIVE — SIGNIFICANT CHANGE UP
BACTERIA BLD CULT: SIGNIFICANT CHANGE UP
BACTERIA BLD CULT: SIGNIFICANT CHANGE UP

## 2019-12-05 ENCOUNTER — LABORATORY RESULT (OUTPATIENT)
Age: 59
End: 2019-12-05

## 2019-12-05 ENCOUNTER — OUTPATIENT (OUTPATIENT)
Dept: OUTPATIENT SERVICES | Facility: HOSPITAL | Age: 59
LOS: 1 days | End: 2019-12-05

## 2019-12-05 ENCOUNTER — APPOINTMENT (OUTPATIENT)
Dept: GASTROENTEROLOGY | Facility: CLINIC | Age: 59
End: 2019-12-05

## 2019-12-05 VITALS
OXYGEN SATURATION: 99 % | HEART RATE: 82 BPM | WEIGHT: 136.5 LBS | DIASTOLIC BLOOD PRESSURE: 80 MMHG | SYSTOLIC BLOOD PRESSURE: 110 MMHG | HEIGHT: 66 IN | BODY MASS INDEX: 21.94 KG/M2

## 2019-12-05 DIAGNOSIS — F10.21 ALCOHOL DEPENDENCE, IN REMISSION: ICD-10-CM

## 2019-12-05 DIAGNOSIS — Z87.19 PERSONAL HISTORY OF OTHER DISEASES OF THE DIGESTIVE SYSTEM: ICD-10-CM

## 2019-12-05 DIAGNOSIS — Z90.49 ACQUIRED ABSENCE OF OTHER SPECIFIED PARTS OF DIGESTIVE TRACT: Chronic | ICD-10-CM

## 2019-12-05 DIAGNOSIS — K74.60 UNSPECIFIED CIRRHOSIS OF LIVER: ICD-10-CM

## 2019-12-05 DIAGNOSIS — Z98.890 OTHER SPECIFIED POSTPROCEDURAL STATES: Chronic | ICD-10-CM

## 2019-12-05 DIAGNOSIS — Z00.00 ENCOUNTER FOR GENERAL ADULT MEDICAL EXAMINATION WITHOUT ABNORMAL FINDINGS: ICD-10-CM

## 2019-12-05 DIAGNOSIS — F17.210 NICOTINE DEPENDENCE, CIGARETTES, UNCOMPLICATED: ICD-10-CM

## 2019-12-05 DIAGNOSIS — Z23 ENCOUNTER FOR IMMUNIZATION: ICD-10-CM

## 2019-12-05 DIAGNOSIS — Z72.89 OTHER PROBLEMS RELATED TO LIFESTYLE: ICD-10-CM

## 2019-12-05 LAB
ALBUMIN SERPL ELPH-MCNC: 3.8 G/DL — SIGNIFICANT CHANGE UP (ref 3.3–5)
ALP SERPL-CCNC: 247 U/L — HIGH (ref 40–120)
ALT FLD-CCNC: 17 U/L — SIGNIFICANT CHANGE UP (ref 4–41)
ANION GAP SERPL CALC-SCNC: 11 MMO/L — SIGNIFICANT CHANGE UP (ref 7–14)
APTT BLD: 33.8 SEC — SIGNIFICANT CHANGE UP (ref 27.5–36.3)
AST SERPL-CCNC: 31 U/L — SIGNIFICANT CHANGE UP (ref 4–40)
BASOPHILS # BLD AUTO: 0.03 K/UL — SIGNIFICANT CHANGE UP (ref 0–0.2)
BASOPHILS NFR BLD AUTO: 0.4 % — SIGNIFICANT CHANGE UP (ref 0–2)
BILIRUB SERPL-MCNC: 0.7 MG/DL — SIGNIFICANT CHANGE UP (ref 0.2–1.2)
BUN SERPL-MCNC: 6 MG/DL — LOW (ref 7–23)
CALCIUM SERPL-MCNC: 9.6 MG/DL — SIGNIFICANT CHANGE UP (ref 8.4–10.5)
CHLORIDE SERPL-SCNC: 95 MMOL/L — LOW (ref 98–107)
CO2 SERPL-SCNC: 28 MMOL/L — SIGNIFICANT CHANGE UP (ref 22–31)
CREAT SERPL-MCNC: 0.65 MG/DL — SIGNIFICANT CHANGE UP (ref 0.5–1.3)
EOSINOPHIL # BLD AUTO: 0.15 K/UL — SIGNIFICANT CHANGE UP (ref 0–0.5)
EOSINOPHIL NFR BLD AUTO: 2.2 % — SIGNIFICANT CHANGE UP (ref 0–6)
GLUCOSE SERPL-MCNC: 433 MG/DL — HIGH (ref 70–99)
HCT VFR BLD CALC: 35.2 % — LOW (ref 39–50)
HGB BLD-MCNC: 11.4 G/DL — LOW (ref 13–17)
IMM GRANULOCYTES NFR BLD AUTO: 0.4 % — SIGNIFICANT CHANGE UP (ref 0–1.5)
INR BLD: 1.36 — HIGH (ref 0.88–1.17)
LYMPHOCYTES # BLD AUTO: 1.98 K/UL — SIGNIFICANT CHANGE UP (ref 1–3.3)
LYMPHOCYTES # BLD AUTO: 28.9 % — SIGNIFICANT CHANGE UP (ref 13–44)
MCHC RBC-ENTMCNC: 32.4 % — SIGNIFICANT CHANGE UP (ref 32–36)
MCHC RBC-ENTMCNC: 33.6 PG — SIGNIFICANT CHANGE UP (ref 27–34)
MCV RBC AUTO: 103.8 FL — HIGH (ref 80–100)
MONOCYTES # BLD AUTO: 0.61 K/UL — SIGNIFICANT CHANGE UP (ref 0–0.9)
MONOCYTES NFR BLD AUTO: 8.9 % — SIGNIFICANT CHANGE UP (ref 2–14)
NEUTROPHILS # BLD AUTO: 4.04 K/UL — SIGNIFICANT CHANGE UP (ref 1.8–7.4)
NEUTROPHILS NFR BLD AUTO: 59.2 % — SIGNIFICANT CHANGE UP (ref 43–77)
NRBC # FLD: 0 K/UL — SIGNIFICANT CHANGE UP (ref 0–0)
PLATELET # BLD AUTO: 120 K/UL — LOW (ref 150–400)
PMV BLD: 11.9 FL — SIGNIFICANT CHANGE UP (ref 7–13)
POTASSIUM SERPL-MCNC: 4.1 MMOL/L — SIGNIFICANT CHANGE UP (ref 3.5–5.3)
POTASSIUM SERPL-SCNC: 4.1 MMOL/L — SIGNIFICANT CHANGE UP (ref 3.5–5.3)
PROT SERPL-MCNC: 8.3 G/DL — SIGNIFICANT CHANGE UP (ref 6–8.3)
PROTHROM AB SERPL-ACNC: 15.7 SEC — HIGH (ref 9.8–13.1)
RBC # BLD: 3.39 M/UL — LOW (ref 4.2–5.8)
RBC # FLD: 14.2 % — SIGNIFICANT CHANGE UP (ref 10.3–14.5)
SODIUM SERPL-SCNC: 134 MMOL/L — LOW (ref 135–145)
WBC # BLD: 6.84 K/UL — SIGNIFICANT CHANGE UP (ref 3.8–10.5)
WBC # FLD AUTO: 6.84 K/UL — SIGNIFICANT CHANGE UP (ref 3.8–10.5)

## 2019-12-05 NOTE — PHYSICAL EXAM
[General Appearance - Well Nourished] : well nourished [General Appearance - Well Developed] : well developed [PERRL With Normal Accommodation] : pupils were equal in size, round, and reactive to light [Sclera] : the sclera and conjunctiva were normal [Extraocular Movements] : extraocular movements were intact [Outer Ear] : the ears and nose were normal in appearance [Hearing Threshold Finger Rub Not Crockett] : hearing was normal [Neck Appearance] : the appearance of the neck was normal [Both Tympanic Membranes Were Examined] : both tympanic membranes were normal [Respiration, Rhythm And Depth] : normal respiratory rhythm and effort [Neck Cervical Mass (___cm)] : no neck mass was observed [Auscultation Breath Sounds / Voice Sounds] : lungs were clear to auscultation bilaterally [Exaggerated Use Of Accessory Muscles For Inspiration] : no accessory muscle use [Heart Sounds] : normal S1 and S2 [Heart Sounds Pericardial Friction Rub] : no pericardial rub [Murmurs] : no murmurs [Abdomen Tenderness] : non-tender [Arterial Pulses Femoral] : femoral pulses were normal without bruits [] : no hepato-splenomegaly [Testes Swelling] : the testicles were not swollen [Testes Mass (___cm)] : there were no testicular masses [Cervical Lymph Nodes Enlarged Posterior Bilaterally] : posterior cervical [Cervical Lymph Nodes Enlarged Anterior Bilaterally] : anterior cervical [Skin Lesions] : no skin lesions [Affect] : the affect was normal [Mood] : the mood was normal [General Appearance - In No Acute Distress] : in no acute distress [General Appearance - Alert] : alert [Oropharynx] : the oropharynx was normal [Heart Rate And Rhythm] : heart rate was normal and rhythm regular [Edema] : there was no peripheral edema [Heart Sounds Gallop] : no gallops [Bowel Sounds] : normal bowel sounds [Abdomen Soft] : soft [No CVA Tenderness] : no ~M costovertebral angle tenderness [No Spinal Tenderness] : no spinal tenderness [Involuntary Movements] : no involuntary movements were seen [Skin Turgor] : normal skin turgor [No Focal Deficits] : no focal deficits [Oriented To Time, Place, And Person] : oriented to person, place, and time [Scleral Icterus] : No Scleral Icterus [Spider Angioma] : No spider angioma(s) were observed [Abdominal  Ascites] : no ascites [Splenomegaly] : no splenomegaly [Liver Palpable ___ Finger Breadths Below Costal Margin] : was not palpable below costal margin [Jaundice] : No jaundice [Asterixis] : no asterixis observed [Depression] : no depression [Hallucinations] : ~T no ~M hallucinations [Internal Hemorrhoid] : no internal hemorrhoids [External Hemorrhoid] : no external hemorrhoids

## 2019-12-05 NOTE — ASSESSMENT
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 \par -Continue with Propronalol 20mg BID and PPI 40 qday, 30 minutes before meals\par \par D/W Dr Khan.

## 2019-12-05 NOTE — HISTORY OF PRESENT ILLNESS
[Needlestick Exposure] : no needlestick exposure [Infected Sexual Partner] : no infected sexual partner [IV Drug Use] : no IV drug use [Tattoo] : no tattoos [Body Piercing] : no body piercing [Hemodialysis] : no hemodialysis [Transfusion before 1992] : no transfusion before 1992 [Transplant before 1992] : no transplant before 1992 [Incarceration] : no incarceration [Alcohol Abuse] : no alcohol abuse [Autoimmune Disorder] : no autoimmune disorder [Household Contact to HBV] : no household contact to HBV [Travel to Endemic Area] : no travel to an endemic area [Occupational Exposure] : no occupational exposure [Cocaine Use] : no cocaine use [de-identified] : A 60yo M with history of newly diagnosed cirrhosis (ROSSI vs alcohol) presents for a post hospital stay follow up to establish care. \par \par Pt was admitted at [facility] 11/12 for hematemesis and dark stools, at that time he had an EGD that showed nonbleeding large (>5mm) esophageal varices in lower and mid esophagus, as well as localized area of erythematous and edematous mucosa in lesser curvature with ulceration, portal hypertensive gastropathy. Pt S/P biopsy of the ulcer revealing superficial fragments of gastric mucosa with erosive gastritis. Pt was started on Propranolo 20mg BID and PPI 40mg qday. The patient denies heartburn, dyspepsia, dysphagia, change in bowel habit, melena, weight loss, anemia or hematochezia, hematemesis. Pt denies family history of gallstones, colorectal cancer, polyps, breast, ovarian cancer. \par \par Pt has been drinking a few glasses on gin for many years but has stopped since being in the hospital. \par \par 11/15:\par WBC 10, Hgb 8.6, PLT 61, INR 1.57, Na 132, Cr 0.74, Tbili 1.2, , AST 82 ALT 25\par Ceruloplasmin, A1AT wnl, GRAHAM negative, Hep A (IgG and IgM) NR, Hep BsAb NR, Hep Bs Ag negative, AMA, LKM wnl, Hep C NR\par 11/12: CT A/P with IV contrast- Liver within normal limits. US- Hepatic stetosis.\par \par

## 2019-12-05 NOTE — END OF VISIT
[>50% of Time Spent on Counseling and Coordination of Care for  ___] : Greater than 50% of the encounter time was spent on counseling and coordination of care for [unfilled] [] : Fellow [Time Spent: ___ minutes] : I have spent [unfilled] minutes of face to face time with the patient

## 2019-12-05 NOTE — REVIEW OF SYSTEMS
[As Noted in HPI] : as noted in HPI [Negative] : Heme/Lymph [Eye Pain] : no eye pain [Dry Eyes] : no dryness of the eyes [Shortness Of Breath] : no shortness of breath [Wheezing] : no wheezing [Abdominal Pain] : no abdominal pain [Cough] : no cough [Constipation] : no constipation [Vomiting] : no vomiting [Diarrhea] : no diarrhea [Heartburn] : no heartburn [Melena] : no melena [Dysuria] : no dysuria [Joint Pain] : no joint pain [Joint Swelling] : no joint swelling [Limb Pain] : no limb pain [Itching] : no itching [Anxiety] : no anxiety [Limb Swelling] : no limb swelling [Depression] : no depression [Proptosis] : no proptosis [Muscle Weakness] : no muscle weakness [Feelings Of Weakness] : no feelings of weakness

## 2019-12-06 LAB — AFP-TM SERPL-MCNC: 12.6 NG/ML — HIGH

## 2019-12-27 ENCOUNTER — APPOINTMENT (OUTPATIENT)
Dept: INTERNAL MEDICINE | Facility: CLINIC | Age: 59
End: 2019-12-27

## 2020-01-06 ENCOUNTER — APPOINTMENT (OUTPATIENT)
Dept: INTERNAL MEDICINE | Facility: CLINIC | Age: 60
End: 2020-01-06

## 2020-01-13 ENCOUNTER — INPATIENT (INPATIENT)
Facility: HOSPITAL | Age: 60
LOS: 2 days | Discharge: HOME | End: 2020-01-16
Attending: INTERNAL MEDICINE | Admitting: INTERNAL MEDICINE
Payer: SELF-PAY

## 2020-01-13 VITALS
SYSTOLIC BLOOD PRESSURE: 151 MMHG | TEMPERATURE: 98 F | RESPIRATION RATE: 17 BRPM | WEIGHT: 179.9 LBS | DIASTOLIC BLOOD PRESSURE: 92 MMHG | HEART RATE: 102 BPM | OXYGEN SATURATION: 100 %

## 2020-01-13 DIAGNOSIS — Z98.890 OTHER SPECIFIED POSTPROCEDURAL STATES: Chronic | ICD-10-CM

## 2020-01-13 DIAGNOSIS — Z90.49 ACQUIRED ABSENCE OF OTHER SPECIFIED PARTS OF DIGESTIVE TRACT: Chronic | ICD-10-CM

## 2020-01-13 DIAGNOSIS — R73.9 HYPERGLYCEMIA, UNSPECIFIED: ICD-10-CM

## 2020-01-13 DIAGNOSIS — R56.9 UNSPECIFIED CONVULSIONS: ICD-10-CM

## 2020-01-13 LAB
ACETONE SERPL-MCNC: NEGATIVE — SIGNIFICANT CHANGE UP
ALBUMIN SERPL ELPH-MCNC: 2.2 G/DL — LOW (ref 3.3–5)
ALBUMIN SERPL ELPH-MCNC: 2.5 G/DL — LOW (ref 3.3–5)
ALP SERPL-CCNC: 345 U/L — HIGH (ref 40–120)
ALP SERPL-CCNC: 360 U/L — HIGH (ref 40–120)
ALT FLD-CCNC: 36 U/L — SIGNIFICANT CHANGE UP (ref 12–78)
ALT FLD-CCNC: 39 U/L — SIGNIFICANT CHANGE UP (ref 12–78)
AMMONIA BLD-MCNC: 60 UMOL/L — HIGH (ref 11–32)
ANION GAP SERPL CALC-SCNC: 12 MMOL/L — SIGNIFICANT CHANGE UP (ref 5–17)
ANION GAP SERPL CALC-SCNC: 12 MMOL/L — SIGNIFICANT CHANGE UP (ref 5–17)
ANION GAP SERPL CALC-SCNC: 8 MMOL/L — SIGNIFICANT CHANGE UP (ref 5–17)
APPEARANCE UR: CLEAR — SIGNIFICANT CHANGE UP
AST SERPL-CCNC: 51 U/L — HIGH (ref 15–37)
AST SERPL-CCNC: 52 U/L — HIGH (ref 15–37)
BASOPHILS # BLD AUTO: 0.02 K/UL — SIGNIFICANT CHANGE UP (ref 0–0.2)
BASOPHILS NFR BLD AUTO: 0.3 % — SIGNIFICANT CHANGE UP (ref 0–2)
BILIRUB SERPL-MCNC: 0.5 MG/DL — SIGNIFICANT CHANGE UP (ref 0.2–1.2)
BILIRUB SERPL-MCNC: 0.6 MG/DL — SIGNIFICANT CHANGE UP (ref 0.2–1.2)
BILIRUB UR-MCNC: NEGATIVE — SIGNIFICANT CHANGE UP
BUN SERPL-MCNC: 10 MG/DL — SIGNIFICANT CHANGE UP (ref 7–23)
BUN SERPL-MCNC: 11 MG/DL — SIGNIFICANT CHANGE UP (ref 7–23)
BUN SERPL-MCNC: 8 MG/DL — SIGNIFICANT CHANGE UP (ref 7–23)
CALCIUM SERPL-MCNC: 7.9 MG/DL — LOW (ref 8.5–10.1)
CALCIUM SERPL-MCNC: 7.9 MG/DL — LOW (ref 8.5–10.1)
CALCIUM SERPL-MCNC: 8.4 MG/DL — LOW (ref 8.5–10.1)
CHLORIDE SERPL-SCNC: 104 MMOL/L — SIGNIFICANT CHANGE UP (ref 96–108)
CHLORIDE SERPL-SCNC: 106 MMOL/L — SIGNIFICANT CHANGE UP (ref 96–108)
CHLORIDE SERPL-SCNC: 95 MMOL/L — LOW (ref 96–108)
CK SERPL-CCNC: 183 U/L — SIGNIFICANT CHANGE UP (ref 26–308)
CO2 SERPL-SCNC: 18 MMOL/L — LOW (ref 22–31)
CO2 SERPL-SCNC: 22 MMOL/L — SIGNIFICANT CHANGE UP (ref 22–31)
CO2 SERPL-SCNC: 24 MMOL/L — SIGNIFICANT CHANGE UP (ref 22–31)
COLOR SPEC: YELLOW — SIGNIFICANT CHANGE UP
CREAT SERPL-MCNC: 0.83 MG/DL — SIGNIFICANT CHANGE UP (ref 0.5–1.3)
CREAT SERPL-MCNC: 0.94 MG/DL — SIGNIFICANT CHANGE UP (ref 0.5–1.3)
CREAT SERPL-MCNC: 1.35 MG/DL — HIGH (ref 0.5–1.3)
DIFF PNL FLD: NEGATIVE — SIGNIFICANT CHANGE UP
EOSINOPHIL # BLD AUTO: 0.08 K/UL — SIGNIFICANT CHANGE UP (ref 0–0.5)
EOSINOPHIL NFR BLD AUTO: 1.2 % — SIGNIFICANT CHANGE UP (ref 0–6)
ETHANOL SERPL-MCNC: <10 MG/DL — SIGNIFICANT CHANGE UP (ref 0–10)
GLUCOSE BLDC GLUCOMTR-MCNC: 285 MG/DL — HIGH (ref 70–99)
GLUCOSE BLDC GLUCOMTR-MCNC: 349 MG/DL — HIGH (ref 70–99)
GLUCOSE BLDC GLUCOMTR-MCNC: 380 MG/DL — HIGH (ref 70–99)
GLUCOSE BLDC GLUCOMTR-MCNC: 392 MG/DL — HIGH (ref 70–99)
GLUCOSE BLDC GLUCOMTR-MCNC: 411 MG/DL — HIGH (ref 70–99)
GLUCOSE BLDC GLUCOMTR-MCNC: 433 MG/DL — HIGH (ref 70–99)
GLUCOSE BLDC GLUCOMTR-MCNC: 534 MG/DL — CRITICAL HIGH (ref 70–99)
GLUCOSE BLDC GLUCOMTR-MCNC: 549 MG/DL — CRITICAL HIGH (ref 70–99)
GLUCOSE BLDC GLUCOMTR-MCNC: 555 MG/DL — CRITICAL HIGH (ref 70–99)
GLUCOSE BLDC GLUCOMTR-MCNC: >600 MG/DL — CRITICAL HIGH (ref 70–99)
GLUCOSE SERPL-MCNC: 271 MG/DL — HIGH (ref 70–99)
GLUCOSE SERPL-MCNC: 452 MG/DL — CRITICAL HIGH (ref 70–99)
GLUCOSE SERPL-MCNC: 709 MG/DL — CRITICAL HIGH (ref 70–99)
GLUCOSE UR QL: 1000 MG/DL
HCT VFR BLD CALC: 30.2 % — LOW (ref 39–50)
HGB BLD-MCNC: 9.8 G/DL — LOW (ref 13–17)
IMM GRANULOCYTES NFR BLD AUTO: 0.2 % — SIGNIFICANT CHANGE UP (ref 0–1.5)
KETONES UR-MCNC: ABNORMAL
LEUKOCYTE ESTERASE UR-ACNC: NEGATIVE — SIGNIFICANT CHANGE UP
LYMPHOCYTES # BLD AUTO: 2.46 K/UL — SIGNIFICANT CHANGE UP (ref 1–3.3)
LYMPHOCYTES # BLD AUTO: 37.9 % — SIGNIFICANT CHANGE UP (ref 13–44)
MAGNESIUM SERPL-MCNC: 1.9 MG/DL — SIGNIFICANT CHANGE UP (ref 1.6–2.6)
MCHC RBC-ENTMCNC: 31.6 PG — SIGNIFICANT CHANGE UP (ref 27–34)
MCHC RBC-ENTMCNC: 32.5 GM/DL — SIGNIFICANT CHANGE UP (ref 32–36)
MCV RBC AUTO: 97.4 FL — SIGNIFICANT CHANGE UP (ref 80–100)
MONOCYTES # BLD AUTO: 0.77 K/UL — SIGNIFICANT CHANGE UP (ref 0–0.9)
MONOCYTES NFR BLD AUTO: 11.9 % — SIGNIFICANT CHANGE UP (ref 2–14)
NEUTROPHILS # BLD AUTO: 3.15 K/UL — SIGNIFICANT CHANGE UP (ref 1.8–7.4)
NEUTROPHILS NFR BLD AUTO: 48.5 % — SIGNIFICANT CHANGE UP (ref 43–77)
NITRITE UR-MCNC: NEGATIVE — SIGNIFICANT CHANGE UP
NRBC # BLD: 0 /100 WBCS — SIGNIFICANT CHANGE UP (ref 0–0)
PH UR: 5 — SIGNIFICANT CHANGE UP (ref 5–8)
PLATELET # BLD AUTO: 94 K/UL — LOW (ref 150–400)
POTASSIUM SERPL-MCNC: 3.4 MMOL/L — LOW (ref 3.5–5.3)
POTASSIUM SERPL-MCNC: 3.9 MMOL/L — SIGNIFICANT CHANGE UP (ref 3.5–5.3)
POTASSIUM SERPL-MCNC: 4.8 MMOL/L — SIGNIFICANT CHANGE UP (ref 3.5–5.3)
POTASSIUM SERPL-SCNC: 3.4 MMOL/L — LOW (ref 3.5–5.3)
POTASSIUM SERPL-SCNC: 3.9 MMOL/L — SIGNIFICANT CHANGE UP (ref 3.5–5.3)
POTASSIUM SERPL-SCNC: 4.8 MMOL/L — SIGNIFICANT CHANGE UP (ref 3.5–5.3)
PROT SERPL-MCNC: 6.6 GM/DL — SIGNIFICANT CHANGE UP (ref 6–8.3)
PROT SERPL-MCNC: 7.1 GM/DL — SIGNIFICANT CHANGE UP (ref 6–8.3)
PROT UR-MCNC: NEGATIVE MG/DL — SIGNIFICANT CHANGE UP
RBC # BLD: 3.1 M/UL — LOW (ref 4.2–5.8)
RBC # FLD: 13.5 % — SIGNIFICANT CHANGE UP (ref 10.3–14.5)
SODIUM SERPL-SCNC: 129 MMOL/L — LOW (ref 135–145)
SODIUM SERPL-SCNC: 134 MMOL/L — LOW (ref 135–145)
SODIUM SERPL-SCNC: 138 MMOL/L — SIGNIFICANT CHANGE UP (ref 135–145)
SP GR SPEC: 1.01 — SIGNIFICANT CHANGE UP (ref 1.01–1.02)
UROBILINOGEN FLD QL: NEGATIVE MG/DL — SIGNIFICANT CHANGE UP
WBC # BLD: 6.49 K/UL — SIGNIFICANT CHANGE UP (ref 3.8–10.5)
WBC # FLD AUTO: 6.49 K/UL — SIGNIFICANT CHANGE UP (ref 3.8–10.5)

## 2020-01-13 PROCEDURE — 99291 CRITICAL CARE FIRST HOUR: CPT

## 2020-01-13 PROCEDURE — 99223 1ST HOSP IP/OBS HIGH 75: CPT | Mod: AI

## 2020-01-13 PROCEDURE — 99053 MED SERV 10PM-8AM 24 HR FAC: CPT

## 2020-01-13 PROCEDURE — 93010 ELECTROCARDIOGRAM REPORT: CPT

## 2020-01-13 PROCEDURE — 12345: CPT | Mod: NC

## 2020-01-13 PROCEDURE — 93970 EXTREMITY STUDY: CPT | Mod: 26

## 2020-01-13 PROCEDURE — 70450 CT HEAD/BRAIN W/O DYE: CPT | Mod: 26

## 2020-01-13 RX ORDER — DEXTROSE 50 % IN WATER 50 %
25 SYRINGE (ML) INTRAVENOUS ONCE
Refills: 0 | Status: DISCONTINUED | OUTPATIENT
Start: 2020-01-13 | End: 2020-01-16

## 2020-01-13 RX ORDER — SODIUM CHLORIDE 9 MG/ML
1000 INJECTION INTRAMUSCULAR; INTRAVENOUS; SUBCUTANEOUS ONCE
Refills: 0 | Status: COMPLETED | OUTPATIENT
Start: 2020-01-13 | End: 2020-01-13

## 2020-01-13 RX ORDER — LEVETIRACETAM 250 MG/1
1000 TABLET, FILM COATED ORAL ONCE
Refills: 0 | Status: COMPLETED | OUTPATIENT
Start: 2020-01-13 | End: 2020-01-13

## 2020-01-13 RX ORDER — INSULIN LISPRO 100/ML
5 VIAL (ML) SUBCUTANEOUS
Refills: 0 | Status: DISCONTINUED | OUTPATIENT
Start: 2020-01-13 | End: 2020-01-16

## 2020-01-13 RX ORDER — DEXTROSE 50 % IN WATER 50 %
12.5 SYRINGE (ML) INTRAVENOUS ONCE
Refills: 0 | Status: DISCONTINUED | OUTPATIENT
Start: 2020-01-13 | End: 2020-01-16

## 2020-01-13 RX ORDER — LACTULOSE 10 G/15ML
200 SOLUTION ORAL ONCE
Refills: 0 | Status: COMPLETED | OUTPATIENT
Start: 2020-01-13 | End: 2020-01-13

## 2020-01-13 RX ORDER — THIAMINE MONONITRATE (VIT B1) 100 MG
100 TABLET ORAL DAILY
Refills: 0 | Status: DISCONTINUED | OUTPATIENT
Start: 2020-01-13 | End: 2020-01-16

## 2020-01-13 RX ORDER — INSULIN HUMAN 100 [IU]/ML
10 INJECTION, SOLUTION SUBCUTANEOUS ONCE
Refills: 0 | Status: COMPLETED | OUTPATIENT
Start: 2020-01-13 | End: 2020-01-13

## 2020-01-13 RX ORDER — INSULIN GLARGINE 100 [IU]/ML
10 INJECTION, SOLUTION SUBCUTANEOUS EVERY MORNING
Refills: 0 | Status: DISCONTINUED | OUTPATIENT
Start: 2020-01-13 | End: 2020-01-13

## 2020-01-13 RX ORDER — SODIUM CHLORIDE 9 MG/ML
1000 INJECTION, SOLUTION INTRAVENOUS
Refills: 0 | Status: DISCONTINUED | OUTPATIENT
Start: 2020-01-13 | End: 2020-01-16

## 2020-01-13 RX ORDER — POTASSIUM CHLORIDE 20 MEQ
40 PACKET (EA) ORAL ONCE
Refills: 0 | Status: COMPLETED | OUTPATIENT
Start: 2020-01-13 | End: 2020-01-13

## 2020-01-13 RX ORDER — INSULIN LISPRO 100/ML
VIAL (ML) SUBCUTANEOUS
Refills: 0 | Status: DISCONTINUED | OUTPATIENT
Start: 2020-01-13 | End: 2020-01-16

## 2020-01-13 RX ORDER — ENOXAPARIN SODIUM 100 MG/ML
40 INJECTION SUBCUTANEOUS DAILY
Refills: 0 | Status: DISCONTINUED | OUTPATIENT
Start: 2020-01-13 | End: 2020-01-16

## 2020-01-13 RX ORDER — INSULIN GLARGINE 100 [IU]/ML
15 INJECTION, SOLUTION SUBCUTANEOUS AT BEDTIME
Refills: 0 | Status: DISCONTINUED | OUTPATIENT
Start: 2020-01-13 | End: 2020-01-15

## 2020-01-13 RX ORDER — LEVETIRACETAM 250 MG/1
500 TABLET, FILM COATED ORAL
Refills: 0 | Status: DISCONTINUED | OUTPATIENT
Start: 2020-01-13 | End: 2020-01-16

## 2020-01-13 RX ORDER — GLUCAGON INJECTION, SOLUTION 0.5 MG/.1ML
1 INJECTION, SOLUTION SUBCUTANEOUS ONCE
Refills: 0 | Status: DISCONTINUED | OUTPATIENT
Start: 2020-01-13 | End: 2020-01-16

## 2020-01-13 RX ORDER — INSULIN LISPRO 100/ML
VIAL (ML) SUBCUTANEOUS AT BEDTIME
Refills: 0 | Status: DISCONTINUED | OUTPATIENT
Start: 2020-01-13 | End: 2020-01-16

## 2020-01-13 RX ORDER — PANTOPRAZOLE SODIUM 20 MG/1
40 TABLET, DELAYED RELEASE ORAL
Refills: 0 | Status: DISCONTINUED | OUTPATIENT
Start: 2020-01-13 | End: 2020-01-16

## 2020-01-13 RX ORDER — INSULIN LISPRO 100/ML
12 VIAL (ML) SUBCUTANEOUS ONCE
Refills: 0 | Status: DISCONTINUED | OUTPATIENT
Start: 2020-01-13 | End: 2020-01-13

## 2020-01-13 RX ORDER — SODIUM CHLORIDE 9 MG/ML
2000 INJECTION INTRAMUSCULAR; INTRAVENOUS; SUBCUTANEOUS ONCE
Refills: 0 | Status: COMPLETED | OUTPATIENT
Start: 2020-01-13 | End: 2020-01-13

## 2020-01-13 RX ORDER — INSULIN GLARGINE 100 [IU]/ML
10 INJECTION, SOLUTION SUBCUTANEOUS ONCE
Refills: 0 | Status: DISCONTINUED | OUTPATIENT
Start: 2020-01-13 | End: 2020-01-13

## 2020-01-13 RX ORDER — DEXTROSE 50 % IN WATER 50 %
15 SYRINGE (ML) INTRAVENOUS ONCE
Refills: 0 | Status: DISCONTINUED | OUTPATIENT
Start: 2020-01-13 | End: 2020-01-16

## 2020-01-13 RX ORDER — INSULIN LISPRO 100/ML
6 VIAL (ML) SUBCUTANEOUS ONCE
Refills: 0 | Status: COMPLETED | OUTPATIENT
Start: 2020-01-13 | End: 2020-01-13

## 2020-01-13 RX ORDER — FOLIC ACID 0.8 MG
1 TABLET ORAL DAILY
Refills: 0 | Status: DISCONTINUED | OUTPATIENT
Start: 2020-01-13 | End: 2020-01-16

## 2020-01-13 RX ADMIN — Medication 2 MILLIGRAM(S): at 23:08

## 2020-01-13 RX ADMIN — Medication 8: at 11:23

## 2020-01-13 RX ADMIN — Medication 10: at 17:02

## 2020-01-13 RX ADMIN — SODIUM CHLORIDE 1000 MILLILITER(S): 9 INJECTION INTRAMUSCULAR; INTRAVENOUS; SUBCUTANEOUS at 05:15

## 2020-01-13 RX ADMIN — Medication 100 MILLIGRAM(S): at 17:04

## 2020-01-13 RX ADMIN — ENOXAPARIN SODIUM 40 MILLIGRAM(S): 100 INJECTION SUBCUTANEOUS at 17:05

## 2020-01-13 RX ADMIN — SODIUM CHLORIDE 1000 MILLILITER(S): 9 INJECTION INTRAMUSCULAR; INTRAVENOUS; SUBCUTANEOUS at 06:17

## 2020-01-13 RX ADMIN — INSULIN HUMAN 10 UNIT(S): 100 INJECTION, SOLUTION SUBCUTANEOUS at 06:44

## 2020-01-13 RX ADMIN — INSULIN HUMAN 10 UNIT(S): 100 INJECTION, SOLUTION SUBCUTANEOUS at 04:37

## 2020-01-13 RX ADMIN — LEVETIRACETAM 400 MILLIGRAM(S): 250 TABLET, FILM COATED ORAL at 06:45

## 2020-01-13 RX ADMIN — LACTULOSE 200 GRAM(S): 10 SOLUTION ORAL at 04:37

## 2020-01-13 RX ADMIN — Medication 40 MILLIEQUIVALENT(S): at 22:16

## 2020-01-13 RX ADMIN — Medication 6 UNIT(S): at 08:46

## 2020-01-13 RX ADMIN — INSULIN GLARGINE 10 UNIT(S): 100 INJECTION, SOLUTION SUBCUTANEOUS at 09:19

## 2020-01-13 RX ADMIN — INSULIN GLARGINE 15 UNIT(S): 100 INJECTION, SOLUTION SUBCUTANEOUS at 22:26

## 2020-01-13 RX ADMIN — Medication 5 UNIT(S): at 17:02

## 2020-01-13 RX ADMIN — SODIUM CHLORIDE 2000 MILLILITER(S): 9 INJECTION INTRAMUSCULAR; INTRAVENOUS; SUBCUTANEOUS at 04:44

## 2020-01-13 RX ADMIN — PANTOPRAZOLE SODIUM 40 MILLIGRAM(S): 20 TABLET, DELAYED RELEASE ORAL at 17:04

## 2020-01-13 RX ADMIN — Medication 2: at 22:20

## 2020-01-13 RX ADMIN — Medication 1 MILLIGRAM(S): at 17:04

## 2020-01-13 RX ADMIN — LEVETIRACETAM 500 MILLIGRAM(S): 250 TABLET, FILM COATED ORAL at 17:04

## 2020-01-13 RX ADMIN — SODIUM CHLORIDE 2000 MILLILITER(S): 9 INJECTION INTRAMUSCULAR; INTRAVENOUS; SUBCUTANEOUS at 03:42

## 2020-01-13 RX ADMIN — Medication 1 TABLET(S): at 17:04

## 2020-01-13 NOTE — CONSULT NOTE ADULT - ASSESSMENT
Assessment:             Recommendation:   Patient more awake and alert- improved from when he came in.   He is protecting his airway   Currently not on any drips  Does not require ICU level of care at this time.   Please reconsult if needed. Assessment:   59 year old man with a past medical history of cirrhosis from ETOH abuse. Brought to the ED after having multiple episodes of seizures.   Initially post ictal and now awake and alert. Found also to be hyperglycemic, but is improving after getting insulin. CT head is negative.   Patient more awake and alert- improved from when he came in. He states he feels better. He is protecting his airway. Currently not on any drips      Recommendation:   Neurology consult   Monitor glucose  Does not require ICU level of care at this time.   Please reconsult if needed. Assessment:   59 year old man with a past medical history of cirrhosis from ETOH abuse. Brought to the ED after having multiple episodes of seizures-?ETOH withdrawal vs metabolic etio..   Initially post ictal and now awake and alert.   +hyperglycemia, elevated ammonia level (pt received Lactulose enema x 1 )    CT head is negative.   Patient more awake and alert- improved from when he came in. He states he feels better. He is protecting his airway. Currently not on any drips    Recommendation:   Neurology consult- ?EEG  Monitor glucose, SSInsulin for now  Monitor Ammonia level - Lactulose prn  Currently stable - does not require ICU at this time.  Please reconsult if needed. Assessment:   59 year old man with a past medical history of cirrhosis from ETOH abuse. Brought to the ED after having multiple episodes of seizures-?ETOH withdrawal vs metabolic etio..   Initially post ictal and now awake and alert.   +hyperglycemia, elevated ammonia level (pt received Lactulose enema x 1 )    CT head is negative.   Pt received a dose of Keppra 500 mg IV in the ED  Patient more awake and alert- improved from when he came in. He states he feels better. He is protecting his airway. Currently not on any drips    Recommendation:   Neurology consult- ?EEG, ?maintenance Keppra  Monitor glucose, SSInsulin for now  Monitor Ammonia level - Lactulose prn  Currently stable - does not require ICU at this time.  Please reconsult if needed.

## 2020-01-13 NOTE — ED ADULT TRIAGE NOTE - CHIEF COMPLAINT QUOTE
CAROL ANN , daughter reported he heard fall in the bathroom , found him on the floor shaking , +LOC FOR ABOUT 10 MINUTES , paramedics arrived on scene , patient awake and alert , talking , assisted to the bathroom , had another seizure  , with positive LOC of about 2 mins , Third witnessed seizure  was inside the ambulance  -lasted about 1 minute . Family denies seizure history . hx cirrhosis of the Liver , arrived Altered mental status CAROL ANN , daughter reported he heard fall in the bathroom , found him on the floor shaking , +LOC FOR ABOUT 10 MINUTES , paramedics arrived on scene , patient awake and alert , talking , assisted to the bathroom , had another seizure  , with positive LOC of about 2 mins , Third witnessed seizure  was inside the ambulance  -lasted about 1 minute . Family denies seizure history . hx cirrhosis of the Liver , arrived Altered mental status.  g 18left  fore arm , Diazepam 5mg ivp given  by EMS . bs - HI/HI for ems

## 2020-01-13 NOTE — H&P ADULT - HISTORY OF PRESENT ILLNESS
Pt is a 58 yo male with history of DM2 (was only on diet control), alcohol abuse (~5 drinks daily of gin in past), alcoholic cirrhosis  w/variceal bleed 2 months ago, who reports is no longer drinking since last admission in nov 2019, and also per daughter but she cant be sure if drinking behind her back, was in bathroom and had a seizure and fell to ground hitting head daughter witnessed the sz.  pt had 2 more seizures after. also in ed hyperglycemic to 700's w/o gap.  pt was evaluated by icu, No fever, chills, sob, cp, palpittions, n/v/d/c no travels or sick contacts. no new meds, no drug

## 2020-01-13 NOTE — H&P ADULT - ASSESSMENT
pt w/hx of eoth abuse in past and diabetes on no meds, w/new onset seizure and marked hyperglycemia w/o gap

## 2020-01-13 NOTE — CHART NOTE - NSCHARTNOTEFT_GEN_A_CORE
Critical Value: Blood glucose 452  POCT Blood Glucose.: 411 mg/dL (13 Jan 2020 07:48)      01-13    134<L>  |  104  |  10  ----------------------------<  452<HH>  3.9   |  18<L>  |  0.94    Ca    7.9<L>      13 Jan 2020 06:40  Mg     1.9     01-13    TPro  6.6  /  Alb  2.2<L>  /  TBili  0.6  /  DBili  x   /  AST  52<H>  /  ALT  36  /  AlkPhos  345<H>  01-13    LIVER FUNCTIONS - ( 13 Jan 2020 06:40 )  Alb: 2.2 g/dL / Pro: 6.6 gm/dL / ALK PHOS: 345 U/L / ALT: 36 U/L / AST: 52 U/L / GGT: x             ACTION:  Sliding scale insulin dose adjusted to moderate dose  will give stat 12 units (according to sliding scale schedule) Critical Value: POCT Blood Glucose.: 411 mg/dL (13 Jan 2020 07:48)    Patient has received 10 units humulin R  IVP X 1 @04:00 (blood glucose 709)   10 units humulin R  IVP X 1  @ 06:44 (blood glucose 452)     >600,--555<--433<--411    01-13    134<L>  |  104  |  10  ----------------------------<  452<HH>  3.9   |  18<L>  |  0.94    Ca    7.9<L>      13 Jan 2020 06:40  Mg     1.9     01-13    TPro  6.6  /  Alb  2.2<L>  /  TBili  0.6  /  DBili  x   /  AST  52<H>  /  ALT  36  /  AlkPhos  345<H>  01-13    LIVER FUNCTIONS - ( 13 Jan 2020 06:40 )  Alb: 2.2 g/dL / Pro: 6.6 gm/dL / ALK PHOS: 345 U/L / ALT: 36 U/L / AST: 52 U/L / GGT: x             ACTION:  Sliding scale insulin dose adjusted to moderate dose  will give stat 6 units now   and continue with scheduled lantus dose  Repeat BMP at 13:00

## 2020-01-13 NOTE — ED ADULT NURSE REASSESSMENT NOTE - NS ED NURSE REASSESS COMMENT FT1
ICU PA at bedside. Pt is alert to name, year and place only, still not aware of situation. Slightly easier to redirect pt, but RN Remains at bedside for monitoring and safety as pt is still restless.

## 2020-01-13 NOTE — H&P ADULT - NSHPSOCIALHISTORY_GEN_ALL_CORE
, lives with wife. Has 5 drinks of gin per day(last drink reports 11/19), active smoker 1ppd, denies illicit drug use.

## 2020-01-13 NOTE — CONSULT NOTE ADULT - SUBJECTIVE AND OBJECTIVE BOX
in progress 59 year old man with a past medical history of cirrhosis from etoh abuse, last drink per wife in October who presents to the ED with after having multiple seizures. He was walking to restroom and daughter heard him fall and saw him seizing. She called EMS, and seizure resolved. While assisted to the bathroom, he had another seizure. The third witnessed seizure was in the ambulance and he was given 5 of IV valium. No hx of seizure in the past. Pt is still confused per family. No fevers, no complaints of any pain prior to this episode. In the ED, patient was post ictal and unable to answer questions at the time consult was called.  CT of the head reported no acute intracranial hemorrhage, large cortical infarct or mass effect. Labs showed fingerstick of > 600, ammonia of 60. Patient was given lactulose for elevated ammonia level and insulin 10 units for hyperglycemia. Patient had multiple bowel movements in the ED.   Upon re evaluation around 5:50 am, patient is more alert and awake. Alert and oriented X 2. He is speaking and able to protect his airway. Patient states he feels better.   Denies any pain, no chest pain, abdominal pain, headache or shortness of breath. No signs of DT's.         Vitals:   HR: 102, BP: 140/91  RR: 16, Sat: 96 % on room air         Lab Results:  CBC  CBC Full  -  ( 13 Jan 2020 03:45 )  WBC Count : 6.49 K/uL  RBC Count : 3.10 M/uL  Hemoglobin : 9.8 g/dL  Hematocrit : 30.2 %  Platelet Count - Automated : 94 K/uL  Mean Cell Volume : 97.4 fl  Mean Cell Hemoglobin : 31.6 pg  Mean Cell Hemoglobin Concentration : 32.5 gm/dL  Auto Neutrophil # : 3.15 K/uL  Auto Lymphocyte # : 2.46 K/uL  Auto Monocyte # : 0.77 K/uL  Auto Eosinophil # : 0.08 K/uL  Auto Basophil # : 0.02 K/uL  Auto Neutrophil % : 48.5 %  Auto Lymphocyte % : 37.9 %  Auto Monocyte % : 11.9 %  Auto Eosinophil % : 1.2 %  Auto Basophil % : 0.3 %    .		Differential:	[] Automated		[] Manual  Chemistry                        9.8    6.49  )-----------( 94       ( 13 Jan 2020 03:45 )             30.2     01-13    129<L>  |  95<L>  |  11  ----------------------------<  709<HH>  4.8   |  22  |  1.35<H>    Ca    8.4<L>      13 Jan 2020 03:45  Mg     1.9     01-13    TPro  7.1  /  Alb  2.5<L>  /  TBili  0.5  /  DBili  x   /  AST  51<H>  /  ALT  39  /  AlkPhos  360<H>  01-13    LIVER FUNCTIONS - ( 13 Jan 2020 03:45 )  Alb: 2.5 g/dL / Pro: 7.1 gm/dL / ALK PHOS: 360 U/L / ALT: 39 U/L / AST: 51 U/L / GGT: x

## 2020-01-13 NOTE — ED PROVIDER NOTE - CLINICAL SUMMARY MEDICAL DECISION MAKING FREE TEXT BOX
Ddx: alcohol withdrawal seizure/ new onset seizure/ metabolic encephalopathy  Plan: Ct head, cbc, cmp, fluids, ua, neuro consult, icu consult, reassess

## 2020-01-13 NOTE — ED PROVIDER NOTE - OBJECTIVE STATEMENT
Pt is a 58 yo gentleman with a pmhx of cirrhosis from etoh abuse, last drink per wife in October who presents to the ED with seizure. He was walking to restroom, and daughter heard him fall Pt is a 58 yo gentleman with a pmhx of cirrhosis from etoh abuse, last drink per wife in October who presents to the ED with seizure. He was walking to restroom, and daughter heard him fall and saw him seizing. She called EMS, and seizure resolved. He went back to the bathroom and had another seizure. Had a third seizure, and given 5 of IV valium. No hx of seizure in the past. Pt is still confused per family. No fevers, no complaints of any pain prior to this episode.

## 2020-01-13 NOTE — H&P ADULT - NSHPLABSRESULTS_GEN_ALL_CORE
LABS:                        9.8    6.49  )-----------( 94       ( 2020 03:45 )             30.2     13    129<L>  |  95<L>  |  11  ----------------------------<  709<HH>  4.8   |  22  |  1.35<H>    Ca    8.4<L>      2020 03:45  Mg     1.9         TPro  7.1  /  Alb  2.5<L>  /  TBili  0.5  /  DBili  x   /  AST  51<H>  /  ALT  39  /  AlkPhos  360<H>        Urinalysis Basic - ( 2020 05:45 )    Color: Yellow / Appearance: Clear / S.010 / pH: x  Gluc: x / Ketone: Trace  / Bili: Negative / Urobili: Negative mg/dL   Blood: x / Protein: Negative mg/dL / Nitrite: Negative   Leuk Esterase: Negative / RBC: x / WBC x   Sq Epi: x / Non Sq Epi: x / Bacteria: x      CAPILLARY BLOOD GLUCOSE      POCT Blood Glucose.: 433 mg/dL (2020 06:14)  POCT Blood Glucose.: 555 mg/dL (2020 05:14)  POCT Blood Glucose.: 534 mg/dL (2020 05:13)  POCT Blood Glucose.: 549 mg/dL (2020 04:37)  POCT Blood Glucose.: >600 mg/dL (2020 04:36)  POCT Blood Glucose.: >600 mg/dL (2020 03:40)  POCT Blood Glucose.: >600 mg/dL (2020 03:39)        RADIOLOGY & ADDITIONAL TESTS:    Imaging Personally Reviewed:  [ X] YES  [ ] NO

## 2020-01-13 NOTE — ED PROVIDER NOTE - ENMT, MLM
Airway patent, Nasal mucosa clear. Mouth with normal mucosa. Throat has no vesicles, no oropharyngeal exudates and uvula is midline. Airway is protected.

## 2020-01-13 NOTE — ED ADULT NURSE NOTE - OBJECTIVE STATEMENT
Nutrition Note: 19    Reviewed food frequency questionnaire responses and score with patient.  Food frequency questionnaire score is 50 of 72; indicatin-57- There are some ways you can make your eating habits healthier    Components of Heart Healthy eating discussed  Brief diet recall complete. She tries to pick healthy foods each day - includes a variety of items. Her blood glucose levels have been in her desired range.  She states she does not have any specific nutrition questions or concerns at this time.    Plan:  Continue to choose a variety of foods each day  Maintain desirable blood glucose levels  Contact dietitian with any nutrition questions or concerns.    15 minutes of nutrition discussion complete       Pt presented to ED room 2 AMS, sleepy, difficult to rouse, on 12 lpm via NRB and 1liter NS infusing through left hand 18 ga, both placed by EMS. Pt had three seizures prior to arrival and was given 5mg Valium IVP by paramedics. No Hx of seizures, questionable hx of drinking EtOH. No signs of trauma noted. +urinary and fecal incontinence noted. fingerstick as per EMS was hi/hi. Fingerstick performed here was hi/hi. Second 18 ga placed in right ac, labs were drawn and sent and two liters of fluid were given and pt was brought emergently to head CT by EDT and RN. Pt was RSR/borderline sinus tach in low 100s throughout. Pt maintained O2 sat without need for supplemental oxygen. respirations even and unlabored.. skin warm and dry. Pt continuously observed by RN for agitation. Pt able to be redirected for short periods of time. 30mm condom catheter applied and approx 300cc of clear yellow urine drained. Catheter was promptly removed. All meds were given as per orders, see eMAR. Fingersticks and vitals monitored. awaiting dispo decision. Pt's wife and daughter present at bedside and are aware of plan for admission. All questions answered Pt presented to ED room 2 AMS, sleepy, difficult to rouse, on 12 lpm via NRB and 1liter NS infusing through left hand 18 ga, both placed by EMS. Pt had three seizures prior to arrival and was given 5mg Valium IVP by paramedics. No Hx of seizures, questionable hx of drinking EtOH. No signs of trauma noted. +urinary and fecal incontinence noted. fingerstick as per EMS was hi/hi. Fingerstick performed here was hi/hi. Second 18 ga placed in right ac, labs were drawn and sent and two liters of fluid were given and pt was brought emergently to head CT by EDT and RN. Pt was RSR/borderline sinus tach in low 100s throughout. Pt maintained O2 sat without need for supplemental oxygen. respirations even and unlabored.. skin warm and dry. Pt continuously observed by RN for agitation. Pt able to be redirected for short periods of time. 30mm condom catheter applied and approx 250cc of clear yellow urine drained. Catheter was promptly removed. All meds were given as per orders, see eMAR. Fingersticks and vitals monitored. awaiting dispo decision. Pt's wife and daughter present at bedside and are aware of plan for admission. All questions answered

## 2020-01-13 NOTE — H&P ADULT - NSHPPHYSICALEXAM_GEN_ALL_CORE
Vital Signs Last 24 Hrs  T(C): 37.2 (13 Jan 2020 04:17), Max: 37.2 (13 Jan 2020 04:17)  T(F): 98.9 (13 Jan 2020 04:17), Max: 98.9 (13 Jan 2020 04:17)  HR: 103 (13 Jan 2020 06:06) (102 - 105)  BP: 140/91 (13 Jan 2020 06:06) (120/79 - 151/92)  BP(mean): --  RR: 15 (13 Jan 2020 06:06) (14 - 17)  SpO2: 96% (13 Jan 2020 06:06) (96% - 100%)    PHYSICAL EXAM:    GENERAL: NAD, thin, unkempt male  HEAD:  Normocephalic, small bruising on forehead on left  EYES: EOMI, PERRLA, conjunctiva and sclera clear  ENMT: No tonsillar erythema, exudates, or enlargement; Moist mucous membranes, No lesions  NECK: Supple, No JVD, Normal thyroid  NERVOUS SYSTEM:  Alert & Oriented [id number] Strength 5/5 B/L upper and lower extremities, not tremulous  CHEST/LUNG: Clear to percussion bilaterally; No rales, rhonchi, wheezing, or rubs  HEART: Regular rate and rhythm; No rubs, or gallops, +S1,S2  ABDOMEN: Bowel sounds present, +tense ascites, +ventral hernia  EXTREMITIES:  2+ Peripheral Pulses, No clubbing, cyanosis, trace pedal edema >on left  LYMPH: No cervical adenopathy  RECTAL: deferred  BREAST: No palpatble masses, skin no lesions   : deferred  SKIN: No rashes or lesions    IMPROVE VTE Individual Risk Assessment        RISK                                                          Points  [  ] Previous VTE                                                3  [  ] Thrombophilia                                             2  [  ] Lower limb paralysis                                    2        (unable to hold up >15 seconds)    [  ] Current Cancer                                             2         (within 6 months)  [  x] Immobilization > 24 hrs                              1  [  ] ICU/CCU stay > 24 hours                            1  [ x ] Age > 60                                                    1  IMPROVE VTE Score __2_______ Vital Signs Last 24 Hrs  T(C): 37.2 (13 Jan 2020 04:17), Max: 37.2 (13 Jan 2020 04:17)  T(F): 98.9 (13 Jan 2020 04:17), Max: 98.9 (13 Jan 2020 04:17)  HR: 103 (13 Jan 2020 06:06) (102 - 105)  BP: 140/91 (13 Jan 2020 06:06) (120/79 - 151/92)  BP(mean): --  RR: 15 (13 Jan 2020 06:06) (14 - 17)  SpO2: 96% (13 Jan 2020 06:06) (96% - 100%)    PHYSICAL EXAM:    GENERAL: NAD, thin, unkempt male  HEAD:  Normocephalic, small bruising on forehead on left  EYES: EOMI, PERRLA, conjunctiva and sclera clear  ENMT: No tonsillar erythema, exudates, or enlargement; Moist mucous membranes, No lesions  NECK: Supple, No JVD, Normal thyroid  NERVOUS SYSTEM:  Alert & Oriented X2, Motor Strength 5/5 B/L upper and lower extremities, not tremulous  CHEST/LUNG: Clear to percussion bilaterally; No rales, rhonchi, wheezing, or rubs  HEART: Regular rate and rhythm; No rubs, or gallops, +S1,S2  ABDOMEN: Bowel sounds present, +tense ascites, +ventral hernia  EXTREMITIES:  2+ Peripheral Pulses, No clubbing, cyanosis, trace pedal edema >on left  LYMPH: No cervical adenopathy  RECTAL: deferred  BREAST: No palpatble masses, skin no lesions   : deferred  SKIN: No rashes or lesions    IMPROVE VTE Individual Risk Assessment        RISK                                                          Points  [  ] Previous VTE                                                3  [  ] Thrombophilia                                             2  [  ] Lower limb paralysis                                    2        (unable to hold up >15 seconds)    [  ] Current Cancer                                             2         (within 6 months)  [  x] Immobilization > 24 hrs                              1  [  ] ICU/CCU stay > 24 hours                            1  [ x ] Age > 60                                                    1  IMPROVE VTE Score __2_______

## 2020-01-13 NOTE — ED ADULT NURSE NOTE - ED STAT RN HANDOFF DETAILS
Pt endorsed to day RN Gerardo at bedside. Daughter and pt aware of personnel change. Dr. Lewis at bedside. Pt is more alert and able to follow commands.

## 2020-01-13 NOTE — ED ADULT NURSE NOTE - NSIMPLEMENTINTERV_GEN_ALL_ED
Implemented All Fall Risk Interventions:  [place] to call system. Call bell, personal items and telephone within reach. Instruct patient to call for assistance. Room bathroom lighting operational. Non-slip footwear when patient is off stretcher. Physically safe environment: no spills, clutter or unnecessary equipment. Stretcher in lowest position, wheels locked, appropriate side rails in place. Provide visual cue, wrist band, yellow gown, etc. Monitor gait and stability. Monitor for mental status changes and reorient to person, place, and time. Review medications for side effects contributing to fall risk. Reinforce activity limits and safety measures with patient and family.

## 2020-01-13 NOTE — H&P ADULT - PROBLEM SELECTOR PLAN 7
likely multifactorial w/cirrhosis  given pt is high risk for vte will still use heparin for prophylaxis

## 2020-01-13 NOTE — ED ADULT NURSE NOTE - CHIEF COMPLAINT QUOTE
CAROL ANN , daughter reported he heard fall in the bathroom , found him on the floor shaking , +LOC FOR ABOUT 10 MINUTES , paramedics arrived on scene , patient awake and alert , talking , assisted to the bathroom , had another seizure  , with positive LOC of about 2 mins , Third witnessed seizure  was inside the ambulance  -lasted about 1 minute . Family denies seizure history . hx cirrhosis of the Liver , arrived Altered mental status.  g 18left  fore arm , Diazepam 5mg ivp given  by EMS . bs - HI/HI for ems

## 2020-01-13 NOTE — H&P ADULT - PROBLEM SELECTOR PLAN 1
admit to tele  unclear if etoh withdrawal sz, will put pt on prn ciwa and monitor for withdrawals, currently pt is not tremulous but had received benzos earlier for sz  [facility]  [facility]  [facility]uro consult

## 2020-01-14 DIAGNOSIS — E83.39 OTHER DISORDERS OF PHOSPHORUS METABOLISM: ICD-10-CM

## 2020-01-14 LAB
ANION GAP SERPL CALC-SCNC: 9 MMOL/L — SIGNIFICANT CHANGE UP (ref 5–17)
BUN SERPL-MCNC: 8 MG/DL — SIGNIFICANT CHANGE UP (ref 7–23)
CALCIUM SERPL-MCNC: 8 MG/DL — LOW (ref 8.5–10.1)
CHLORIDE SERPL-SCNC: 106 MMOL/L — SIGNIFICANT CHANGE UP (ref 96–108)
CO2 SERPL-SCNC: 23 MMOL/L — SIGNIFICANT CHANGE UP (ref 22–31)
CREAT SERPL-MCNC: 0.62 MG/DL — SIGNIFICANT CHANGE UP (ref 0.5–1.3)
CULTURE RESULTS: SIGNIFICANT CHANGE UP
GLUCOSE BLDC GLUCOMTR-MCNC: 163 MG/DL — HIGH (ref 70–99)
GLUCOSE BLDC GLUCOMTR-MCNC: 176 MG/DL — HIGH (ref 70–99)
GLUCOSE BLDC GLUCOMTR-MCNC: 237 MG/DL — HIGH (ref 70–99)
GLUCOSE BLDC GLUCOMTR-MCNC: 238 MG/DL — HIGH (ref 70–99)
GLUCOSE BLDC GLUCOMTR-MCNC: 260 MG/DL — HIGH (ref 70–99)
GLUCOSE BLDC GLUCOMTR-MCNC: 328 MG/DL — HIGH (ref 70–99)
GLUCOSE SERPL-MCNC: 208 MG/DL — HIGH (ref 70–99)
HBA1C BLD-MCNC: >15.5 % — HIGH (ref 4–5.6)
HCT VFR BLD CALC: 30.3 % — LOW (ref 39–50)
HGB BLD-MCNC: 9.9 G/DL — LOW (ref 13–17)
MAGNESIUM SERPL-MCNC: 1.6 MG/DL — SIGNIFICANT CHANGE UP (ref 1.6–2.6)
MCHC RBC-ENTMCNC: 31.1 PG — SIGNIFICANT CHANGE UP (ref 27–34)
MCHC RBC-ENTMCNC: 32.7 GM/DL — SIGNIFICANT CHANGE UP (ref 32–36)
MCV RBC AUTO: 95.3 FL — SIGNIFICANT CHANGE UP (ref 80–100)
NRBC # BLD: 0 /100 WBCS — SIGNIFICANT CHANGE UP (ref 0–0)
PHOSPHATE SERPL-MCNC: 1.6 MG/DL — LOW (ref 2.5–4.5)
PLATELET # BLD AUTO: 86 K/UL — LOW (ref 150–400)
POTASSIUM SERPL-MCNC: 3.7 MMOL/L — SIGNIFICANT CHANGE UP (ref 3.5–5.3)
POTASSIUM SERPL-SCNC: 3.7 MMOL/L — SIGNIFICANT CHANGE UP (ref 3.5–5.3)
RBC # BLD: 3.18 M/UL — LOW (ref 4.2–5.8)
RBC # FLD: 13.4 % — SIGNIFICANT CHANGE UP (ref 10.3–14.5)
SODIUM SERPL-SCNC: 138 MMOL/L — SIGNIFICANT CHANGE UP (ref 135–145)
SPECIMEN SOURCE: SIGNIFICANT CHANGE UP
WBC # BLD: 5.81 K/UL — SIGNIFICANT CHANGE UP (ref 3.8–10.5)
WBC # FLD AUTO: 5.81 K/UL — SIGNIFICANT CHANGE UP (ref 3.8–10.5)

## 2020-01-14 PROCEDURE — 99233 SBSQ HOSP IP/OBS HIGH 50: CPT

## 2020-01-14 PROCEDURE — 70551 MRI BRAIN STEM W/O DYE: CPT | Mod: 26

## 2020-01-14 RX ORDER — SODIUM,POTASSIUM PHOSPHATES 278-250MG
1 POWDER IN PACKET (EA) ORAL THREE TIMES A DAY
Refills: 0 | Status: COMPLETED | OUTPATIENT
Start: 2020-01-14 | End: 2020-01-16

## 2020-01-14 RX ORDER — SODIUM,POTASSIUM PHOSPHATES 278-250MG
1 POWDER IN PACKET (EA) ORAL THREE TIMES A DAY
Refills: 0 | Status: DISCONTINUED | OUTPATIENT
Start: 2020-01-14 | End: 2020-01-14

## 2020-01-14 RX ADMIN — Medication 1 TABLET(S): at 11:34

## 2020-01-14 RX ADMIN — Medication 1 MILLIGRAM(S): at 11:33

## 2020-01-14 RX ADMIN — PANTOPRAZOLE SODIUM 40 MILLIGRAM(S): 20 TABLET, DELAYED RELEASE ORAL at 06:15

## 2020-01-14 RX ADMIN — Medication 2: at 12:31

## 2020-01-14 RX ADMIN — Medication 6: at 18:01

## 2020-01-14 RX ADMIN — Medication 1 PACKET(S): at 13:45

## 2020-01-14 RX ADMIN — Medication 5 UNIT(S): at 12:31

## 2020-01-14 RX ADMIN — Medication 100 MILLIGRAM(S): at 11:33

## 2020-01-14 RX ADMIN — LEVETIRACETAM 500 MILLIGRAM(S): 250 TABLET, FILM COATED ORAL at 18:02

## 2020-01-14 RX ADMIN — Medication 4: at 21:37

## 2020-01-14 RX ADMIN — Medication 1 PACKET(S): at 21:37

## 2020-01-14 RX ADMIN — INSULIN GLARGINE 15 UNIT(S): 100 INJECTION, SOLUTION SUBCUTANEOUS at 21:37

## 2020-01-14 RX ADMIN — Medication 5 UNIT(S): at 08:23

## 2020-01-14 RX ADMIN — Medication 2 MILLIGRAM(S): at 03:42

## 2020-01-14 RX ADMIN — Medication 2 MILLIGRAM(S): at 04:45

## 2020-01-14 RX ADMIN — Medication 5 UNIT(S): at 18:02

## 2020-01-14 RX ADMIN — ENOXAPARIN SODIUM 40 MILLIGRAM(S): 100 INJECTION SUBCUTANEOUS at 11:33

## 2020-01-14 RX ADMIN — LEVETIRACETAM 500 MILLIGRAM(S): 250 TABLET, FILM COATED ORAL at 06:15

## 2020-01-14 RX ADMIN — Medication 4: at 08:24

## 2020-01-14 NOTE — PROGRESS NOTE ADULT - PROBLEM SELECTOR PLAN 2
still uncontrolled. increase lantus. cont premeal humalog. cont correctional insulin. follow finger sticks.

## 2020-01-14 NOTE — CONSULT NOTE ADULT - ASSESSMENT
Subjective Complaints:      Consult requested by ER doctor:                  Attending:     History of Present Illness:  Chief Complaint/Reason for Admission:  History of Present Illness:  HPI:  Pt is a 58 yo male with history of DM2 (was only on diet control), alcohol abuse (~5 drinks daily of gin in past), alcoholic cirrhosis  w/variceal bleed 2 months ago, who reports is no longer drinking since last admission in 2019, and also per daughter but she cant be sure if drinking behind her back, was in bathroom and had a seizure and fell to ground hitting head daughter witnessed the sz.  pt had 2 more seizures after. also in ed hyperglycemic to 700's w/o gap.  pt was evaluated by icu, No fever, chills, sob, cp, palpittions, n/v/d/c no travels or sick contacts. no new meds, no drug (2020 07:20)        PAST MEDICAL & SURGICAL HISTORY:  Alcohol abuse  S/P appendectomy  H/O exploratory laparotomy  59yMale    MEDICATIONS  (STANDING):  dextrose 5%. 1000 milliLiter(s) (50 mL/Hr) IV Continuous <Continuous>  dextrose 50% Injectable 12.5 Gram(s) IV Push once  dextrose 50% Injectable 25 Gram(s) IV Push once  dextrose 50% Injectable 25 Gram(s) IV Push once  enoxaparin Injectable 40 milliGRAM(s) SubCutaneous daily  folic acid 1 milliGRAM(s) Oral daily  insulin glargine Injectable (LANTUS) 15 Unit(s) SubCutaneous at bedtime  insulin lispro (HumaLOG) corrective regimen sliding scale   SubCutaneous three times a day before meals  insulin lispro (HumaLOG) corrective regimen sliding scale   SubCutaneous at bedtime  insulin lispro Injectable (HumaLOG) 5 Unit(s) SubCutaneous three times a day with meals  levETIRAcetam 500 milliGRAM(s) Oral two times a day  multivitamin 1 Tablet(s) Oral daily  pantoprazole    Tablet 40 milliGRAM(s) Oral before breakfast  potassium phosphate / sodium phosphate powder 1 Packet(s) Oral three times a day  propranolol 20 milliGRAM(s) Oral two times a day  thiamine 100 milliGRAM(s) Oral daily    MEDICATIONS  (PRN):  dextrose 40% Gel 15 Gram(s) Oral once PRN Blood Glucose LESS THAN 70 milliGRAM(s)/deciliter  glucagon  Injectable 1 milliGRAM(s) IntraMuscular once PRN Glucose LESS THAN 70 milligrams/deciliter  LORazepam   Injectable 2 milliGRAM(s) IV Push every 1 hour PRN Symptom-triggered: each CIWA -Ar score 8 or GREATER      Allergies    No Known Allergies    Intolerances      FAMILY HISTORY:  Family history of bone cancer      REVIEW OF SYSTEMS:  General:  No wt loss, fevers, chills, night sweats  Eyes:  Good vision, no reported pain  ENT:  No sore throat, pain, runny nose, dysphagia  CV:  No pain, palpitatioins, hypo/hypertension  Resp:  No dyspnea, cough, tachypnea, wheezing  GI:  No pain, nausea, vomiting, diarrhea, constipatiion  :  No pain, bleeding, incontinence, nocturia  Muscle:  No pain, weakness  Breast:  No pain, abscess, mass, discharge  Neuro:  No weakness, tingling, memory problems  Psych:  No fatigue, insomnia, mood problems, depression  Endocrine:  No polyuria, polydypsia, cold/heat intolerance  Heme:  No petechiae, ecchymosis, easy bruisability  Skin:  No rash, tattoos, scars, edema      Vital Signs Last 24 Hrs  T(C): 36.8 (2020 17:02), Max: 37.1 (2020 04:45)  T(F): 98.2 (2020 17:02), Max: 98.8 (2020 04:45)  HR: 87 (2020 17:02) (81 - 87)  BP: 97/71 (2020 17:02) (97/71 - 114/73)  BP(mean): --  RR: 17 (2020 17:02) (16 - 18)  SpO2: 96% (2020 17:02) (95% - 99%)    GENERAL PHYSICAL EXAM:  General:  Appears stated age, well-groomed, well-nourished, no distress  HEENT:  NC/AT, patent nares w/ pink mucosa, OP clear w/o lesions, PERRL, EOMI, conjunctivae clear, no thyromegaly, nodules, adenopathy, no JVD  Chest:  Full & symmetric excursion, no increased effort, breath sounds clear  Cardiovascular:  Regular rhythm, S1, S2, no murmur/rub/S3/S4, no carotid/femoral/abdominal bruit, radial/pedal pulses 2+, no edema  Abdomen:  Soft, non-tender, non-distended, normoactive bowel sounds, no HSM  Extremities:  Gait & station:   Digits:   Nails:   Joints, Bones, Muscles:   ROM:   Stability:  Skin:  No rash/erythema/ecchymoses/petechiae/wounds/abscess/warm/dry  Musculoskeletal:  Full ROM in all joints w/o swelling/tenderness/effusion    NEUROLOGICAL EXAM:  HENT:  Normocephalic head; atraumatic head.  Neck supple.  ENT: normal looking.  Mental State:    Alert.  Fully oriented to person, place and date.  Coherent.  Speech clear and intact.  Cooperative.  Responds appropriately.    Cranial Nerves:  II-XII:   Pupils round and reactive to light and accommodation.  Extraocular movements full.  Visual fields full (no homonymous hemianopsia).  Visual acuity wnl.  Facial symmetry intact.  Tongue midline.  Motor Functions:  Moves all extremities.  No pronator drift of UE.  Claps hand well.  Hand  intact bilaterally.  Ambulatory.    Sensory Functions:   Intact to touch and pinprick to face and extremities.    Reflexes:  Deep tendon reflexes normoactive to biceps, knees and ankles.  Babinski absent (present).  Cerebellar Testing:    Finger to nose intact.  Nystagmus absent.  Neurovascular: Carotid auscultation full without bruits.      LABS:                        9.9    5.81  )-----------( 86       ( 2020 08:23 )             30.3     -    138  |  106  |  8   ----------------------------<  208<H>  3.7   |  23  |  0.62    Ca    8.0<L>      2020 08:23  Phos  1.6       Mg     1.6         TPro  6.6  /  Alb  2.2<L>  /  TBili  0.6  /  DBili  x   /  AST  52<H>  /  ALT  36  /  AlkPhos  345<H>          Urinalysis Basic - ( 2020 05:45 )    Color: Yellow / Appearance: Clear / S.010 / pH: x  Gluc: x / Ketone: Trace  / Bili: Negative / Urobili: Negative mg/dL   Blood: x / Protein: Negative mg/dL / Nitrite: Negative   Leuk Esterase: Negative / RBC: x / WBC x   Sq Epi: x / Non Sq Epi: x / Bacteria: x        RADIOLOGY & ADDITIONAL STUDIES:      Assessment & Opinion:  events  noted awake alert speech fluent  folows commands no tremors  hx of etoh not drinking now  mri tita noted no acute path  arm leg 4/.5 sesnory intact  no dts  on thiamine  discuss with   family  will folow     Recommendations:  Brain MRI.  .  E  EEG.   DVT prophylaxis as ordered.   tsh   b12   Medications:

## 2020-01-14 NOTE — PROGRESS NOTE ADULT - PROBLEM SELECTOR PLAN 1
no more seizure. likely alcohol withdrawal. cont keppra and ativan.   seizure precautions.   follow MRI brain.   check ammonia for hyperammonemia at admission and also for intermittent confusion   follow neurologist consult recs. consulted Dr Cordero.   neuro checks.   discussed with wife on phone.

## 2020-01-14 NOTE — PROGRESS NOTE ADULT - SUBJECTIVE AND OBJECTIVE BOX
CHIEF COMPLAINT: still confused but more conversant. no report of any chest pain. no sob. no fever no vomiting. no more seizure like activity       PHYSICAL EXAM:  GENERAL: well built, well nourished, no acute distress   CHEST/LUNG: Clear to ausculation bilaterally, no wheezing, no crackles   HEART: Regular rate and rhythm; No murmurs, rubs  ABDOMEN: Soft, Nontender, Nondistended; Bowel sounds present  EXTREMITIES:  Moving all four extremities spontaneously, No clubbing, cyanosis, or edema  NERVOUS SYSTEM:  Grossly non focal.  Psychiatry: AAO x 2. intermittent confusion.     -----------------------------------------------------------------------------------------------------------------------------------------------------------------  OBJECTIVE DATA:     Vital Signs Last 24 Hrs  T(C): 36.2 (2020 11:08), Max: 37.2 (2020 16:39)  T(F): 97.1 (2020 11:08), Max: 99 (2020 16:39)  HR: 81 (2020 11:08) (81 - 97)  BP: 103/70 (2020 11:08) (103/70 - 114/73)  BP(mean): --  RR: 16 (2020 11:08) (16 - 18)  SpO2: 95% (2020 11:08) (95% - 100%)           Daily     Daily Weight in k.5 (2020 04:45)      LABS:                        9.9    5.81  )-----------( 86       ( 2020 08:23 )             30.3             01-14    138  |  106  |  8   ----------------------------<  208<H>  3.7   |  23  |  0.62    Ca    8.0<L>      2020 08:23  Phos  1.6       Mg     1.6         TPro  6.6  /  Alb  2.2<L>  /  TBili  0.6  /  DBili  x   /  AST  52<H>  /  ALT  36  /  AlkPhos  345<H>                  Urinalysis Basic - ( 2020 05:45 )    Color: Yellow / Appearance: Clear / S.010 / pH: x  Gluc: x / Ketone: Trace  / Bili: Negative / Urobili: Negative mg/dL   Blood: x / Protein: Negative mg/dL / Nitrite: Negative   Leuk Esterase: Negative / RBC: x / WBC x   Sq Epi: x / Non Sq Epi: x / Bacteria: x          Culture - Urine  Final Report (20 @ 07:19):    <10,000 CFU/mL Normal Urogenital Tiffanie      MEDICATIONS  (STANDING):  dextrose 5%. 1000 milliLiter(s) (50 mL/Hr) IV Continuous <Continuous>  dextrose 50% Injectable 12.5 Gram(s) IV Push once  dextrose 50% Injectable 25 Gram(s) IV Push once  dextrose 50% Injectable 25 Gram(s) IV Push once  enoxaparin Injectable 40 milliGRAM(s) SubCutaneous daily  folic acid 1 milliGRAM(s) Oral daily  insulin glargine Injectable (LANTUS) 15 Unit(s) SubCutaneous at bedtime  insulin lispro (HumaLOG) corrective regimen sliding scale   SubCutaneous three times a day before meals  insulin lispro (HumaLOG) corrective regimen sliding scale   SubCutaneous at bedtime  insulin lispro Injectable (HumaLOG) 5 Unit(s) SubCutaneous three times a day with meals  levETIRAcetam 500 milliGRAM(s) Oral two times a day  multivitamin 1 Tablet(s) Oral daily  pantoprazole    Tablet 40 milliGRAM(s) Oral before breakfast  potassium phosphate / sodium phosphate powder 1 Packet(s) Oral three times a day  propranolol 20 milliGRAM(s) Oral two times a day  thiamine 100 milliGRAM(s) Oral daily    MEDICATIONS  (PRN):  dextrose 40% Gel 15 Gram(s) Oral once PRN Blood Glucose LESS THAN 70 milliGRAM(s)/deciliter  glucagon  Injectable 1 milliGRAM(s) IntraMuscular once PRN Glucose LESS THAN 70 milligrams/deciliter  LORazepam   Injectable 2 milliGRAM(s) IV Push every 1 hour PRN Symptom-triggered: each CIWA -Ar score 8 or GREATER      ------------------------------------------------------------------------------------------------------------------------------------------------------------------------  DVT Prophylaxis: Anticoagulation  Compression devices

## 2020-01-15 LAB
AMMONIA BLD-MCNC: 73 UMOL/L — HIGH (ref 11–32)
GLUCOSE BLDC GLUCOMTR-MCNC: 235 MG/DL — HIGH (ref 70–99)
GLUCOSE BLDC GLUCOMTR-MCNC: 244 MG/DL — HIGH (ref 70–99)
GLUCOSE BLDC GLUCOMTR-MCNC: 248 MG/DL — HIGH (ref 70–99)
GLUCOSE BLDC GLUCOMTR-MCNC: 365 MG/DL — HIGH (ref 70–99)
PHOSPHATE SERPL-MCNC: 2.7 MG/DL — SIGNIFICANT CHANGE UP (ref 2.5–4.5)
TSH SERPL-MCNC: 1.18 UIU/ML — SIGNIFICANT CHANGE UP (ref 0.36–3.74)
VIT B12 SERPL-MCNC: 1927 PG/ML — HIGH (ref 232–1245)

## 2020-01-15 PROCEDURE — 99232 SBSQ HOSP IP/OBS MODERATE 35: CPT

## 2020-01-15 RX ORDER — METFORMIN HYDROCHLORIDE 850 MG/1
500 TABLET ORAL
Refills: 0 | Status: DISCONTINUED | OUTPATIENT
Start: 2020-01-15 | End: 2020-01-16

## 2020-01-15 RX ORDER — INSULIN GLARGINE 100 [IU]/ML
18 INJECTION, SOLUTION SUBCUTANEOUS AT BEDTIME
Refills: 0 | Status: DISCONTINUED | OUTPATIENT
Start: 2020-01-15 | End: 2020-01-16

## 2020-01-15 RX ADMIN — Medication 5 UNIT(S): at 11:37

## 2020-01-15 RX ADMIN — Medication 5 UNIT(S): at 16:53

## 2020-01-15 RX ADMIN — LEVETIRACETAM 500 MILLIGRAM(S): 250 TABLET, FILM COATED ORAL at 17:05

## 2020-01-15 RX ADMIN — Medication 10: at 11:36

## 2020-01-15 RX ADMIN — Medication 100 MILLIGRAM(S): at 11:29

## 2020-01-15 RX ADMIN — PANTOPRAZOLE SODIUM 40 MILLIGRAM(S): 20 TABLET, DELAYED RELEASE ORAL at 05:26

## 2020-01-15 RX ADMIN — Medication 4: at 08:30

## 2020-01-15 RX ADMIN — Medication 4: at 16:53

## 2020-01-15 RX ADMIN — Medication 1 TABLET(S): at 11:29

## 2020-01-15 RX ADMIN — LEVETIRACETAM 500 MILLIGRAM(S): 250 TABLET, FILM COATED ORAL at 05:23

## 2020-01-15 RX ADMIN — Medication 1 PACKET(S): at 15:43

## 2020-01-15 RX ADMIN — Medication 1 MILLIGRAM(S): at 11:29

## 2020-01-15 RX ADMIN — INSULIN GLARGINE 18 UNIT(S): 100 INJECTION, SOLUTION SUBCUTANEOUS at 21:33

## 2020-01-15 RX ADMIN — Medication 1 PACKET(S): at 05:23

## 2020-01-15 RX ADMIN — ENOXAPARIN SODIUM 40 MILLIGRAM(S): 100 INJECTION SUBCUTANEOUS at 11:29

## 2020-01-15 RX ADMIN — Medication 5 UNIT(S): at 08:31

## 2020-01-15 RX ADMIN — Medication 1 PACKET(S): at 21:33

## 2020-01-15 RX ADMIN — METFORMIN HYDROCHLORIDE 500 MILLIGRAM(S): 850 TABLET ORAL at 17:05

## 2020-01-15 NOTE — PROGRESS NOTE ADULT - SUBJECTIVE AND OBJECTIVE BOX
CHIEF COMPLAINT: Follow up of seizure. no confusion at this time. no fever no n/v/d      PHYSICAL EXAM:  GENERAL: well built, well nourished, no acute distress   CHEST/LUNG: Clear to ausculation bilaterally, no wheezing, no crackles   HEART: Regular rate and rhythm; No murmurs, rubs  ABDOMEN: Soft, Nontender, Nondistended; Bowel sounds present  EXTREMITIES:  Moving all four extremities spontaneously, No clubbing, cyanosis, or edema  NERVOUS SYSTEM:  Grossly non focal.  Psychiatry: AAO x 2. intermittent confusion.     -----------------------------------------------------------------------------------------------------------------------------------------------------------------  OBJECTIVE DATA:       Vital Signs Last 24 Hrs  T(C): 36.1 (15 Stefan 2020 10:45), Max: 36.8 (2020 17:02)  T(F): 97 (15 Stefan 2020 10:45), Max: 98.2 (2020 17:02)  HR: 79 (15 Stefan 2020 10:45) (79 - 90)  BP: 91/57 (15 Stefan 2020 10:45) (91/57 - 113/73)  BP(mean): --  RR: 17 (15 Stefan 2020 10:45) (17 - 19)  SpO2: 97% (15 Stefan 2020 10:45) (96% - 100%)           Daily     Daily Weight in k.5 (15 Stefan 2020 05:13)      LABS:                        9.9    5.81  )-----------( 86       ( 2020 08:23 )             30.3             01-14    138  |  106  |  8   ----------------------------<  208<H>  3.7   |  23  |  0.62    Ca    8.0<L>      2020 08:23  Phos  2.7     01-15  Mg     1.6     01-14                        Interval Radiology studies: reviewed     MEDICATIONS  (STANDING):  dextrose 5%. 1000 milliLiter(s) (50 mL/Hr) IV Continuous <Continuous>  dextrose 50% Injectable 12.5 Gram(s) IV Push once  dextrose 50% Injectable 25 Gram(s) IV Push once  dextrose 50% Injectable 25 Gram(s) IV Push once  enoxaparin Injectable 40 milliGRAM(s) SubCutaneous daily  folic acid 1 milliGRAM(s) Oral daily  insulin glargine Injectable (LANTUS) 15 Unit(s) SubCutaneous at bedtime  insulin lispro (HumaLOG) corrective regimen sliding scale   SubCutaneous three times a day before meals  insulin lispro (HumaLOG) corrective regimen sliding scale   SubCutaneous at bedtime  insulin lispro Injectable (HumaLOG) 5 Unit(s) SubCutaneous three times a day with meals  levETIRAcetam 500 milliGRAM(s) Oral two times a day  multivitamin 1 Tablet(s) Oral daily  pantoprazole    Tablet 40 milliGRAM(s) Oral before breakfast  potassium phosphate / sodium phosphate powder 1 Packet(s) Oral three times a day  propranolol 20 milliGRAM(s) Oral two times a day  thiamine 100 milliGRAM(s) Oral daily    MEDICATIONS  (PRN):  dextrose 40% Gel 15 Gram(s) Oral once PRN Blood Glucose LESS THAN 70 milliGRAM(s)/deciliter  glucagon  Injectable 1 milliGRAM(s) IntraMuscular once PRN Glucose LESS THAN 70 milligrams/deciliter  LORazepam   Injectable 2 milliGRAM(s) IV Push every 1 hour PRN Symptom-triggered: each CIWA -Ar score 8 or GREATER

## 2020-01-15 NOTE — PROGRESS NOTE ADULT - ASSESSMENT
Subjective Complaints:  Historian:             Vital Signs Last 24 Hrs  T(C): 36.4 (15 Stefan 2020 16:58), Max: 36.7 (15 Stefan 2020 05:13)  T(F): 97.6 (15 Stefan 2020 16:58), Max: 98.1 (15 Stefan 2020 05:13)  HR: 83 (15 Stefan 2020 16:58) (79 - 90)  BP: 109/76 (15 Stefan 2020 16:58) (91/57 - 113/73)  BP(mean): --  RR: 17 (15 Stefan 2020 16:58) (17 - 19)  SpO2: 98% (15 Stefan 2020 16:58) (96% - 100%)    GENERAL PHYSICAL EXAM:  General:  Appears stated age, well-groomed, well-nourished, no distress  HEENT:  NC/AT, patent nares w/ pink mucosa, OP clear w/o lesions, PERRL, EOMI, conjunctivae clear, no thyromegaly, nodules, adenopathy, no JVD  Chest:  Full & symmetric excursion, no increased effort, breath sounds clear  Cardiovascular:  Regular rhythm, S1, S2, no murmur/rub/S3/S4, no carotid/femoral/abdominal bruit, radial/pedal pulses 2+, no edema  Abdomen:  Soft, non-tender, non-distended, normoactive bowel sounds, no HSM  Extremities:  Gait & station:   Digits:   Nails:   Joints, Bones, Muscles:   ROM:   Stability:  Skin:  No rash/erythema/ecchymoses/petechiae/wounds/abscess/warm/dry  Musculoskeletal:  Full ROM in all joints w/o swelling/tenderness/effusion        LABS:                        9.9    5.81  )-----------( 86       ( 14 Jan 2020 08:23 )             30.3     01-14    138  |  106  |  8   ----------------------------<  208<H>  3.7   |  23  |  0.62    Ca    8.0<L>      14 Jan 2020 08:23  Phos  2.7     01-15  Mg     1.6     01-14            RADIOLOGY & ADDITIONAL STUDIES:        Neurology Progress Note:      Mental Status: awake alert  speech fluent folows commands       Cranial Nerves: 2 /12intact      Motor:   arm leg 4/5         Sensory:intact      Cerebellar: defrd       gait   no ataxia          s/p seziure hx of etoh no tremor  no  dts  on keppra  on thiamone  will follow in office
pt w/hx of eoth abuse, ? DM type 2, non compliance, alcohol liver disease, w/new onset seizure and marked hyperglycemia w/o gap. started on insulin and IVF. started on ativan and keppra. CT head unremarkable. neurology consulted.
pt w/hx of eoth abuse, ? DM type 2, non compliance, alcohol liver disease, w/new onset seizure and marked hyperglycemia w/o gap. started on insulin and IVF. started on ativan and keppra. CT head unremarkable. neurology consulted. MRI brain also unremarkable for any acute abnormality.     1.	Seizure. alcohol withdrawal vs seizure disorder. (patient denied drinking alcohol for last couple of weeks but reliability?).   work up with CT head, MRI brain negative so far. appreciated neurologist recs. follow EEG. cont ativan and keppra. seizure precautions. repeat ammonia level reviewed and slightly worse but clinically patient is AAO x 3.   2.	chronic alcohol liver disease. reinforced alcohol cessation.   3.	DM type 2. uncontrolled hyperglycemia. add metformin. adjusted insulin. follow finger sticks  4.	Hypophosphatemia. repleted and resolved.     PT eval.   full code.   DVT ppx: lovenox  possible discharge ready tomorrow. discussed with Dr Cordero on phone. He will follow patient again later today.

## 2020-01-16 ENCOUNTER — TRANSCRIPTION ENCOUNTER (OUTPATIENT)
Age: 60
End: 2020-01-16

## 2020-01-16 VITALS — WEIGHT: 143.3 LBS

## 2020-01-16 LAB
GLUCOSE BLDC GLUCOMTR-MCNC: 109 MG/DL — HIGH (ref 70–99)
GLUCOSE BLDC GLUCOMTR-MCNC: 232 MG/DL — HIGH (ref 70–99)
GLUCOSE BLDC GLUCOMTR-MCNC: 304 MG/DL — HIGH (ref 70–99)

## 2020-01-16 PROCEDURE — 99239 HOSP IP/OBS DSCHRG MGMT >30: CPT

## 2020-01-16 RX ORDER — INSULIN GLARGINE 100 [IU]/ML
18 INJECTION, SOLUTION SUBCUTANEOUS
Qty: 6 | Refills: 0
Start: 2020-01-16 | End: 2020-02-14

## 2020-01-16 RX ORDER — METFORMIN HYDROCHLORIDE 850 MG/1
1 TABLET ORAL
Qty: 60 | Refills: 0
Start: 2020-01-16

## 2020-01-16 RX ORDER — INSULIN LISPRO 100/ML
5 VIAL (ML) SUBCUTANEOUS
Qty: 6 | Refills: 0
Start: 2020-01-16

## 2020-01-16 RX ORDER — LEVETIRACETAM 250 MG/1
1 TABLET, FILM COATED ORAL
Qty: 60 | Refills: 0
Start: 2020-01-16

## 2020-01-16 RX ORDER — ISOPROPYL ALCOHOL, BENZOCAINE .7; .06 ML/ML; ML/ML
1 SWAB TOPICAL
Qty: 100 | Refills: 1
Start: 2020-01-16 | End: 2020-03-05

## 2020-01-16 RX ADMIN — Medication 4: at 08:33

## 2020-01-16 RX ADMIN — Medication 100 MILLIGRAM(S): at 11:54

## 2020-01-16 RX ADMIN — Medication 1 MILLIGRAM(S): at 11:59

## 2020-01-16 RX ADMIN — LEVETIRACETAM 500 MILLIGRAM(S): 250 TABLET, FILM COATED ORAL at 06:20

## 2020-01-16 RX ADMIN — METFORMIN HYDROCHLORIDE 500 MILLIGRAM(S): 850 TABLET ORAL at 06:20

## 2020-01-16 RX ADMIN — ENOXAPARIN SODIUM 40 MILLIGRAM(S): 100 INJECTION SUBCUTANEOUS at 11:54

## 2020-01-16 RX ADMIN — Medication 1 PACKET(S): at 06:20

## 2020-01-16 RX ADMIN — Medication 5 UNIT(S): at 11:52

## 2020-01-16 RX ADMIN — PANTOPRAZOLE SODIUM 40 MILLIGRAM(S): 20 TABLET, DELAYED RELEASE ORAL at 06:20

## 2020-01-16 RX ADMIN — Medication 1 TABLET(S): at 11:54

## 2020-01-16 RX ADMIN — Medication 5 UNIT(S): at 08:33

## 2020-01-16 RX ADMIN — Medication 8: at 11:52

## 2020-01-16 NOTE — DIETITIAN INITIAL EVALUATION ADULT. - OTHER INFO
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 Pt with hx chronic alcohol liver disease reports no hx DM; pt admitted with polyuria, polydipsia, fatigue & blurry vision with no weight loss. Pt with good appetite & po intake PTA currently consuming % meals. Pt started on Lantus 18units HA< Humalog 5untis ac meals & Humalog ss & Metformin 500mg 2 x day. Provided diabetic diet instruction & handout material. Pt reports last BM x 1(1/16).   Diet hx; Breakfast; cornflake's, whole milk, OJ & coffee (black)  Lunch; Turkey, roast beef s/w on white bread  Dinner; potato, beef, chicken, vegetable    Provided meal planning with the plate method handout material & ambulatory diabetes wellness information

## 2020-01-16 NOTE — PHYSICAL THERAPY INITIAL EVALUATION ADULT - ADDITIONAL COMMENTS
As per care coordination and corroborated with patient: patient lives in a private house with 4 steps to enter, no steps once inside. Patient denies owning assistive device.

## 2020-01-16 NOTE — DISCHARGE NOTE PROVIDER - HOSPITAL COURSE
Pt w/hx of eoth abuse, ? DM type 2, non compliance, alcohol liver disease, w/new onset seizure and marked hyperglycemia w/o gap. started on insulin and IVF. started on ativan and keppra. CT head unremarkable. neurology consulted. MRI brain also unremarkable for any acute abnormality.         Seizure. alcohol withdrawal vs seizure disorder. (patient denied drinking alcohol for last couple of weeks but reliability?).   work up with CT head, MRI brain negative so far. appreciated neurologist recs. EEG cancelled per his recs. cont  keppra. seizure precautions.     chronic alcohol liver disease. reinforced alcohol cessation.     DM type 2. better controlled. diabetic education provided. wife is nurse and will help with insulin use. meds e-prescribed.     Hypophosphatemia. repleted and resolved.            PHYSICAL EXAM:        GENERAL: well built, well nourished    CHEST/LUNG: Clear to ausculation bilaterally, no wheezing, no crackles     HEART: Regular rate and rhythm; No murmurs, rubs    ABDOMEN: Soft, Nontender, Nondistended; Bowel sounds present    EXTREMITIES:  Moving all four extremities spontaneously, No clubbing, cyanosis, or edema    NERVOUS SYSTEM:  Grossly non focal.    Psychiatry: AAO x 3, mood is appropriate         OBJECTIVE DATA:         Vital Signs Last 24 Hrs    T(C): 37 (2020 06:58), Max: 37 (2020 00:54)    T(F): 98.6 (2020 06:58), Max: 98.6 (2020 00:54)    HR: 82 (2020 06:58) (82 - 90)    BP: 118/75 (2020 06:58) (109/76 - 124/77)    BP(mean): --    RR: 17 (2020 06:58) (17 - 17)    SpO2: 98% (2020 00:54) (98% - 98%)           Daily       Daily Weight in k.7 (2020 06:58)        LABS:                  Phos  2.7     15                                        DC TIME SPENT BY ME EXCLUDING BILLABLE PROCEDURES 38 mins

## 2020-01-16 NOTE — DISCHARGE NOTE PROVIDER - NSDCMRMEDTOKEN_GEN_ALL_CORE_FT
alcohol swabs : Apply topically to affected area 4 times a day   folic acid 1 mg oral tablet: 1 tab(s) orally once a day  glucometer (per patient&#x27;s insurance): Test blood sugars four times a day. Dispense #1 glucometer.  glucose tablets: Follow instructions on bottle when sugar is low.  HumaLOG 100 units/mL injectable solution: 5 unit(s) injectable 3 times a day (before meals)   Insulin Pen Needles, 4mm: 1 application subcutaneously 4 times a day. ** Use with insulin pen **   lancets: 1 application subcutaneously 4 times a day   Lantus 100 units/mL subcutaneous solution: 18 unit(s) subcutaneous once a day (at bedtime)   levETIRAcetam 500 mg oral tablet: 1 tab(s) orally 2 times a day  metFORMIN 500 mg oral tablet: 1 tab(s) orally 2 times a day  Multiple Vitamins oral tablet: 1 tab(s) orally once a day  pantoprazole 40 mg oral delayed release tablet: 1 tab(s) orally once a day, please take in the morning, 30 minutes before breakfast  propranolol 20 mg oral tablet: 1 tab(s) orally 2 times a day   test strips (per patient&#x27;s insurance): 1 application subcutaneously 4 times a day. ** Compatible with patient&#x27;s glucometer **  thiamine 100 mg oral tablet: 1 tab(s) orally once a day  U-100 Insulin Syringe, 1 mL: 1 application subcutaneously 2 times a day ** 1 mL holds up to 100 units of insulin **  U-100 Insulin Syringe, 1/2 mL: 1 application subcutaneously 2 times a day ** 1/2 mL holds up to 50 units of insulin **   U-100 Insulin Syringe, 3/10 mL: 1 application subcutaneously 2 times a day ** 3/10 mL holds up to 30 units of insulin **

## 2020-01-16 NOTE — DISCHARGE NOTE PROVIDER - NSDCCPCAREPLAN_GEN_ALL_CORE_FT
PRINCIPAL DISCHARGE DIAGNOSIS  Diagnosis: Seizure  Assessment and Plan of Treatment: resolved.      SECONDARY DISCHARGE DIAGNOSES  Diagnosis: Hyperglycemia  Assessment and Plan of Treatment:

## 2020-01-16 NOTE — DISCHARGE NOTE NURSING/CASE MANAGEMENT/SOCIAL WORK - NSDCPEWEB_GEN_ALL_CORE
[place] website --- [email or web address] Tobacco [email or web address] website --- [email or web address]

## 2020-01-16 NOTE — DISCHARGE NOTE NURSING/CASE MANAGEMENT/SOCIAL WORK - PATIENT PORTAL LINK FT
You can access the FollowMyHealth Patient Portal offered by [facility] by registering at the following website: [email or web address]. By joining [facility]’s FollowMyHealth portal, you will also be able to view your health information using other applications (apps) compatible with our system.

## 2020-01-16 NOTE — DISCHARGE NOTE NURSING/CASE MANAGEMENT/SOCIAL WORK - NSDCPEEMAIL_GEN_ALL_CORE
[facility] for Tobacco Control email [email or web address]

## 2020-01-16 NOTE — DIETITIAN INITIAL EVALUATION ADULT. - PERTINENT LABORATORY DATA
01-14 Na 138  Glu 208   K+ 3.7   Cr 0.62   BUN 8   Phos 2.7 mg/dL Alb 2.2(1/13)   PAB n/a   Hgb >15.5%   Hct n/a   HgA1C n/a     24Hr FS:304 mg/dL  232 mg/dL  235 mg/dL  244 mg/dL

## 2020-01-16 NOTE — DISCHARGE NOTE PROVIDER - PROVIDER TOKENS
PROVIDER:[TOKEN:[[id number]],FOLLOWUP:[1 week]],FREE:[LAST:[your PCP in a week],PHONE:[(   )    -],FAX:[(   )    -]]

## 2020-01-16 NOTE — DISCHARGE NOTE PROVIDER - CARE PROVIDER_API CALL
Bridget Cordero)  Neurology  [address] [place] [id number]  Phone: [phone]  Fax: [phone]  Follow Up Time: 1 week    your PCP in a week,   Phone: (   )    -  Fax: (   )    -  Follow Up Time:

## 2020-01-16 NOTE — DIETITIAN INITIAL EVALUATION ADULT. - PERTINENT MEDS FT
dextrose 40% Gel PRN  dextrose 5%.  dextrose 50% Injectable  dextrose 50% Injectable  dextrose 50% Injectable  enoxaparin Injectable  folic acid  glucagon  Injectable PRN  insulin glargine Injectable (LANTUS)  insulin lispro (HumaLOG) corrective regimen sliding scale  insulin lispro (HumaLOG) corrective regimen sliding scale  insulin lispro Injectable (HumaLOG)  levETIRAcetam  LORazepam   Injectable PRN  metFORMIN  multivitamin  pantoprazole    Tablet  propranolol  thiamine

## 2020-01-17 RX ORDER — INSULIN HUMAN 100 [IU]/ML
5 INJECTION, SOLUTION SUBCUTANEOUS
Qty: 6 | Refills: 0
Start: 2020-01-17

## 2020-01-17 RX ORDER — HUMAN INSULIN 100 [IU]/ML
10 INJECTION, SUSPENSION SUBCUTANEOUS
Qty: 6 | Refills: 0
Start: 2020-01-17

## 2020-01-22 DIAGNOSIS — R74.0 NONSPECIFIC ELEVATION OF LEVELS OF TRANSAMINASE AND LACTIC ACID DEHYDROGENASE [LDH]: ICD-10-CM

## 2020-01-22 DIAGNOSIS — K70.30 ALCOHOLIC CIRRHOSIS OF LIVER WITHOUT ASCITES: ICD-10-CM

## 2020-01-22 DIAGNOSIS — F17.210 NICOTINE DEPENDENCE, CIGARETTES, UNCOMPLICATED: ICD-10-CM

## 2020-01-22 DIAGNOSIS — I85.10 SECONDARY ESOPHAGEAL VARICES WITHOUT BLEEDING: ICD-10-CM

## 2020-01-22 DIAGNOSIS — R56.9 UNSPECIFIED CONVULSIONS: ICD-10-CM

## 2020-01-22 DIAGNOSIS — G40.909 EPILEPSY, UNSPECIFIED, NOT INTRACTABLE, WITHOUT STATUS EPILEPTICUS: ICD-10-CM

## 2020-01-22 DIAGNOSIS — D69.59 OTHER SECONDARY THROMBOCYTOPENIA: ICD-10-CM

## 2020-01-22 DIAGNOSIS — E11.65 TYPE 2 DIABETES MELLITUS WITH HYPERGLYCEMIA: ICD-10-CM

## 2020-01-22 DIAGNOSIS — E83.39 OTHER DISORDERS OF PHOSPHORUS METABOLISM: ICD-10-CM

## 2020-01-22 DIAGNOSIS — F10.239 ALCOHOL DEPENDENCE WITH WITHDRAWAL, UNSPECIFIED: ICD-10-CM

## 2020-06-05 ENCOUNTER — APPOINTMENT (OUTPATIENT)
Dept: INTERNAL MEDICINE | Facility: CLINIC | Age: 60
End: 2020-06-05

## 2021-06-07 NOTE — DIETITIAN INITIAL EVALUATION ADULT. - PROBLEM SELECTOR PLAN 1
Follow up of acute on ckd-4  No n/v, not sob, appetite good  Received a liter of fluid yesterday, edema remains well controlled, no diuretics for several days    Past Medical History:   Diagnosis Date   • Background diabetic retinopathy(362.01) 3/26/2003   • Chronic kidney disease, stage 4 (severe) (CMS/[facility])    • Closed fracture of unspecified part of femur 12/22/2004   • diabetes    • Diffuse cystic mastopathy 9/29/2003   • Hydronephrosis of right kidney 12/11/2001    Krco: Sepsis Evaluated by CT & stent. No cause found   • LENS REPLACEMENT OU 12/16/2004   • Obstruction of right ureteropelvic junction (UPJ) 03/28/2018   • Osteoporosis, unspecified 10/18/2007   • Other and unspecified hyperlipidemia 7/16/2007   • Other psoriasis 9/21/2001   • Paroxysmal atrial fibrillation (CMS/[facility]) 2017    Identified on electrocardiogram when presented with a TIA. Maintained on Eliquis.   • PRIM OPEN ANGLE GLAUCOMA (slt mixed angle) 9/6/2002   • Pulmonary hypertension (CMS/[facility])     Echocardiogram is revealed tricuspid regurgitation. LV and RV function normal.   • TIA (transient ischemic attack) 2017    Presented with slurred speech. Found to be in atrial fibrillation. Good resolution. Maintained on Eliquis.   • Type 2 diabetes mellitus, controlled, with renal complications (CMS/[facility])    • Unspecified essential hypertension 1/21/2002   • Urinary incontinence      Current Facility-Administered Medications   Medication   • NIFEdipine XL (PROCARDIA XL) ER tablet 120 mg   • isosorbide mononitrate (IMDUR) ER tablet 60 mg   • iron sucrose (VENOFER) 300 mg in sodium chloride 0.9 % 250 mL IVPB   • nystatin (MYCOSTATIN) powder   • [Held by provider] metOLazone (ZAROXOLYN) tablet 5 mg   • allopurinol (ZYLOPRIM) tablet 50 mg   • atorvastatin (LIPITOR) tablet 40 mg   • apixaBAN (ELIQUIS) tablet 2.5 mg   • metoPROLOL succinate (TOPROL-XL) ER tablet 25 mg   • timolol (TIMOPTIC) 0.5 % ophthalmic solution 1 drop   • sodium chloride (NORMAL SALINE)  0.9 % bolus 500 mL   • sodium chloride 0.9 % flush bag 25 mL   • sodium chloride (PF) 0.9 % injection 2 mL   • sodium chloride 0.9 % flush bag 25 mL   • Potassium Standard Replacement Protocol   • Magnesium Standard Replacement Protocol   • ondansetron (ZOFRAN) injection 4 mg   • hydrALAZINE (APRESOLINE) injection 10 mg   • doxazosin (CARDURA) tablet 8 mg   • dextrose 50 % injection 25 g   • dextrose 50 % injection 12.5 g   • glucagon (GLUCAGEN) injection 1 mg   • dextrose (GLUTOSE) 40 % gel 15 g   • dextrose (GLUTOSE) 40 % gel 30 g   • insulin glargine (LANTUS) injection 8 Units   • brimonidine (ALPHAGAN) 0.2 % ophthalmic solution 1 drop   • cholecalciferol (VITAMIN D) tablet 2,000 Units   • docusate sodium-sennosides (SENOKOT S) 50-8.6 MG 1 tablet   • loratadine (CLARITIN) tablet 10 mg   • dorzolamide (TRUSOPT) 2 % ophthalmic solution 1 drop   • [Held by provider] furosemide (LASIX INJECT) injection 80 mg     Exam in nad  Has jvd  Visit Vitals  /56   Pulse 78   Temp 97.7 °F (36.5 °C) (Oral)   Resp 18   Ht 5' 3\" (1.6 m)   Wt 75.4 kg   SpO2 99%   BMI 29.45 kg/m²     Lungs clear  1+ dependent edema    Alkaline Phosphatase (Units/L)   Date Value   06/05/2021 46     WBC (K/mcL)   Date Value   06/06/2021 6.9     RBC (mil/mcL)   Date Value   06/06/2021 2.80 (L)     HCT (%)   Date Value   06/06/2021 27.6 (L)     HGB (g/dL)   Date Value   06/06/2021 8.6 (L)     PLT (K/mcL)   Date Value   06/06/2021 179     Iron (mcg/dL)   Date Value   06/06/2021 111     Glucose (mg/dL)   Date Value   06/07/2021 119 (H)   06/06/2021 109 (H)     Sodium (mmol/L)   Date Value   06/07/2021 147 (H)   06/06/2021 144     Potassium (mmol/L)   Date Value   06/07/2021 4.0   06/06/2021 3.9     Chloride (mmol/L)   Date Value   06/07/2021 110 (H)   06/06/2021 107     Carbon Dioxide (mmol/L)   Date Value   06/07/2021 26   06/06/2021 28     BUN (mg/dL)   Date Value   06/07/2021 88 (H)   06/06/2021 83 (H)     Creatinine (mg/dL)   Date Value    06/07/2021 4.00 (H)   06/06/2021 3.98 (H)     Calcium (mg/dL)   Date Value   06/07/2021 8.7   06/06/2021 8.8     GFR Estimate,  (no units)   Date Value   01/19/2020 20   12/23/2019 20                Imp: acute on ckd, creatinine at a new baseline?   Doing ok otherwise, continue to hold the diuretic  Being loaded with iv iron, will not need it at discharge  Ok from a renal perspective for discharge anytime   admit to tele  unclear if etoh withdrawal sz, will put pt on prn ciwa and monitor for withdrawals, currently pt is not tremulous but had received benzos earlier for sz  check m[facility] brain  [facility]  neuro consult

## 2021-11-08 ENCOUNTER — APPOINTMENT (OUTPATIENT)
Dept: FAMILY MEDICINE | Facility: CLINIC | Age: 61
End: 2021-11-08
Payer: COMMERCIAL

## 2021-11-08 ENCOUNTER — LABORATORY RESULT (OUTPATIENT)
Age: 61
End: 2021-11-08

## 2021-11-08 VITALS
DIASTOLIC BLOOD PRESSURE: 84 MMHG | BODY MASS INDEX: 22.98 KG/M2 | HEIGHT: 66 IN | SYSTOLIC BLOOD PRESSURE: 140 MMHG | TEMPERATURE: 97.7 F | HEART RATE: 93 BPM | WEIGHT: 143 LBS | OXYGEN SATURATION: 97 %

## 2021-11-08 DIAGNOSIS — Z82.3 FAMILY HISTORY OF STROKE: ICD-10-CM

## 2021-11-08 DIAGNOSIS — R45.89 OTHER SYMPTOMS AND SIGNS INVOLVING EMOTIONAL STATE: ICD-10-CM

## 2021-11-08 DIAGNOSIS — Z83.3 FAMILY HISTORY OF DIABETES MELLITUS: ICD-10-CM

## 2021-11-08 DIAGNOSIS — R10.32 LEFT LOWER QUADRANT PAIN: ICD-10-CM

## 2021-11-08 LAB — PSA SERPL-MCNC: 1.24 NG/ML

## 2021-11-08 PROCEDURE — 99386 PREV VISIT NEW AGE 40-64: CPT | Mod: 25

## 2021-11-08 PROCEDURE — 90732 PPSV23 VACC 2 YRS+ SUBQ/IM: CPT

## 2021-11-08 PROCEDURE — 93000 ELECTROCARDIOGRAM COMPLETE: CPT

## 2021-11-08 PROCEDURE — G0009: CPT

## 2021-11-08 RX ORDER — PANTOPRAZOLE 40 MG/1
40 TABLET, DELAYED RELEASE ORAL
Qty: 90 | Refills: 1 | Status: DISCONTINUED | COMMUNITY
End: 2021-11-08

## 2021-11-08 RX ORDER — PROPRANOLOL HYDROCHLORIDE 20 MG/1
20 TABLET ORAL TWICE DAILY
Qty: 60 | Refills: 2 | Status: DISCONTINUED | COMMUNITY
End: 2021-11-08

## 2021-11-08 NOTE — PHYSICAL EXAM
[No Acute Distress] : no acute distress [Well Nourished] : well nourished [Well Developed] : well developed [Well-Appearing] : well-appearing [Normal Sclera/Conjunctiva] : normal sclera/conjunctiva [PERRL] : pupils equal round and reactive to light [EOMI] : extraocular movements intact [Normal Outer Ear/Nose] : the outer ears and nose were normal in appearance [Normal Oropharynx] : the oropharynx was normal [No JVD] : no jugular venous distention [No Lymphadenopathy] : no lymphadenopathy [Supple] : supple [Thyroid Normal, No Nodules] : the thyroid was normal and there were no nodules present [No Respiratory Distress] : no respiratory distress  [No Accessory Muscle Use] : no accessory muscle use [Clear to Auscultation] : lungs were clear to auscultation bilaterally [Normal Rate] : normal rate  [Regular Rhythm] : with a regular rhythm [Normal S1, S2] : normal S1 and S2 [No Murmur] : no murmur heard [No Carotid Bruits] : no carotid bruits [No Abdominal Bruit] : a ~M bruit was not heard ~T in the abdomen [No Varicosities] : no varicosities [Pedal Pulses Present] : the pedal pulses are present [No Edema] : there was no peripheral edema [No Palpable Aorta] : no palpable aorta [No Extremity Clubbing/Cyanosis] : no extremity clubbing/cyanosis [Soft] : abdomen soft [Non Tender] : non-tender [Non-distended] : non-distended [No Masses] : no abdominal mass palpated [Normal Bowel Sounds] : normal bowel sounds [Normal Supraclavicular Nodes] : no supraclavicular lymphadenopathy [Normal Posterior Cervical Nodes] : no posterior cervical lymphadenopathy [Normal Anterior Cervical Nodes] : no anterior cervical lymphadenopathy [No CVA Tenderness] : no CVA  tenderness [No Spinal Tenderness] : no spinal tenderness [No Joint Swelling] : no joint swelling [Grossly Normal Strength/Tone] : grossly normal strength/tone [No Rash] : no rash [Coordination Grossly Intact] : coordination grossly intact [No Focal Deficits] : no focal deficits [Normal Gait] : normal gait [Normal Affect] : the affect was normal [Alert and Oriented x3] : oriented to person, place, and time [Normal Insight/Judgement] : insight and judgment were intact [de-identified] : left impacted cerumen [de-identified] : severely enlarged liver; umbilical hernia [de-identified] : left inguinal hernia

## 2021-11-08 NOTE — HISTORY OF PRESENT ILLNESS
[FreeTextEntry1] : 61 year old male who presents today for a complete physical exam.\par pt comes in w/ multiple medical issues\par  [de-identified] : hx of hepatic cirrhosis, has not seen hepatologist in over 2 yrs, still drinking, states 3 glasses of wine, smoking 1 ppd\par depressed

## 2021-11-08 NOTE — HEALTH RISK ASSESSMENT
[] : Yes [20 or more] : 20 or more [Yes] : Yes [Fair] : ~his/her~ current health as fair  [2] : 2) Feeling down, depressed, or hopeless for more than half of the days (2) [With Family] : lives with family [# of Members in Household ___] :  household currently consist of [unfilled] member(s) [Employed] : employed [] :  [# Of Children ___] : has [unfilled] children [Feels Safe at Home] : Feels safe at home [Fully functional (bathing, dressing, toileting, transferring, walking, feeding)] : Fully functional (bathing, dressing, toileting, transferring, walking, feeding) [Fully functional (using the telephone, shopping, preparing meals, housekeeping, doing laundry, using] : Fully functional and needs no help or supervision to perform IADLs (using the telephone, shopping, preparing meals, housekeeping, doing laundry, using transportation, managing medications and managing finances) [Smoke Detector] : smoke detector [Seat Belt] :  uses seat belt [de-identified] : smoker 1 ppd [de-identified] : 3 glasses of wine/day [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

## 2021-11-10 DIAGNOSIS — R73.09 OTHER ABNORMAL GLUCOSE: ICD-10-CM

## 2021-11-10 LAB
25(OH)D3 SERPL-MCNC: 24.7 NG/ML
ALBUMIN SERPL ELPH-MCNC: 4.4 G/DL
ALP BLD-CCNC: 175 U/L
ALT SERPL-CCNC: 45 U/L
ANION GAP SERPL CALC-SCNC: 18 MMOL/L
APPEARANCE: CLEAR
AST SERPL-CCNC: 108 U/L
BASOPHILS # BLD AUTO: 0.04 K/UL
BASOPHILS NFR BLD AUTO: 0.6 %
BILIRUB SERPL-MCNC: 0.8 MG/DL
BILIRUBIN URINE: NEGATIVE
BLOOD URINE: NEGATIVE
BUN SERPL-MCNC: 12 MG/DL
C TRACH RRNA SPEC QL NAA+PROBE: NOT DETECTED
CALCIUM SERPL-MCNC: 9.3 MG/DL
CHLORIDE SERPL-SCNC: 100 MMOL/L
CHOLEST SERPL-MCNC: 198 MG/DL
CO2 SERPL-SCNC: 22 MMOL/L
COLOR: YELLOW
CREAT SERPL-MCNC: 0.64 MG/DL
EOSINOPHIL # BLD AUTO: 0.06 K/UL
EOSINOPHIL NFR BLD AUTO: 0.9 %
GGT SERPL-CCNC: 1110 U/L
GLUCOSE QUALITATIVE U: NEGATIVE
GLUCOSE SERPL-MCNC: 132 MG/DL
HBV SURFACE AG SER QL: NONREACTIVE
HCT VFR BLD CALC: 40.4 %
HCV AB SER QL: NONREACTIVE
HCV S/CO RATIO: 0.15 S/CO
HDLC SERPL-MCNC: 43 MG/DL
HGB BLD-MCNC: 13.8 G/DL
HIV1+2 AB SPEC QL IA.RAPID: NONREACTIVE
HSV 1+2 IGG SER IA-IMP: NEGATIVE
HSV 1+2 IGG SER IA-IMP: POSITIVE
HSV1 IGG SER QL: 0.2 INDEX
HSV2 IGG SER QL: 11.8 INDEX
IMM GRANULOCYTES NFR BLD AUTO: 0.3 %
KETONES URINE: NORMAL
LDLC SERPL CALC-MCNC: 122 MG/DL
LEUKOCYTE ESTERASE URINE: NEGATIVE
LYMPHOCYTES # BLD AUTO: 2.2 K/UL
LYMPHOCYTES NFR BLD AUTO: 32.4 %
MAN DIFF?: NORMAL
MCHC RBC-ENTMCNC: 34.2 GM/DL
MCHC RBC-ENTMCNC: 34.3 PG
MCV RBC AUTO: 100.5 FL
MONOCYTES # BLD AUTO: 0.52 K/UL
MONOCYTES NFR BLD AUTO: 7.6 %
N GONORRHOEA RRNA SPEC QL NAA+PROBE: NOT DETECTED
NEUTROPHILS # BLD AUTO: 3.96 K/UL
NEUTROPHILS NFR BLD AUTO: 58.2 %
NITRITE URINE: NEGATIVE
NONHDLC SERPL-MCNC: 155 MG/DL
PH URINE: 5.5
PLATELET # BLD AUTO: 76 K/UL
POTASSIUM SERPL-SCNC: 4.4 MMOL/L
PROT SERPL-MCNC: 7.6 G/DL
PROTEIN URINE: ABNORMAL
RBC # BLD: 4.02 M/UL
RBC # FLD: 13.6 %
SODIUM SERPL-SCNC: 140 MMOL/L
SOURCE AMPLIFICATION: NORMAL
SPECIFIC GRAVITY URINE: 1.03
T PALLIDUM AB SER QL IA: NEGATIVE
TESTOST SERPL-MCNC: 463 NG/DL
TRIGL SERPL-MCNC: 162 MG/DL
TSH SERPL-ACNC: 1.52 UIU/ML
UROBILINOGEN URINE: NORMAL
WBC # FLD AUTO: 6.8 K/UL

## 2021-11-16 ENCOUNTER — APPOINTMENT (OUTPATIENT)
Dept: HEPATOLOGY | Facility: CLINIC | Age: 61
End: 2021-11-16

## 2021-11-18 ENCOUNTER — NON-APPOINTMENT (OUTPATIENT)
Age: 61
End: 2021-11-18

## 2021-11-18 ENCOUNTER — APPOINTMENT (OUTPATIENT)
Dept: THORACIC SURGERY | Facility: CLINIC | Age: 61
End: 2021-11-18
Payer: COMMERCIAL

## 2021-11-18 ENCOUNTER — APPOINTMENT (OUTPATIENT)
Dept: SURGERY | Facility: CLINIC | Age: 61
End: 2021-11-18
Payer: COMMERCIAL

## 2021-11-18 VITALS — BODY MASS INDEX: 22.98 KG/M2 | HEIGHT: 66 IN | WEIGHT: 143 LBS

## 2021-11-18 DIAGNOSIS — Z12.2 ENCOUNTER FOR SCREENING FOR MALIGNANT NEOPLASM OF RESPIRATORY ORGANS: ICD-10-CM

## 2021-11-18 PROCEDURE — 99406 BEHAV CHNG SMOKING 3-10 MIN: CPT

## 2021-11-18 PROCEDURE — G0296 VISIT TO DETERM LDCT ELIG: CPT

## 2021-11-22 ENCOUNTER — OUTPATIENT (OUTPATIENT)
Dept: OUTPATIENT SERVICES | Facility: HOSPITAL | Age: 61
LOS: 1 days | End: 2021-11-22
Payer: COMMERCIAL

## 2021-11-22 ENCOUNTER — APPOINTMENT (OUTPATIENT)
Dept: CT IMAGING | Facility: IMAGING CENTER | Age: 61
End: 2021-11-22
Payer: COMMERCIAL

## 2021-11-22 DIAGNOSIS — Z90.49 ACQUIRED ABSENCE OF OTHER SPECIFIED PARTS OF DIGESTIVE TRACT: Chronic | ICD-10-CM

## 2021-11-22 DIAGNOSIS — F17.210 NICOTINE DEPENDENCE, CIGARETTES, UNCOMPLICATED: ICD-10-CM

## 2021-11-22 DIAGNOSIS — Z98.890 OTHER SPECIFIED POSTPROCEDURAL STATES: Chronic | ICD-10-CM

## 2021-11-22 PROCEDURE — 71271 CT THORAX LUNG CANCER SCR C-: CPT

## 2021-11-22 PROCEDURE — 71271 CT THORAX LUNG CANCER SCR C-: CPT | Mod: 26

## 2021-11-23 ENCOUNTER — RESULT REVIEW (OUTPATIENT)
Age: 61
End: 2021-11-23

## 2021-11-23 ENCOUNTER — APPOINTMENT (OUTPATIENT)
Dept: SURGERY | Facility: CLINIC | Age: 61
End: 2021-11-23
Payer: COMMERCIAL

## 2021-11-23 VITALS
WEIGHT: 140 LBS | BODY MASS INDEX: 22.5 KG/M2 | HEART RATE: 70 BPM | DIASTOLIC BLOOD PRESSURE: 76 MMHG | RESPIRATION RATE: 16 BRPM | TEMPERATURE: 97.8 F | OXYGEN SATURATION: 97 % | HEIGHT: 66 IN | SYSTOLIC BLOOD PRESSURE: 121 MMHG

## 2021-11-23 PROCEDURE — 99204 OFFICE O/P NEW MOD 45 MIN: CPT

## 2021-11-23 NOTE — PHYSICAL EXAM
[JVD] : no jugular venous distention  [Normal Breath Sounds] : Normal breath sounds [Normal Heart Sounds] : normal heart sounds [Abdominal Masses] : No abdominal masses [Abdomen Tenderness] : ~T ~M No abdominal tenderness [No HSM] : no hepatosplenomegaly [No Rash or Lesion] : No rash or lesion [Alert] : alert [Oriented to Person] : oriented to person [Oriented to Place] : oriented to place [Oriented to Time] : oriented to time [Calm] : calm [de-identified] : Developed well-nourished black male in no acute distress [de-identified] :  Within normal limits annual nerves II through XII intact-  [de-identified] : There is a reducible umbilical hernia present.  There is no evidence around the umbilicus of portal hypertension [de-identified] : There is a reducible left inguinal hernia present the left cord and testicle are normal there is no evidence of a right inguinal hernia [de-identified] : Normal strength and gait

## 2021-11-23 NOTE — CONSULT LETTER
[Dear  ___] : Dear  [unfilled], [Consult Letter:] : I had the pleasure of evaluating your patient, [unfilled]. [Please see my note below.] : Please see my note below. [Consult Closing:] : Thank you very much for allowing me to participate in the care of this patient.  If you have any questions, please do not hesitate to contact me. [Sincerely,] : Sincerely, [FreeTextEntry3] : I have reviewed all the documentation for this encounter with the patient and have edited where appropriate\par \par Dr. Mainor Virk

## 2021-11-23 NOTE — HISTORY OF PRESENT ILLNESS
[de-identified] : Matthew is a 60 y/o male here for consultation for possible left inguinal hernia. Patient felt bulge for a couple of months. \par  Today patient reports no pain. or any active symptoms.  The patient states that he no longer drinks.

## 2021-11-24 ENCOUNTER — OUTPATIENT (OUTPATIENT)
Dept: OUTPATIENT SERVICES | Facility: HOSPITAL | Age: 61
LOS: 1 days | End: 2021-11-24
Payer: COMMERCIAL

## 2021-11-24 ENCOUNTER — APPOINTMENT (OUTPATIENT)
Dept: CT IMAGING | Facility: IMAGING CENTER | Age: 61
End: 2021-11-24
Payer: COMMERCIAL

## 2021-11-24 DIAGNOSIS — Z98.890 OTHER SPECIFIED POSTPROCEDURAL STATES: Chronic | ICD-10-CM

## 2021-11-24 DIAGNOSIS — Z90.49 ACQUIRED ABSENCE OF OTHER SPECIFIED PARTS OF DIGESTIVE TRACT: Chronic | ICD-10-CM

## 2021-11-24 DIAGNOSIS — F10.20 ALCOHOL DEPENDENCE, UNCOMPLICATED: ICD-10-CM

## 2021-11-24 DIAGNOSIS — R16.0 HEPATOMEGALY, NOT ELSEWHERE CLASSIFIED: ICD-10-CM

## 2021-11-24 DIAGNOSIS — Z00.8 ENCOUNTER FOR OTHER GENERAL EXAMINATION: ICD-10-CM

## 2021-11-24 DIAGNOSIS — K74.60 UNSPECIFIED CIRRHOSIS OF LIVER: ICD-10-CM

## 2021-11-24 DIAGNOSIS — K42.9 UMBILICAL HERNIA WITHOUT OBSTRUCTION OR GANGRENE: ICD-10-CM

## 2021-11-24 DIAGNOSIS — F17.210 NICOTINE DEPENDENCE, CIGARETTES, UNCOMPLICATED: ICD-10-CM

## 2021-11-24 PROCEDURE — 74176 CT ABD & PELVIS W/O CONTRAST: CPT

## 2021-11-24 PROCEDURE — 74176 CT ABD & PELVIS W/O CONTRAST: CPT | Mod: 26

## 2021-11-28 NOTE — PLAN
[FreeTextEntry1] : LDCT is scheduled for 11/22/21 at the [facility] location.  He will follow up with Dr. Zarate for the results.

## 2021-11-28 NOTE — HISTORY OF PRESENT ILLNESS
[TextBox_13] : He denies hemoptysis, denies new cough, denies unexplained weight loss.\par He is a current smoker with a 35 pack year smoking history (0.75PPD x 46 years).  he is interested in support in quitting.  he is referred by Dr. Herminia Zarate.

## 2021-12-06 ENCOUNTER — APPOINTMENT (OUTPATIENT)
Dept: FAMILY MEDICINE | Facility: CLINIC | Age: 61
End: 2021-12-06
Payer: COMMERCIAL

## 2021-12-06 VITALS
TEMPERATURE: 98 F | BODY MASS INDEX: 23.3 KG/M2 | HEIGHT: 66 IN | OXYGEN SATURATION: 97 % | SYSTOLIC BLOOD PRESSURE: 118 MMHG | HEART RATE: 84 BPM | WEIGHT: 145 LBS | DIASTOLIC BLOOD PRESSURE: 72 MMHG

## 2021-12-06 DIAGNOSIS — I70.90 UNSPECIFIED ATHEROSCLEROSIS: ICD-10-CM

## 2021-12-06 DIAGNOSIS — M21.619 BUNION OF UNSPECIFIED FOOT: ICD-10-CM

## 2021-12-06 DIAGNOSIS — K80.20 CALCULUS OF GALLBLADDER W/OUT CHOLECYSTITIS W/OUT OBSTRUCTION: ICD-10-CM

## 2021-12-06 PROCEDURE — 99215 OFFICE O/P EST HI 40 MIN: CPT

## 2021-12-06 RX ORDER — CHROMIUM 200 MCG
25 MCG TABLET ORAL
Qty: 90 | Refills: 1 | Status: DISCONTINUED | COMMUNITY
Start: 2021-11-10 | End: 2021-12-06

## 2021-12-06 RX ORDER — NICOTINE 21 MG/24H
21 PATCH, EXTENDED RELEASE TRANSDERMAL DAILY
Qty: 1 | Refills: 0 | Status: DISCONTINUED | COMMUNITY
Start: 2021-11-08 | End: 2021-12-06

## 2021-12-06 NOTE — DATA REVIEWED
[FreeTextEntry1] : reviewed ct scan- few diverticula, enlarged prostate, gallstones, atherosclerosis

## 2021-12-06 NOTE — PHYSICAL EXAM
[No Acute Distress] : no acute distress [Well Nourished] : well nourished [Normal Sclera/Conjunctiva] : normal sclera/conjunctiva [EOMI] : extraocular movements intact [Normal Outer Ear/Nose] : the outer ears and nose were normal in appearance [No JVD] : no jugular venous distention [No Respiratory Distress] : no respiratory distress  [No Accessory Muscle Use] : no accessory muscle use [Coordination Grossly Intact] : coordination grossly intact [No Focal Deficits] : no focal deficits [Normal Gait] : normal gait [Normal Affect] : the affect was normal [Alert and Oriented x3] : oriented to person, place, and time [Normal Insight/Judgement] : insight and judgment were intact [de-identified] : bony protrusion to rt foot medially [de-identified] : callous to bottom of feet, thickened toenail

## 2021-12-06 NOTE — HISTORY OF PRESENT ILLNESS
[FreeTextEntry1] : here for f/u; states has not followed up w/ hepatologist due to address mix up\par states he stopped drinking since last visit; has not f/u'd w/ GI; still smoking, trying ecig\par c/o painful rt foot bunion

## 2021-12-30 ENCOUNTER — OUTPATIENT (OUTPATIENT)
Dept: OUTPATIENT SERVICES | Facility: HOSPITAL | Age: 61
LOS: 1 days | End: 2021-12-30
Payer: COMMERCIAL

## 2021-12-30 ENCOUNTER — APPOINTMENT (OUTPATIENT)
Dept: HEPATOLOGY | Facility: CLINIC | Age: 61
End: 2021-12-30
Payer: COMMERCIAL

## 2021-12-30 VITALS
SYSTOLIC BLOOD PRESSURE: 130 MMHG | HEART RATE: 90 BPM | DIASTOLIC BLOOD PRESSURE: 83 MMHG | HEIGHT: 66 IN | WEIGHT: 141.98 LBS | OXYGEN SATURATION: 99 % | RESPIRATION RATE: 18 BRPM | TEMPERATURE: 98 F

## 2021-12-30 VITALS
SYSTOLIC BLOOD PRESSURE: 132 MMHG | WEIGHT: 145 LBS | DIASTOLIC BLOOD PRESSURE: 75 MMHG | HEIGHT: 66 IN | RESPIRATION RATE: 15 BRPM | HEART RATE: 82 BPM | BODY MASS INDEX: 23.3 KG/M2

## 2021-12-30 DIAGNOSIS — K40.90 UNILATERAL INGUINAL HERNIA, WITHOUT OBSTRUCTION OR GANGRENE, NOT SPECIFIED AS RECURRENT: ICD-10-CM

## 2021-12-30 DIAGNOSIS — Z90.49 ACQUIRED ABSENCE OF OTHER SPECIFIED PARTS OF DIGESTIVE TRACT: Chronic | ICD-10-CM

## 2021-12-30 DIAGNOSIS — Z98.890 OTHER SPECIFIED POSTPROCEDURAL STATES: Chronic | ICD-10-CM

## 2021-12-30 DIAGNOSIS — E11.9 TYPE 2 DIABETES MELLITUS WITHOUT COMPLICATIONS: ICD-10-CM

## 2021-12-30 PROCEDURE — 83036 HEMOGLOBIN GLYCOSYLATED A1C: CPT

## 2021-12-30 PROCEDURE — 99214 OFFICE O/P EST MOD 30 MIN: CPT

## 2021-12-30 PROCEDURE — 80053 COMPREHEN METABOLIC PANEL: CPT

## 2021-12-30 PROCEDURE — 85027 COMPLETE CBC AUTOMATED: CPT

## 2021-12-30 PROCEDURE — 99204 OFFICE O/P NEW MOD 45 MIN: CPT

## 2021-12-30 PROCEDURE — G0463: CPT

## 2021-12-30 NOTE — HISTORY OF PRESENT ILLNESS
[de-identified] : Mr. Matthew Howard 61 yoM with history of alcohol cirrhosis here to establish care, last seen by hepatology in 2019. He reports drinking few gin daily for many years, stopped in 2019 for few months after being hospitalized for bleeding peptic ulcer but relapsed. He has been drinking 1 bottle of wine daily but stopped 3 months ago after being told that he has enlarged liver. States that he's aware of liver cirrhosis. He currently feels well. Denies abdominal pain, nausea, vomiting, melena, hematochezia, or hematemesis. He reports scheduled for left inguinal hernia and umbilical hernia repair on 1/10/21 with Dr. Virk. He reports no pain or active symptoms. \par \par Pt was admitted at [facility] 11/12/19 for hematemesis and dark stools, at that time he had an EGD that showed nonbleeding large (>5mm) esophageal varices in lower and mid esophagus, as well as localized area of erythematous and edematous mucosa in lesser curvature with ulceration, portal hypertensive gastropathy.  No EGD since 2019. \par \par He had BW for pre-surgical testing this morning, INR pending. ALT 15, AST 22, , CR 0.58, sodium 131. Tbil 0.4 \par \par Abdo CT 11/24/21 showed 1.1 cm hypodense central right hepatic lesion that is indeterminate (test done w/o contrast) and is stable since 2019.

## 2021-12-30 NOTE — ASSESSMENT
[FreeTextEntry1] : Mr. Matthew Howard 61 yoM with alcohol cirrhosis here to establish care.\par \par -Long history of alcohol abuse, stopped 3 months ago. Explained that he must stop all alcohol use. \par -Abdo CT 11/24/21 showed 1.1 cm hypodense central right hepatic lesion that is indeterminate (test done w/o contrast) and is stable since 2019. Recommended MRI w/wo contrast to assess for HCC. Will get AFP level.\par -Last EGD Nov 2019 showed non-bleeding large (>5mm) esophageal varices, portal hypertensive gastropathy. Will schedule re-peat at next visit\par -Ascites- none\par -PSE none\par -hepatic encephalopathy- none on exam today \par -Scheduled for  left inguinal hernia and umbilical hernia repair on 1/10/21 with Dr. Virk. Discussed risk of liver decompensation post surgery due to cirrhosis. INR level is pending. Will calculate MELD-Na and  post-operative mortality risk once available. \par \par Plan:\par BW. MRI, F/U in 1 month. \par

## 2021-12-30 NOTE — H&P PST ADULT - NSICDXPASTMEDICALHX_GEN_ALL_CORE_FT
PAST MEDICAL HISTORY:  Alcohol abuse stopped 3months ago    Emphysema lung     History of cirrhosis enlarged    History of GI bleed 2018    Type 2 diabetes mellitus 2020 diet controlled

## 2021-12-30 NOTE — REASON FOR VISIT
[Initial Eval - Existing Diagnosis] : an initial evaluation of an existing diagnosis [FreeTextEntry1] : Cirrhosis of the liver

## 2021-12-30 NOTE — H&P PST ADULT - FALL HARM RISK - UNIVERSAL INTERVENTIONS
Bed in lowest position, wheels locked, appropriate side rails in place/Call bell, personal items and telephone in reach/Instruct patient to call for assistance before getting out of bed or chair/Non-slip footwear when patient is out of bed/[place] to call system/Physically safe environment - no spills, clutter or unnecessary equipment/Purposeful Proactive Rounding/Room/bathroom lighting operational, light cord in reach

## 2021-12-30 NOTE — H&P PST ADULT - HISTORY OF PRESENT ILLNESS
61yr old male with left inguinal hernia and umbilical hernia complaining of discomfort.  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 Now coming in for left inguinal  hernia repair and umbilical hernia repair. Hx sig for Cirrhotic hepatomegaly from drinking  stopped 3months ago and diet controlled diabetes. Pt seeing liver specialist.    Note; covid test 1/7/22 [facility]

## 2021-12-30 NOTE — PHYSICAL EXAM
[Scleral Icterus] : No Scleral Icterus [Abdominal  Ascites] : no ascites [Asterixis] : no asterixis observed [Jaundice] : No jaundice [Palmar Erythema] : no Palmar Erythema [General Appearance - Alert] : alert [General Appearance - In No Acute Distress] : in no acute distress [Sclera] : the sclera and conjunctiva were normal [Outer Ear] : the ears and nose were normal in appearance [Neck Appearance] : the appearance of the neck was normal [Neck Cervical Mass (___cm)] : no neck mass was observed [Respiration, Rhythm And Depth] : normal respiratory rhythm and effort [Auscultation Breath Sounds / Voice Sounds] : lungs were clear to auscultation bilaterally [Heart Rate And Rhythm] : heart rate was normal and rhythm regular [Heart Sounds] : normal S1 and S2 [Edema] : there was no peripheral edema [Bowel Sounds] : normal bowel sounds [Abdomen Soft] : soft [Abdomen Tenderness] : non-tender [] : no hepato-splenomegaly [Abdomen Mass (___ Cm)] : no abdominal mass palpated [FreeTextEntry1] : reducible umbilical hernia [Nail Clubbing] : no clubbing  or cyanosis of the fingernails [Skin Turgor] : normal skin turgor [No Focal Deficits] : no focal deficits [Oriented To Time, Place, And Person] : oriented to person, place, and time [Affect] : the affect was normal

## 2022-01-07 ENCOUNTER — NON-APPOINTMENT (OUTPATIENT)
Age: 62
End: 2022-01-07

## 2022-01-07 ENCOUNTER — APPOINTMENT (OUTPATIENT)
Dept: FAMILY MEDICINE | Facility: CLINIC | Age: 62
End: 2022-01-07

## 2022-01-07 ENCOUNTER — OUTPATIENT (OUTPATIENT)
Dept: OUTPATIENT SERVICES | Facility: HOSPITAL | Age: 62
LOS: 1 days | End: 2022-01-07
Payer: COMMERCIAL

## 2022-01-07 DIAGNOSIS — Z90.49 ACQUIRED ABSENCE OF OTHER SPECIFIED PARTS OF DIGESTIVE TRACT: Chronic | ICD-10-CM

## 2022-01-07 DIAGNOSIS — Z98.890 OTHER SPECIFIED POSTPROCEDURAL STATES: Chronic | ICD-10-CM

## 2022-01-07 DIAGNOSIS — Z11.52 ENCOUNTER FOR SCREENING FOR COVID-19: ICD-10-CM

## 2022-01-07 LAB — SARS-COV-2 RNA SPEC QL NAA+PROBE: SIGNIFICANT CHANGE UP

## 2022-01-07 PROCEDURE — C9803: CPT

## 2022-01-07 PROCEDURE — U0005: CPT

## 2022-01-07 PROCEDURE — U0003: CPT

## 2022-01-08 ENCOUNTER — NON-APPOINTMENT (OUTPATIENT)
Age: 62
End: 2022-01-08

## 2022-01-10 ENCOUNTER — APPOINTMENT (OUTPATIENT)
Dept: SURGERY | Facility: HOSPITAL | Age: 62
End: 2022-01-10

## 2022-01-13 ENCOUNTER — OUTPATIENT (OUTPATIENT)
Dept: OUTPATIENT SERVICES | Facility: HOSPITAL | Age: 62
LOS: 1 days | End: 2022-01-13
Payer: MEDICAID

## 2022-01-13 ENCOUNTER — APPOINTMENT (OUTPATIENT)
Dept: MRI IMAGING | Facility: CLINIC | Age: 62
End: 2022-01-13
Payer: MEDICAID

## 2022-01-13 DIAGNOSIS — Z98.890 OTHER SPECIFIED POSTPROCEDURAL STATES: Chronic | ICD-10-CM

## 2022-01-13 DIAGNOSIS — Z90.49 ACQUIRED ABSENCE OF OTHER SPECIFIED PARTS OF DIGESTIVE TRACT: Chronic | ICD-10-CM

## 2022-01-13 DIAGNOSIS — Z00.8 ENCOUNTER FOR OTHER GENERAL EXAMINATION: ICD-10-CM

## 2022-01-13 PROCEDURE — A9585: CPT

## 2022-01-13 PROCEDURE — 74183 MRI ABD W/O CNTR FLWD CNTR: CPT

## 2022-01-13 PROCEDURE — 74183 MRI ABD W/O CNTR FLWD CNTR: CPT | Mod: 26

## 2022-01-17 ENCOUNTER — APPOINTMENT (OUTPATIENT)
Dept: CARDIOLOGY | Facility: CLINIC | Age: 62
End: 2022-01-17

## 2022-01-20 ENCOUNTER — APPOINTMENT (OUTPATIENT)
Dept: FAMILY MEDICINE | Facility: CLINIC | Age: 62
End: 2022-01-20
Payer: MEDICAID

## 2022-01-20 VITALS
SYSTOLIC BLOOD PRESSURE: 130 MMHG | DIASTOLIC BLOOD PRESSURE: 80 MMHG | WEIGHT: 143 LBS | OXYGEN SATURATION: 98 % | HEIGHT: 66 IN | TEMPERATURE: 97.9 F | BODY MASS INDEX: 22.98 KG/M2 | HEART RATE: 85 BPM

## 2022-01-20 PROCEDURE — 99214 OFFICE O/P EST MOD 30 MIN: CPT | Mod: 25

## 2022-01-20 NOTE — REVIEW OF SYSTEMS
[Cough] : cough [Frequency] : frequency [Negative] : Gastrointestinal [Chest Pain] : no chest pain [Palpitations] : no palpitations [Shortness Of Breath] : no shortness of breath [Wheezing] : no wheezing [Headache] : no headache [Dizziness] : no dizziness [FreeTextEntry6] : smokes 1 pack per day

## 2022-01-20 NOTE — HISTORY OF PRESENT ILLNESS
[FreeTextEntry1] : Here for follow-up-elevated sugars on preop testing. [de-identified] : Here for follow-up-elevated sugars on preop testing.\par \par Was admitted for DM early 2020. \par Never saw Endocrine. \par \par Had been controlled with diet. \par \par No alcohol use for 3 months. \par Has been checking sugars 264 in the AM; 160 in AM. \par \par Diet has been off.  Eating some ice cream, crackers. \par Had lunch 1 hour ago. \par \par Feeling well today.

## 2022-01-20 NOTE — PLAN
[FreeTextEntry1] : Will follow up labwork drawn in office today.\par \par DM-recheck A1c; will need to start meds.  Endocrine and DM educator referral placed.\par Nutrition handout given. \par \par Will adjust meds based on labs. \par Patient expressed understanding of plan.\par

## 2022-01-21 LAB
ALBUMIN SERPL ELPH-MCNC: 4.4 G/DL
ALP BLD-CCNC: 111 U/L
ALT SERPL-CCNC: 16 U/L
ANION GAP SERPL CALC-SCNC: 12 MMOL/L
APPEARANCE: CLEAR
AST SERPL-CCNC: 25 U/L
BASOPHILS # BLD AUTO: 0.03 K/UL
BASOPHILS NFR BLD AUTO: 0.4 %
BILIRUB SERPL-MCNC: 0.3 MG/DL
BILIRUBIN URINE: NEGATIVE
BLOOD URINE: NEGATIVE
BUN SERPL-MCNC: 5 MG/DL
CALCIUM SERPL-MCNC: 9.4 MG/DL
CHLORIDE SERPL-SCNC: 101 MMOL/L
CHOLEST SERPL-MCNC: 151 MG/DL
CO2 SERPL-SCNC: 25 MMOL/L
COLOR: NORMAL
CREAT SERPL-MCNC: 0.66 MG/DL
EOSINOPHIL # BLD AUTO: 0.12 K/UL
EOSINOPHIL NFR BLD AUTO: 1.6 %
ESTIMATED AVERAGE GLUCOSE: 209 MG/DL
GLUCOSE QUALITATIVE U: NEGATIVE
GLUCOSE SERPL-MCNC: 213 MG/DL
HBA1C MFR BLD HPLC: 8.9 %
HCT VFR BLD CALC: 39 %
HDLC SERPL-MCNC: 33 MG/DL
HGB BLD-MCNC: 12.9 G/DL
IMM GRANULOCYTES NFR BLD AUTO: 0.1 %
KETONES URINE: NEGATIVE
LDLC SERPL CALC-MCNC: 93 MG/DL
LEUKOCYTE ESTERASE URINE: NEGATIVE
LYMPHOCYTES # BLD AUTO: 3.24 K/UL
LYMPHOCYTES NFR BLD AUTO: 42.7 %
MAN DIFF?: NORMAL
MCHC RBC-ENTMCNC: 31.9 PG
MCHC RBC-ENTMCNC: 33.1 GM/DL
MCV RBC AUTO: 96.5 FL
MONOCYTES # BLD AUTO: 0.4 K/UL
MONOCYTES NFR BLD AUTO: 5.3 %
NEUTROPHILS # BLD AUTO: 3.78 K/UL
NEUTROPHILS NFR BLD AUTO: 49.9 %
NITRITE URINE: NEGATIVE
NONHDLC SERPL-MCNC: 118 MG/DL
PH URINE: 7
PLATELET # BLD AUTO: 102 K/UL
POTASSIUM SERPL-SCNC: 4.3 MMOL/L
PROT SERPL-MCNC: 7.5 G/DL
PROTEIN URINE: NEGATIVE
RBC # BLD: 4.04 M/UL
RBC # FLD: 11.9 %
SODIUM SERPL-SCNC: 137 MMOL/L
SPECIFIC GRAVITY URINE: 1
TRIGL SERPL-MCNC: 124 MG/DL
TSH SERPL-ACNC: 0.97 UIU/ML
UROBILINOGEN URINE: NORMAL
WBC # FLD AUTO: 7.58 K/UL

## 2022-01-25 ENCOUNTER — APPOINTMENT (OUTPATIENT)
Dept: ENDOCRINOLOGY | Facility: CLINIC | Age: 62
End: 2022-01-25
Payer: MEDICAID

## 2022-01-25 VITALS
HEART RATE: 79 BPM | WEIGHT: 143 LBS | DIASTOLIC BLOOD PRESSURE: 80 MMHG | BODY MASS INDEX: 23.08 KG/M2 | TEMPERATURE: 98.6 F | SYSTOLIC BLOOD PRESSURE: 126 MMHG | RESPIRATION RATE: 15 BRPM | OXYGEN SATURATION: 99 %

## 2022-01-25 LAB — GLUCOSE BLDC GLUCOMTR-MCNC: 244

## 2022-01-25 PROCEDURE — 99214 OFFICE O/P EST MOD 30 MIN: CPT

## 2022-01-25 PROCEDURE — 99204 OFFICE O/P NEW MOD 45 MIN: CPT

## 2022-01-25 PROCEDURE — 82962 GLUCOSE BLOOD TEST: CPT

## 2022-01-26 LAB
CHOLEST SERPL-MCNC: 171 MG/DL
HDLC SERPL-MCNC: 37 MG/DL
LDLC SERPL DIRECT ASSAY-MCNC: 109 MG/DL
T3FREE SERPL-MCNC: 2.74 PG/ML
T4 FREE SERPL-MCNC: 1 NG/DL
TRIGL SERPL-MCNC: 77 MG/DL
TSH SERPL-ACNC: 1.25 UIU/ML

## 2022-02-10 ENCOUNTER — APPOINTMENT (OUTPATIENT)
Dept: HEPATOLOGY | Facility: CLINIC | Age: 62
End: 2022-02-10
Payer: MEDICAID

## 2022-02-10 VITALS
RESPIRATION RATE: 16 BRPM | DIASTOLIC BLOOD PRESSURE: 76 MMHG | HEIGHT: 66 IN | HEART RATE: 89 BPM | SYSTOLIC BLOOD PRESSURE: 156 MMHG | WEIGHT: 144 LBS | BODY MASS INDEX: 23.14 KG/M2

## 2022-02-10 PROCEDURE — 99214 OFFICE O/P EST MOD 30 MIN: CPT

## 2022-02-10 NOTE — PHYSICAL EXAM
[General Appearance - Alert] : alert [General Appearance - In No Acute Distress] : in no acute distress [Sclera] : the sclera and conjunctiva were normal [Neck Appearance] : the appearance of the neck was normal [Neck Cervical Mass (___cm)] : no neck mass was observed [Respiration, Rhythm And Depth] : normal respiratory rhythm and effort [Auscultation Breath Sounds / Voice Sounds] : lungs were clear to auscultation bilaterally [Heart Rate And Rhythm] : heart rate was normal and rhythm regular [Heart Sounds] : normal S1 and S2 [Edema] : there was no peripheral edema [Bowel Sounds] : normal bowel sounds [Abdomen Soft] : soft [Abdomen Tenderness] : non-tender [] : no hepato-splenomegaly [Abdomen Mass (___ Cm)] : no abdominal mass palpated [Nail Clubbing] : no clubbing  or cyanosis of the fingernails [Skin Turgor] : normal skin turgor [No Focal Deficits] : no focal deficits [Oriented To Time, Place, And Person] : oriented to person, place, and time [Outer Ear] : the ears and nose were normal in appearance [Scleral Icterus] : No Scleral Icterus [Abdominal  Ascites] : no ascites [Asterixis] : no asterixis observed [Jaundice] : No jaundice [Palmar Erythema] : no Palmar Erythema [FreeTextEntry1] : reducible umbilical hernia

## 2022-02-10 NOTE — HISTORY OF PRESENT ILLNESS
[de-identified] : Mr. 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 He currently feels well. Denies abdominal pain, nausea, vomiting, melena, hematochezia, or hematemesis. He did not get BW ordered at his last visit in Dec. He was scheduled for left inguinal hernia and umbilical hernia repair on 1/10/21 with Dr. Virk but surgery was canceled due to A1c >8 and cirrhosis. He reports no pain or active symptoms related to umbilical hernia. 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 He is seeing endocrinologist to better control his T2DM. \par \par He reports drinking few gin a day, everyday for many years, stopped in 2019 for few months after being hospitalized for bleeding peptic ulcer but relapsed. He has been drinking 1 bottle of wine daily but stopped completely in Oct 2021 after being told that he has enlarged liver. States that he's aware of liver cirrhosis.  \par \par Pt was admitted at [facility] 11/12/19 for hematemesis and dark stools, at that time he had an EGD that showed nonbleeding large (>5mm) esophageal varices in lower and mid esophagus, as well as localized area of erythematous and edematous mucosa in lesser curvature with ulceration, portal hypertensive gastropathy.  No EGD since 2019. Not on NSBB. \par \par Said he never had a screening colonoscopy. No family history of colon cancer.\par \par -Abdo CT 11/24/21 showed 1.1 cm hypodense central right hepatic lesion that is indeterminate (test done w/o contrast) and is stable since 2019. \par -Abdo MRI 1/13/22 showed 1.2 cm right hepatic lobe cyst, no ascites, hepatic and portal veins are patent\par \par -BW 1/20/22 ALT 16, AST 25, CR 0.66, ALT 16, AST 25, Tbil 0.3, CR 0.66, Na 137, no INR level. \par BW 12/30/21, INR not done. ALT 15, AST 22, , CR 0.58, sodium 131. Tbil 0.4 \par \par

## 2022-02-11 LAB
A1AT SERPL-MCNC: 166 MG/DL
ACE BLD-CCNC: 41 U/L
AFP-TM SERPL-MCNC: 8.3 NG/ML
ALBUMIN SERPL ELPH-MCNC: 4.8 G/DL
ALP BLD-CCNC: 167 U/L
ALT SERPL-CCNC: 24 U/L
ANION GAP SERPL CALC-SCNC: 14 MMOL/L
AST SERPL-CCNC: 32 U/L
BASOPHILS # BLD AUTO: 0.03 K/UL
BASOPHILS NFR BLD AUTO: 0.4 %
BILIRUB SERPL-MCNC: 0.3 MG/DL
BUN SERPL-MCNC: 15 MG/DL
CALCIUM SERPL-MCNC: 10 MG/DL
CERULOPLASMIN SERPL-MCNC: 27 MG/DL
CHLORIDE SERPL-SCNC: 103 MMOL/L
CO2 SERPL-SCNC: 23 MMOL/L
CREAT SERPL-MCNC: 0.68 MG/DL
EOSINOPHIL # BLD AUTO: 0.1 K/UL
EOSINOPHIL NFR BLD AUTO: 1.2 %
HBV CORE IGG+IGM SER QL: NONREACTIVE
HBV SURFACE AB SER QL: NONREACTIVE
HBV SURFACE AG SER QL: NONREACTIVE
HCT VFR BLD CALC: 40.7 %
HCV AB SER QL: NONREACTIVE
HCV S/CO RATIO: 0.14 S/CO
HEPATITIS A IGG ANTIBODY: NONREACTIVE
HGB BLD-MCNC: 13.4 G/DL
IGA SER QL IEP: 593 MG/DL
IGG SER QL IEP: 1119 MG/DL
IGM SER QL IEP: 67 MG/DL
IMM GRANULOCYTES NFR BLD AUTO: 0.2 %
INR PPP: 1.11 RATIO
LYMPHOCYTES # BLD AUTO: 2.62 K/UL
LYMPHOCYTES NFR BLD AUTO: 32.5 %
MAN DIFF?: NORMAL
MCHC RBC-ENTMCNC: 31 PG
MCHC RBC-ENTMCNC: 32.9 GM/DL
MCV RBC AUTO: 94.2 FL
MONOCYTES # BLD AUTO: 0.46 K/UL
MONOCYTES NFR BLD AUTO: 5.7 %
NEUTROPHILS # BLD AUTO: 4.82 K/UL
NEUTROPHILS NFR BLD AUTO: 60 %
PLATELET # BLD AUTO: 93 K/UL
POTASSIUM SERPL-SCNC: 4.4 MMOL/L
PROT SERPL-MCNC: 8 G/DL
PT BLD: 13.1 SEC
RBC # BLD: 4.32 M/UL
RBC # FLD: 12.4 %
SODIUM SERPL-SCNC: 140 MMOL/L
WBC # FLD AUTO: 8.05 K/UL

## 2022-02-14 LAB
LKM AB SER QL IF: <20.1 UNITS
MITOCHONDRIA AB SER IF-ACNC: NORMAL
SMOOTH MUSCLE AB SER QL IF: ABNORMAL

## 2022-02-15 LAB
ANA PAT FLD IF-IMP: ABNORMAL
ANA SER IF-ACNC: ABNORMAL
SOLUBLE LIVER IGG SER IA-ACNC: 1.1

## 2022-02-24 ENCOUNTER — APPOINTMENT (OUTPATIENT)
Dept: ENDOCRINOLOGY | Facility: CLINIC | Age: 62
End: 2022-02-24
Payer: MEDICAID

## 2022-02-24 VITALS
TEMPERATURE: 98.5 F | HEIGHT: 66 IN | OXYGEN SATURATION: 98 % | DIASTOLIC BLOOD PRESSURE: 78 MMHG | HEART RATE: 78 BPM | BODY MASS INDEX: 23.14 KG/M2 | SYSTOLIC BLOOD PRESSURE: 125 MMHG | WEIGHT: 144 LBS

## 2022-02-24 DIAGNOSIS — K42.9 UMBILICAL HERNIA W/OUT OBSTRUCTION OR GANGRENE: ICD-10-CM

## 2022-02-24 LAB
GLUCOSE BLDC GLUCOMTR-MCNC: 94
HBA1C MFR BLD HPLC: 7.3

## 2022-02-24 PROCEDURE — 83036 HEMOGLOBIN GLYCOSYLATED A1C: CPT | Mod: QW

## 2022-02-24 PROCEDURE — 82962 GLUCOSE BLOOD TEST: CPT

## 2022-02-24 PROCEDURE — 99214 OFFICE O/P EST MOD 30 MIN: CPT

## 2022-02-24 NOTE — HISTORY OF PRESENT ILLNESS
[FreeTextEntry1] : Mr. LILIBETH US is a 61 year old male who presents for initial endocrine evaluation with regard to a hx of type 2 dm. The diabetes has been present for about 1 year. Was diagnosed when he was hospitalized for blood glucose that was 700 and went into diabetic coma. He denies any history of retinopathy or nephropathy. With regard to neuropathy,he denies any neurologic signs or symptoms. For the diabetes, he is taking Alogliptin- Metformin 12.5- 500 mg one tab bid. Was previously on insulin then discontinued. He denies polyuria, polydipsia, or any visual changes. He too denies any skin lesions, skin breakdown or non-healing areas of skin. He too denies any podiatric concerns. Ophthalmologic evaluation is up to date. Home glucose monitoring has shown values to be running  140s- 160s with occasional values up to 180s after eating. Lowest value in the am was 84. Checks in the am and hs. He does deny any hypoglycemia or hypoglycemic signs or symptoms. Eats about 2x per day.\par \par A1c on 01/20/2022 returned at 8.9%.CMP was wnl with LDL at 93 and Trig at 124.In November of 2021 Total testosterone returned at 463 and TSH at 1.52 with Vitamin d 25-OH at 24.7\par POCT A1c returned today at 7.3%\par POCT blood glucose returned today at 94  mg/dL\par \par FHx brother and sister have dm\par Additional medical history includes that of alcoholism with resulting hepatic cirrhosis. Has not drank alcohol in about 5 months. Too with hx of GI bleed in November 2019, liver growth, depression, heavy cig smoker \par Has been a smoker for about 30 years. Smokes about a pack a day.\par Met with hepatologist Dr. Johnna Lezama. \par Had both doses of the covid vaccine- denies getting booster. \par \par Is due for hernia surgery with Dr. Mainor Virk.

## 2022-03-16 DIAGNOSIS — R09.89 OTHER SPECIFIED SYMPTOMS AND SIGNS INVOLVING THE CIRCULATORY AND RESPIRATORY SYSTEMS: ICD-10-CM

## 2022-03-17 ENCOUNTER — APPOINTMENT (OUTPATIENT)
Dept: CARDIOLOGY | Facility: CLINIC | Age: 62
End: 2022-03-17
Payer: MEDICAID

## 2022-03-17 PROCEDURE — 93925 LOWER EXTREMITY STUDY: CPT

## 2022-03-17 PROCEDURE — 93978 VASCULAR STUDY: CPT

## 2022-03-17 PROCEDURE — 93880 EXTRACRANIAL BILAT STUDY: CPT

## 2022-03-22 ENCOUNTER — APPOINTMENT (OUTPATIENT)
Dept: INTERNAL MEDICINE | Facility: CLINIC | Age: 62
End: 2022-03-22

## 2022-03-22 VITALS
DIASTOLIC BLOOD PRESSURE: 72 MMHG | HEIGHT: 66 IN | TEMPERATURE: 97.6 F | HEART RATE: 76 BPM | WEIGHT: 147 LBS | SYSTOLIC BLOOD PRESSURE: 122 MMHG | OXYGEN SATURATION: 98 % | BODY MASS INDEX: 23.63 KG/M2

## 2022-05-09 ENCOUNTER — APPOINTMENT (OUTPATIENT)
Dept: HEPATOLOGY | Facility: CLINIC | Age: 62
End: 2022-05-09

## 2022-05-16 ENCOUNTER — APPOINTMENT (OUTPATIENT)
Dept: ENDOCRINOLOGY | Facility: CLINIC | Age: 62
End: 2022-05-16
Payer: MEDICAID

## 2022-05-16 ENCOUNTER — LABORATORY RESULT (OUTPATIENT)
Age: 62
End: 2022-05-16

## 2022-05-16 VITALS
DIASTOLIC BLOOD PRESSURE: 79 MMHG | HEART RATE: 72 BPM | OXYGEN SATURATION: 98 % | SYSTOLIC BLOOD PRESSURE: 122 MMHG | BODY MASS INDEX: 23.78 KG/M2 | TEMPERATURE: 98.5 F | WEIGHT: 148 LBS | HEIGHT: 66 IN

## 2022-05-16 LAB
HBA1C MFR BLD HPLC: 6.8
POCT GLUC: 171
POCT GLUCOSE: 171

## 2022-05-16 PROCEDURE — 99214 OFFICE O/P EST MOD 30 MIN: CPT

## 2022-05-16 PROCEDURE — 83036 HEMOGLOBIN GLYCOSYLATED A1C: CPT | Mod: QW

## 2022-05-16 PROCEDURE — 82962 GLUCOSE BLOOD TEST: CPT

## 2022-05-16 RX ORDER — BLOOD SUGAR DIAGNOSTIC
STRIP MISCELLANEOUS
Qty: 2 | Refills: 3 | Status: ACTIVE | COMMUNITY
Start: 2022-01-25 | End: 1900-01-01

## 2022-05-16 RX ORDER — LORATADINE 5 MG/5 ML
SOLUTION, ORAL ORAL
Qty: 2 | Refills: 3 | Status: ACTIVE | COMMUNITY
Start: 2022-01-25 | End: 1900-01-01

## 2022-05-16 NOTE — HISTORY OF PRESENT ILLNESS
[FreeTextEntry1] : Mr. LILIBETH US is a 61 year old male who returns  with regard to a hx of type 2 dm. He denies any history of retinopathy or nephropathy. With regard to neuropathy,he  denies any neurologic s/s.   For the diabetes, he   is currently taking Alogliptin/Metformin 12.5 /500 mg bid. of skin. He  too denies any podiatric concerns. Ophthalmologic evaluation is up to date. Home glucose monitoring has shown values to be running 120s- 170s in the am. States that 170s are rare.  He  does deny any hypoglycemia or hypoglycemic signs or symptoms.\par \par POCT A1c returned today at 6.8% ; previously 7.3% from 2/24/2022\par POCT glucose returned today at  171  mg/dL \par \par Additional medical history includes that of alcoholism with resulting hepatic cirrhosis. Too with hx of  GI bleed, depression, heavy cig smoker. Patient states he has been sober from alcohol for 8 months. \par \par CMP from January 2022  was wnl with LDL at 93 and Trig at 124.In November of 2021 Total testosterone returned at 463 and  TSH at 1.52 with Vitamin d 25-OH at 24.7\par \par PMD Dr. Elliot Flores

## 2022-05-17 LAB
25(OH)D3 SERPL-MCNC: 33.2 NG/ML
ALBUMIN SERPL ELPH-MCNC: 4.6 G/DL
ALP BLD-CCNC: 142 U/L
ALT SERPL-CCNC: 31 U/L
ANION GAP SERPL CALC-SCNC: 12 MMOL/L
AST SERPL-CCNC: 35 U/L
BASOPHILS # BLD AUTO: 0.02 K/UL
BASOPHILS NFR BLD AUTO: 0.3 %
BILIRUB SERPL-MCNC: 0.3 MG/DL
BUN SERPL-MCNC: 9 MG/DL
CALCIUM SERPL-MCNC: 9.6 MG/DL
CHLORIDE SERPL-SCNC: 101 MMOL/L
CHOLEST SERPL-MCNC: 149 MG/DL
CO2 SERPL-SCNC: 22 MMOL/L
CREAT SERPL-MCNC: 0.63 MG/DL
EGFR: 108 ML/MIN/1.73M2
EOSINOPHIL # BLD AUTO: 0.08 K/UL
EOSINOPHIL NFR BLD AUTO: 1.1 %
ESTIMATED AVERAGE GLUCOSE: 146 MG/DL
FRUCTOSAMINE SERPL-MCNC: 322 UMOL/L
GLUCOSE SERPL-MCNC: 153 MG/DL
GLYCOMARK.: 13.8 UG/ML
HBA1C MFR BLD HPLC: 6.7 %
HCT VFR BLD CALC: 43.5 %
HDLC SERPL-MCNC: 39 MG/DL
HGB BLD-MCNC: 14.2 G/DL
IMM GRANULOCYTES NFR BLD AUTO: 0.1 %
LDLC SERPL CALC-MCNC: 96 MG/DL
LYMPHOCYTES # BLD AUTO: 2.21 K/UL
LYMPHOCYTES NFR BLD AUTO: 29.4 %
MAGNESIUM SERPL-MCNC: 2.2 MG/DL
MAN DIFF?: NORMAL
MCHC RBC-ENTMCNC: 30.7 PG
MCHC RBC-ENTMCNC: 32.6 GM/DL
MCV RBC AUTO: 94 FL
MONOCYTES # BLD AUTO: 0.45 K/UL
MONOCYTES NFR BLD AUTO: 6 %
NEUTROPHILS # BLD AUTO: 4.75 K/UL
NEUTROPHILS NFR BLD AUTO: 63.1 %
NONHDLC SERPL-MCNC: 110 MG/DL
PLATELET # BLD AUTO: 89 K/UL
POTASSIUM SERPL-SCNC: 4.4 MMOL/L
PROT SERPL-MCNC: 8.2 G/DL
RBC # BLD: 4.63 M/UL
RBC # FLD: 12.7 %
SODIUM SERPL-SCNC: 136 MMOL/L
T3FREE SERPL-MCNC: 3.21 PG/ML
T4 FREE SERPL-MCNC: 1.2 NG/DL
TRIGL SERPL-MCNC: 68 MG/DL
TSH SERPL-ACNC: 1.34 UIU/ML
VIT B12 SERPL-MCNC: 805 PG/ML
WBC # FLD AUTO: 7.52 K/UL

## 2022-05-19 ENCOUNTER — APPOINTMENT (OUTPATIENT)
Dept: HEPATOLOGY | Facility: CLINIC | Age: 62
End: 2022-05-19

## 2022-07-28 ENCOUNTER — APPOINTMENT (OUTPATIENT)
Dept: ENDOCRINOLOGY | Facility: CLINIC | Age: 62
End: 2022-07-28

## 2022-07-28 VITALS
RESPIRATION RATE: 15 BRPM | TEMPERATURE: 98.6 F | OXYGEN SATURATION: 98 % | WEIGHT: 154 LBS | DIASTOLIC BLOOD PRESSURE: 68 MMHG | BODY MASS INDEX: 24.86 KG/M2 | HEART RATE: 90 BPM | SYSTOLIC BLOOD PRESSURE: 143 MMHG

## 2022-07-28 LAB
GLUCOSE BLDC GLUCOMTR-MCNC: 146
HBA1C MFR BLD HPLC: 7.6

## 2022-07-28 PROCEDURE — 99214 OFFICE O/P EST MOD 30 MIN: CPT | Mod: 25

## 2022-07-28 PROCEDURE — 82962 GLUCOSE BLOOD TEST: CPT

## 2022-07-28 PROCEDURE — 83036 HEMOGLOBIN GLYCOSYLATED A1C: CPT | Mod: QW

## 2022-07-28 PROCEDURE — 36415 COLL VENOUS BLD VENIPUNCTURE: CPT

## 2022-07-28 RX ORDER — ALOGLIPTIN AND METFORMIN HYDROCHLORIDE 12.5; 1 MG/1; MG/1
12.5-1 TABLET, FILM COATED ORAL TWICE DAILY
Qty: 180 | Refills: 2 | Status: ACTIVE | COMMUNITY
Start: 2022-01-25 | End: 1900-01-01

## 2022-07-28 RX ORDER — ERGOCALCIFEROL 1.25 MG/1
1.25 MG CAPSULE, LIQUID FILLED ORAL
Qty: 12 | Refills: 2 | Status: DISCONTINUED | COMMUNITY
Start: 2021-12-06 | End: 2022-07-28

## 2022-08-01 ENCOUNTER — NON-APPOINTMENT (OUTPATIENT)
Age: 62
End: 2022-08-01

## 2022-08-04 ENCOUNTER — APPOINTMENT (OUTPATIENT)
Dept: HEPATOLOGY | Facility: CLINIC | Age: 62
End: 2022-08-04

## 2022-08-09 ENCOUNTER — APPOINTMENT (OUTPATIENT)
Dept: CARDIOLOGY | Facility: CLINIC | Age: 62
End: 2022-08-09

## 2022-08-09 ENCOUNTER — NON-APPOINTMENT (OUTPATIENT)
Age: 62
End: 2022-08-09

## 2022-08-09 VITALS
OXYGEN SATURATION: 98 % | WEIGHT: 154 LBS | HEART RATE: 73 BPM | SYSTOLIC BLOOD PRESSURE: 122 MMHG | BODY MASS INDEX: 24.75 KG/M2 | DIASTOLIC BLOOD PRESSURE: 72 MMHG | HEIGHT: 66 IN

## 2022-08-09 DIAGNOSIS — Z72.0 TOBACCO USE: ICD-10-CM

## 2022-08-09 PROCEDURE — 99204 OFFICE O/P NEW MOD 45 MIN: CPT

## 2022-08-09 PROCEDURE — 99214 OFFICE O/P EST MOD 30 MIN: CPT | Mod: 25

## 2022-08-09 PROCEDURE — 93000 ELECTROCARDIOGRAM COMPLETE: CPT

## 2022-08-12 NOTE — HISTORY OF PRESENT ILLNESS
[FreeTextEntry1] : 61 yo male with DM and alcoholic hepatic cirrhosis referred by Dr. Siddiqui for cardiac evaluation. \par \par Pt is a current smoker. \par \par The patient follows with Dr. Siddiqui for continued care of diabetes mellitus.  Patient denies any visual changes, blood in the urine, abdominal pain, nausea, vomiting, myalgias, arthralgias, paresthesia’s and syncope. The patient denies any chest discomfort, shortness of breath or new neurologic signs or symptoms. Patient denies any polyuria, worsening nocturia, or polydipsia.\par \par Pt following with Dr. Lezama for cirrhosis. \par \par Denies chest pain, dyspnea or palpitations.\par \par sp labs last week with Dr. Siddiqui LDL 96 a1c 6.7 alk phos 142\par Patient currently smokes about 1 pack/day.\par

## 2022-08-22 ENCOUNTER — APPOINTMENT (OUTPATIENT)
Dept: CARDIOLOGY | Facility: CLINIC | Age: 62
End: 2022-08-22

## 2022-08-22 DIAGNOSIS — I25.10 ATHEROSCLEROTIC HEART DISEASE OF NATIVE CORONARY ARTERY W/OUT ANGINA PECTORIS: ICD-10-CM

## 2022-08-22 DIAGNOSIS — I25.84 ATHEROSCLEROTIC HEART DISEASE OF NATIVE CORONARY ARTERY W/OUT ANGINA PECTORIS: ICD-10-CM

## 2022-08-23 ENCOUNTER — APPOINTMENT (OUTPATIENT)
Dept: CT IMAGING | Facility: CLINIC | Age: 62
End: 2022-08-23

## 2022-08-25 ENCOUNTER — NON-APPOINTMENT (OUTPATIENT)
Age: 62
End: 2022-08-25

## 2022-09-01 ENCOUNTER — APPOINTMENT (OUTPATIENT)
Dept: HEPATOLOGY | Facility: CLINIC | Age: 62
End: 2022-09-01

## 2022-09-01 VITALS
OXYGEN SATURATION: 98 % | HEIGHT: 66 IN | SYSTOLIC BLOOD PRESSURE: 133 MMHG | WEIGHT: 151 LBS | TEMPERATURE: 97.9 F | BODY MASS INDEX: 24.27 KG/M2 | HEART RATE: 79 BPM | DIASTOLIC BLOOD PRESSURE: 81 MMHG

## 2022-09-01 DIAGNOSIS — K76.9 LIVER DISEASE, UNSPECIFIED: ICD-10-CM

## 2022-09-01 DIAGNOSIS — R16.0 HEPATOMEGALY, NOT ELSEWHERE CLASSIFIED: ICD-10-CM

## 2022-09-01 PROCEDURE — 99214 OFFICE O/P EST MOD 30 MIN: CPT

## 2022-09-01 NOTE — PHYSICAL EXAM
[General Appearance - Alert] : alert [General Appearance - In No Acute Distress] : in no acute distress [Sclera] : the sclera and conjunctiva were normal [Outer Ear] : the ears and nose were normal in appearance [Neck Appearance] : the appearance of the neck was normal [Neck Cervical Mass (___cm)] : no neck mass was observed [Respiration, Rhythm And Depth] : normal respiratory rhythm and effort [Auscultation Breath Sounds / Voice Sounds] : lungs were clear to auscultation bilaterally [Heart Rate And Rhythm] : heart rate was normal and rhythm regular [Heart Sounds] : normal S1 and S2 [Edema] : there was no peripheral edema [Bowel Sounds] : normal bowel sounds [Abdomen Soft] : soft [Abdomen Tenderness] : non-tender [] : no hepato-splenomegaly [Abdomen Mass (___ Cm)] : no abdominal mass palpated [No Focal Deficits] : no focal deficits [Oriented To Time, Place, And Person] : oriented to person, place, and time [Scleral Icterus] : No Scleral Icterus [Abdominal  Ascites] : no ascites [Asterixis] : no asterixis observed [Jaundice] : No jaundice [Palmar Erythema] : no Palmar Erythema [FreeTextEntry1] : reducible umbilical hernia

## 2022-09-01 NOTE — HISTORY OF PRESENT ILLNESS
[de-identified] : Mr. 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 \par \par 22 Denies abdominal pain, nausea, vomiting, melena, hematochezia, or hematemesis. Feels well. Has not done EGD/Col yet. Needs dental work, needs clearance for liver. Still trying to get hernia repair and has gone to see endo with A1c now 7.6, on alogliptin-metformin. He is leaving to [place] for 3 weeks b/c brother .\par \par 2/10/22 He currently feels well. Denies abdominal pain, nausea, vomiting, melena, hematochezia, or hematemesis. He did not get BW ordered at his last visit in Dec. He was scheduled for left inguinal hernia and umbilical hernia repair on 1/10/21 with Dr. Virk but surgery was canceled due to A1c >8 and cirrhosis. He reports no pain or active symptoms related to umbilical hernia. 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 He is seeing endocrinologist to better control his T2DM. \par \par He reports drinking few gin a day, everyday for many years, stopped in 2019 for few months after being hospitalized for bleeding peptic ulcer but relapsed. He has been drinking 1 bottle of wine daily but stopped completely in Oct 2021 after being told that he has enlarged liver. States that he's aware of liver cirrhosis.  \par \par Pt was admitted at [facility] for hematemesis and dark stools, at that time he had an EGD that showed nonbleeding large (>5mm) esophageal varices in lower and mid esophagus, as well as localized area of erythematous and edematous mucosa in lesser curvature with ulceration, portal hypertensive gastropathy.  No EGD since 2019. Not on NSBB. \par \par Said he never had a screening colonoscopy. No family history of colon cancer.\par \par -Abdo CT 21 showed 1.1 cm hypodense central right hepatic lesion that is indeterminate (test done w/o contrast) and is stable since 2019. \par -Abdo MRI 22 showed 1.2 cm right hepatic lobe cyst, no ascites, hepatic and portal veins are patent\par \par -BW 22 ALT 16, AST 25, CR 0.66, ALT 16, AST 25, Tbil 0.3, CR 0.66, Na 137, no INR level. \par BW 21, INR not done. ALT 15, AST 22, , CR 0.58, sodium 131. Tbil 0.4 \par \par

## 2022-09-01 NOTE — ASSESSMENT
[FreeTextEntry1] : Mr. 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  Abdo MRI 1/13/22 showed 1.2 cm right hepatic lobe cyst, no ascites, hepatic and portal veins are patent. \par \par # Cirrhosis of the liver\par I discussed the meaning of cirrhosis with the patient. I reviewed the natural history of the disease. I explained the risks for the development of esophageal varices with and without bleeding, hepatic encephalopathy, ascites, hepatocellular carcinoma, and liver failure. I have explained that disease can progress to the point of requiring an evaluation for liver transplantation. I have explained the need for imaging every 6 months to screen for liver cancer.\par \par -MELD, BW ordered again to calculate \par -Ascites:None\par -EV:  EGD on 11/12/19 showed nonbleeding large (>5mm) esophageal varices in lower and mid esophagus, as well as localized area of erythematous and edematous mucosa in lesser curvature with ulceration, portal hypertensive gastropathy.  No EGD since 2019. Not on NSBB. He agrees to have this done with our office. \par -Hepatic encephalopathy: none on exam today \par -HCC: neg on abdo CT 11/24/21 and MRI 1/13/22. Due for HCC screening and abdo US RX given.\par \par #Health maintenance in cirrhosis\par -Will discuss vaccination for hep A and B at next visit\par -Patient was counseled to: abstain from alcohol and all illicit drugs; avoid use of herbal and dietary supplements due to potential hepatotoxicity; limit use of acetaminophen to <2 grams per day; avoid use of nonsteroidal antiinflammatory drugs (NSAIDs) as these can lead to diuretic resistance and precipitate renal dysfunction in patients with advanced liver disease; avoid eating any unpasteurized dairy products; avoid eating any raw or undercooked eggs, fish, poultry, or meat; and avoid eating raw/steamed oysters or other shellfish to avoid risk of Vibrio infection.\par -Need repeat EGD to screen for esophageal varices. \par -Reports never had colonoscopy in the past, recommended one to screen for colon cancer at the same time as EGD. \par \par BW. EGD/colonoscopy, F/U in 6 month. \par \par Johnna Lezama, ANP-BC\par [facility] \par [facility]\par Tel: [phone]\par \par

## 2022-09-02 ENCOUNTER — NON-APPOINTMENT (OUTPATIENT)
Age: 62
End: 2022-09-02

## 2022-09-02 LAB
AFP-TM SERPL-MCNC: 5 NG/ML
ALBUMIN SERPL ELPH-MCNC: 4.7 G/DL
ALP BLD-CCNC: 117 U/L
ALT SERPL-CCNC: 24 U/L
ANION GAP SERPL CALC-SCNC: 15 MMOL/L
AST SERPL-CCNC: 26 U/L
BASOPHILS # BLD AUTO: 0.04 K/UL
BASOPHILS NFR BLD AUTO: 0.4 %
BILIRUB SERPL-MCNC: 0.4 MG/DL
BUN SERPL-MCNC: 11 MG/DL
CALCIUM SERPL-MCNC: 9.7 MG/DL
CHLORIDE SERPL-SCNC: 97 MMOL/L
CO2 SERPL-SCNC: 23 MMOL/L
CREAT SERPL-MCNC: 0.73 MG/DL
EGFR: 102 ML/MIN/1.73M2
EOSINOPHIL # BLD AUTO: 0.14 K/UL
EOSINOPHIL NFR BLD AUTO: 1.5 %
FOLATE SERPL-MCNC: 7.8 NG/ML
HCT VFR BLD CALC: 43.1 %
HGB BLD-MCNC: 14.2 G/DL
IMM GRANULOCYTES NFR BLD AUTO: 0.2 %
INR PPP: 1.18 RATIO
LYMPHOCYTES # BLD AUTO: 2.36 K/UL
LYMPHOCYTES NFR BLD AUTO: 25.8 %
MAN DIFF?: NORMAL
MCHC RBC-ENTMCNC: 30.8 PG
MCHC RBC-ENTMCNC: 32.9 GM/DL
MCV RBC AUTO: 93.5 FL
MONOCYTES # BLD AUTO: 0.61 K/UL
MONOCYTES NFR BLD AUTO: 6.7 %
NEUTROPHILS # BLD AUTO: 5.99 K/UL
NEUTROPHILS NFR BLD AUTO: 65.4 %
PLATELET # BLD AUTO: 116 K/UL
POTASSIUM SERPL-SCNC: 4.5 MMOL/L
PROT SERPL-MCNC: 7.6 G/DL
PT BLD: 13.8 SEC
RBC # BLD: 4.61 M/UL
RBC # FLD: 12.7 %
SODIUM SERPL-SCNC: 135 MMOL/L
VIT B12 SERPL-MCNC: 940 PG/ML
WBC # FLD AUTO: 9.16 K/UL

## 2022-09-13 LAB — PHOSPHATIDYETHANOL (PETH), WHOLE BLOOD: NEGATIVE NG/ML

## 2022-09-14 ENCOUNTER — APPOINTMENT (OUTPATIENT)
Dept: CARDIOLOGY | Facility: CLINIC | Age: 62
End: 2022-09-14

## 2022-09-28 ENCOUNTER — APPOINTMENT (OUTPATIENT)
Dept: ENDOCRINOLOGY | Facility: CLINIC | Age: 62
End: 2022-09-28

## 2022-09-28 NOTE — HISTORY OF PRESENT ILLNESS
[FreeTextEntry1] : Mr. LILIBETH US is a 62 year old male who returns  with regard to a hx of type 2 dm. He denies any history of retiopathy or nephropathy. With regard to neuropathy,he  denies any neurologic s/s. \par \par Pending hernia surgery  needed A1cx lower from [place] when was upper 8's.\par \par   For the diabetes, he   is currently taking Alogliptin/Metformin 12.5 /500 mg bid. He  too denies any podiatric concerns. Ophthalmologic evaluation is NOT up to date, he will schedule follow up. \par \par  Home glucose monitoring has shown values to be running -160, typically 140-160.  He does deny any hypoglycemia or hypoglycemic signs or symptoms.\par Notes he eats sugar free icecream every night. Also noted marital stress. skips breakfast. lunch: nuts, biscuit, oranges. Dinner:fish, vegetables, green banana.  Avoids juices, sodas, bagels, pasta. Eats late night snack of mixed nuts. \par \par \par POCT A1C returned today  7.6%  ; previously 6.7  % on 5.16.22\par POCT glucose returned today at 148   mg/dl\par \par Additional medical history includes that of alcoholism with resulting hepatic cirrhosis. Too with hx of  GI bleed, depression, heavy cig smoker smokes 1 ppd \par Patient has been sober for the last 11 months. \par \par  Retinoid Dermatitis Normal Treatment: I recommended more frequent application of Cetaphil or CeraVe to the areas of dermatitis.

## 2022-09-29 ENCOUNTER — APPOINTMENT (OUTPATIENT)
Dept: INTERNAL MEDICINE | Facility: CLINIC | Age: 62
End: 2022-09-29

## 2022-09-29 PROCEDURE — 99213 OFFICE O/P EST LOW 20 MIN: CPT | Mod: 95

## 2022-09-29 NOTE — REVIEW OF SYSTEMS
[Nasal Discharge] : nasal discharge [Cough] : cough [Negative] : Constitutional [Postnasal Drip] : no postnasal drip [Sore Throat] : no sore throat

## 2022-09-29 NOTE — HISTORY OF PRESENT ILLNESS
[Home] : at home, [unfilled] , at the time of the visit. [Medical Office: ([facility])___] : at the medical office located in  [Verbal consent obtained from patient] : the patient, [unfilled] [FreeTextEntry8] : CC: Cough\par \par Complaining of productive cough x 2 weeks w/ nasal congestion.  \par +smoker\par Denies fever, chills, or LLAMAS

## 2022-10-04 ENCOUNTER — NON-APPOINTMENT (OUTPATIENT)
Age: 62
End: 2022-10-04

## 2022-10-04 ENCOUNTER — APPOINTMENT (OUTPATIENT)
Dept: OPHTHALMOLOGY | Facility: CLINIC | Age: 62
End: 2022-10-04

## 2022-10-04 PROCEDURE — 92004 COMPRE OPH EXAM NEW PT 1/>: CPT

## 2022-10-06 ENCOUNTER — APPOINTMENT (OUTPATIENT)
Dept: SURGERY | Facility: CLINIC | Age: 62
End: 2022-10-06

## 2022-10-06 VITALS
HEART RATE: 81 BPM | SYSTOLIC BLOOD PRESSURE: 162 MMHG | DIASTOLIC BLOOD PRESSURE: 73 MMHG | WEIGHT: 150 LBS | HEIGHT: 66 IN | RESPIRATION RATE: 16 BRPM | BODY MASS INDEX: 24.11 KG/M2 | OXYGEN SATURATION: 98 % | TEMPERATURE: 97.2 F

## 2022-10-06 DIAGNOSIS — K40.90 UNILATERAL INGUINAL HERNIA, W/OUT OBSTRUCTION OR GANGRENE, NOT SPECIFIED AS RECURRENT: ICD-10-CM

## 2022-10-06 PROCEDURE — 99213 OFFICE O/P EST LOW 20 MIN: CPT

## 2022-10-06 RX ORDER — SODIUM SULFATE, POTASSIUM SULFATE, MAGNESIUM SULFATE 17.5; 3.13; 1.6 G/ML; G/ML; G/ML
17.5-3.13-1.6 SOLUTION, CONCENTRATE ORAL
Qty: 1 | Refills: 0 | Status: DISCONTINUED | COMMUNITY
Start: 2022-02-10 | End: 2022-10-06

## 2022-10-06 RX ORDER — AZITHROMYCIN 250 MG/1
250 TABLET, FILM COATED ORAL
Qty: 1 | Refills: 0 | Status: DISCONTINUED | COMMUNITY
Start: 2022-09-29 | End: 2022-10-06

## 2022-10-06 RX ORDER — BENZONATATE 100 MG/1
100 CAPSULE ORAL 3 TIMES DAILY
Qty: 21 | Refills: 0 | Status: DISCONTINUED | COMMUNITY
Start: 2022-09-29 | End: 2022-10-06

## 2022-10-06 NOTE — HISTORY OF PRESENT ILLNESS
[de-identified] : Matthew is a 62 year old male here for a follow up visit. He was last seen on 11/23/21 for LI. The patient's A1c is greater than 8. He never had an INR with preadmission testing. In view of his significant cirrhosis and varices he is not optimized for surgery at this time. Surgery has been canceled. I have spoken to the patient and he will get in touch with his primary care doctor to get his glucose control better and we will get preop INR is prior to any surgery. \par Today patient feeling well. Reports left inguinal bulge with occasional pain. Regular BM, no constipation. No nausea, no vomiting. Patient wants to discuss about surgery.

## 2022-10-06 NOTE — ASSESSMENT
[FreeTextEntry1] : Now that the patient's liver issues are better controlled along with his diabetes we will reschedule him for a left inguinal hernia repair.

## 2022-10-11 ENCOUNTER — APPOINTMENT (OUTPATIENT)
Dept: CARDIOLOGY | Facility: CLINIC | Age: 62
End: 2022-10-11

## 2022-10-11 ENCOUNTER — NON-APPOINTMENT (OUTPATIENT)
Age: 62
End: 2022-10-11

## 2022-10-11 VITALS
HEART RATE: 72 BPM | HEIGHT: 66 IN | BODY MASS INDEX: 24.11 KG/M2 | WEIGHT: 150 LBS | DIASTOLIC BLOOD PRESSURE: 72 MMHG | SYSTOLIC BLOOD PRESSURE: 124 MMHG | OXYGEN SATURATION: 99 %

## 2022-10-11 DIAGNOSIS — R94.31 ABNORMAL ELECTROCARDIOGRAM [ECG] [EKG]: ICD-10-CM

## 2022-10-11 DIAGNOSIS — R06.09 OTHER FORMS OF DYSPNEA: ICD-10-CM

## 2022-10-11 PROCEDURE — 99214 OFFICE O/P EST MOD 30 MIN: CPT | Mod: 25

## 2022-10-11 PROCEDURE — 93306 TTE W/DOPPLER COMPLETE: CPT

## 2022-10-11 PROCEDURE — 93000 ELECTROCARDIOGRAM COMPLETE: CPT

## 2022-10-12 ENCOUNTER — OUTPATIENT (OUTPATIENT)
Dept: OUTPATIENT SERVICES | Facility: HOSPITAL | Age: 62
LOS: 1 days | End: 2022-10-12
Payer: SELF-PAY

## 2022-10-12 ENCOUNTER — APPOINTMENT (OUTPATIENT)
Dept: CT IMAGING | Facility: CLINIC | Age: 62
End: 2022-10-12

## 2022-10-12 DIAGNOSIS — Z00.8 ENCOUNTER FOR OTHER GENERAL EXAMINATION: ICD-10-CM

## 2022-10-12 DIAGNOSIS — F17.210 NICOTINE DEPENDENCE, CIGARETTES, UNCOMPLICATED: ICD-10-CM

## 2022-10-12 DIAGNOSIS — Z98.890 OTHER SPECIFIED POSTPROCEDURAL STATES: Chronic | ICD-10-CM

## 2022-10-12 DIAGNOSIS — Z90.49 ACQUIRED ABSENCE OF OTHER SPECIFIED PARTS OF DIGESTIVE TRACT: Chronic | ICD-10-CM

## 2022-10-12 DIAGNOSIS — K74.60 UNSPECIFIED CIRRHOSIS OF LIVER: ICD-10-CM

## 2022-10-12 PROBLEM — R06.09 DYSPNEA ON EXERTION: Status: ACTIVE | Noted: 2022-10-12

## 2022-10-12 PROBLEM — R94.31 ABNORMAL EKG: Status: ACTIVE | Noted: 2022-10-12

## 2022-10-12 PROCEDURE — 75571 CT HRT W/O DYE W/CA TEST: CPT

## 2022-10-12 PROCEDURE — 75571 CT HRT W/O DYE W/CA TEST: CPT | Mod: 26

## 2022-10-12 NOTE — HISTORY OF PRESENT ILLNESS
[FreeTextEntry1] : Matthew is a 62 y.o M w/PMHx of DM2, Liver Cirrhosis, etOH dependence, Smoker who presents for f/u. Brother did recently die d/t Kidney disease while in [place]. Was Rx US Carotids, CT Heart Calcium score at last visit but was not performed. s/p TTE today. Seeing Dr. Virk for L inguinal hernia repair. Did f/u hepatologist Dr. Lezama, recommended etOH abstinence, repeat EGD for esophageal varice screening, colonoscopy. The patient presents today with complaints of occasional episodes of dyspnea that occur with rest and and exertion there's no regular  discernible pattern the patient does not feel it is associated with anxiety patient denies substernal chest pain, PND, with orthopnea and palpitations\par \par

## 2022-10-12 NOTE — PHYSICAL EXAM
[Well Developed] : well developed [Well Nourished] : well nourished [No Acute Distress] : no acute distress [Normal Conjunctiva] : normal conjunctiva [Normal Venous Pressure] : normal venous pressure [No Carotid Bruit] : no carotid bruit [Clear Lung Fields] : clear lung fields [Good Air Entry] : good air entry [No Respiratory Distress] : no respiratory distress  [Soft] : abdomen soft [Non Tender] : non-tender [No Masses/organomegaly] : no masses/organomegaly [Normal Bowel Sounds] : normal bowel sounds [Normal Gait] : normal gait [No Edema] : no edema [No Cyanosis] : no cyanosis [No Clubbing] : no clubbing [No Varicosities] : no varicosities [No Rash] : no rash [No Skin Lesions] : no skin lesions [Moves all extremities] : moves all extremities [No Focal Deficits] : no focal deficits [Normal Speech] : normal speech [Alert and Oriented] : alert and oriented [Normal memory] : normal memory [de-identified] : Regular rate and rhythm, NL S1, S2, non-displaced PMI, chest non-tender; no rubs,heaves  or gallops a  Grade 2/6 systolic murmur noted at the LSB

## 2022-10-31 ENCOUNTER — NON-APPOINTMENT (OUTPATIENT)
Age: 62
End: 2022-10-31

## 2022-11-04 ENCOUNTER — OUTPATIENT (OUTPATIENT)
Dept: OUTPATIENT SERVICES | Facility: HOSPITAL | Age: 62
LOS: 1 days | End: 2022-11-04
Payer: MEDICAID

## 2022-11-04 VITALS
SYSTOLIC BLOOD PRESSURE: 144 MMHG | DIASTOLIC BLOOD PRESSURE: 83 MMHG | OXYGEN SATURATION: 100 % | HEART RATE: 73 BPM | HEIGHT: 66 IN | RESPIRATION RATE: 16 BRPM | WEIGHT: 143.96 LBS | TEMPERATURE: 98 F

## 2022-11-04 DIAGNOSIS — Z90.49 ACQUIRED ABSENCE OF OTHER SPECIFIED PARTS OF DIGESTIVE TRACT: Chronic | ICD-10-CM

## 2022-11-04 DIAGNOSIS — K40.90 UNILATERAL INGUINAL HERNIA, WITHOUT OBSTRUCTION OR GANGRENE, NOT SPECIFIED AS RECURRENT: ICD-10-CM

## 2022-11-04 DIAGNOSIS — Z01.818 ENCOUNTER FOR OTHER PREPROCEDURAL EXAMINATION: ICD-10-CM

## 2022-11-04 DIAGNOSIS — E11.9 TYPE 2 DIABETES MELLITUS WITHOUT COMPLICATIONS: ICD-10-CM

## 2022-11-04 DIAGNOSIS — Z98.890 OTHER SPECIFIED POSTPROCEDURAL STATES: Chronic | ICD-10-CM

## 2022-11-04 LAB
ALBUMIN SERPL ELPH-MCNC: 4.4 G/DL — SIGNIFICANT CHANGE UP (ref 3.3–5)
ALP SERPL-CCNC: 108 U/L — SIGNIFICANT CHANGE UP (ref 40–120)
ALT FLD-CCNC: 24 U/L — SIGNIFICANT CHANGE UP (ref 10–45)
ANION GAP SERPL CALC-SCNC: 12 MMOL/L — SIGNIFICANT CHANGE UP (ref 5–17)
AST SERPL-CCNC: 30 U/L — SIGNIFICANT CHANGE UP (ref 10–40)
BILIRUB SERPL-MCNC: 0.4 MG/DL — SIGNIFICANT CHANGE UP (ref 0.2–1.2)
BUN SERPL-MCNC: 9 MG/DL — SIGNIFICANT CHANGE UP (ref 7–23)
CALCIUM SERPL-MCNC: 9.7 MG/DL — SIGNIFICANT CHANGE UP (ref 8.4–10.5)
CHLORIDE SERPL-SCNC: 96 MMOL/L — SIGNIFICANT CHANGE UP (ref 96–108)
CO2 SERPL-SCNC: 23 MMOL/L — SIGNIFICANT CHANGE UP (ref 22–31)
CREAT SERPL-MCNC: 0.68 MG/DL — SIGNIFICANT CHANGE UP (ref 0.5–1.3)
EGFR: 104 ML/MIN/1.73M2 — SIGNIFICANT CHANGE UP
GLUCOSE SERPL-MCNC: 83 MG/DL — SIGNIFICANT CHANGE UP (ref 70–99)
HCT VFR BLD CALC: 37.9 % — LOW (ref 39–50)
HGB BLD-MCNC: 13.1 G/DL — SIGNIFICANT CHANGE UP (ref 13–17)
MCHC RBC-ENTMCNC: 31 PG — SIGNIFICANT CHANGE UP (ref 27–34)
MCHC RBC-ENTMCNC: 34.6 GM/DL — SIGNIFICANT CHANGE UP (ref 32–36)
MCV RBC AUTO: 89.6 FL — SIGNIFICANT CHANGE UP (ref 80–100)
NRBC # BLD: 0 /100 WBCS — SIGNIFICANT CHANGE UP (ref 0–0)
PLATELET # BLD AUTO: 117 K/UL — LOW (ref 150–400)
POTASSIUM SERPL-MCNC: 3.6 MMOL/L — SIGNIFICANT CHANGE UP (ref 3.5–5.3)
POTASSIUM SERPL-SCNC: 3.6 MMOL/L — SIGNIFICANT CHANGE UP (ref 3.5–5.3)
PROT SERPL-MCNC: 8 G/DL — SIGNIFICANT CHANGE UP (ref 6–8.3)
RBC # BLD: 4.23 M/UL — SIGNIFICANT CHANGE UP (ref 4.2–5.8)
RBC # FLD: 13.2 % — SIGNIFICANT CHANGE UP (ref 10.3–14.5)
SODIUM SERPL-SCNC: 131 MMOL/L — LOW (ref 135–145)
WBC # BLD: 8.76 K/UL — SIGNIFICANT CHANGE UP (ref 3.8–10.5)
WBC # FLD AUTO: 8.76 K/UL — SIGNIFICANT CHANGE UP (ref 3.8–10.5)

## 2022-11-04 PROCEDURE — 80053 COMPREHEN METABOLIC PANEL: CPT

## 2022-11-04 PROCEDURE — G0463: CPT

## 2022-11-04 PROCEDURE — 83036 HEMOGLOBIN GLYCOSYLATED A1C: CPT

## 2022-11-04 PROCEDURE — 85027 COMPLETE CBC AUTOMATED: CPT

## 2022-11-04 RX ORDER — LIDOCAINE HCL 20 MG/ML
0.2 VIAL (ML) INJECTION ONCE
Refills: 0 | Status: DISCONTINUED | OUTPATIENT
Start: 2022-11-21 | End: 2022-12-05

## 2022-11-04 RX ORDER — CHLORHEXIDINE GLUCONATE 213 G/1000ML
1 SOLUTION TOPICAL ONCE
Refills: 0 | Status: DISCONTINUED | OUTPATIENT
Start: 2022-11-21 | End: 2022-12-05

## 2022-11-04 RX ORDER — CHOLECALCIFEROL (VITAMIN D3) 125 MCG
1 CAPSULE ORAL
Qty: 0 | Refills: 0 | DISCHARGE

## 2022-11-04 NOTE — H&P PST ADULT - HISTORY OF PRESENT ILLNESS
62 yr old male with history of compensated liver cirrhosis presumed secondary to alcohol ( quit 9/10/2021) , DM2 (most recent hgA1c 7.6 % 7/28/2022), left inguinal hernia and umbilical hernia complaining of discomfort. 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 Presents to PST for scheduled left inguinal hernia repair and umbilical hernia repair on 11/21/2022. Denies fever, chills, no acute complaints. Denies Covid exposure. Covid PCR 11/18 @ [facility].     Procedure initially was scheduled for 1/2022, but canceled due to HgA1c > 8.5.

## 2022-11-04 NOTE — H&P PST ADULT - NSICDXPASTMEDICALHX_GEN_ALL_CORE_FT
PAST MEDICAL HISTORY:  Alcohol abuse quit  9/10/2021    Current smoker     Emphysema lung smoker , denies exacerbations    History of cirrhosis ETOH    History of GI bleed 2019 s/p blood transfusion    Left inguinal hernia     Type 2 diabetes mellitus 2020 diet controlled, started on PO  medications January 2022  , most recent HgA1c 7.6 % ( 7/28/2022 )    Umbilical hernia

## 2022-11-04 NOTE — H&P PST ADULT - PRIMARY CARE PROVIDER
Dr. Herminia Flores [phone] ( [facility] ) notes in [employer] , last visit 9/22/2022

## 2022-11-04 NOTE — H&P PST ADULT - OTHER CARE PROVIDERS
GI Dr. Johnna Lezama last visit 9/2022, Endo Dr. Adolfo Siddiqui last visit 2/2022, cardiologist Dr. Jez Fleming has preop eval on 11/15/2022

## 2022-11-04 NOTE — H&P PST ADULT - PROBLEM SELECTOR PLAN 1
left inguinal hernia repair   umbilical hernia repair   PST instructions provided, surgical scrub given, patient verbalized understanding  CBC,  CMP, HgA1c collected and sent   Covid PCR 11/18/2022 Fernando courtney

## 2022-11-05 LAB
A1C WITH ESTIMATED AVERAGE GLUCOSE RESULT: 6.7 % — HIGH (ref 4–5.6)
ESTIMATED AVERAGE GLUCOSE: 146 MG/DL — HIGH (ref 68–114)

## 2022-11-15 ENCOUNTER — APPOINTMENT (OUTPATIENT)
Dept: CARDIOLOGY | Facility: CLINIC | Age: 62
End: 2022-11-15

## 2022-11-15 ENCOUNTER — NON-APPOINTMENT (OUTPATIENT)
Age: 62
End: 2022-11-15

## 2022-11-15 VITALS
HEIGHT: 66 IN | OXYGEN SATURATION: 98 % | SYSTOLIC BLOOD PRESSURE: 110 MMHG | DIASTOLIC BLOOD PRESSURE: 64 MMHG | WEIGHT: 150 LBS | BODY MASS INDEX: 24.11 KG/M2 | HEART RATE: 78 BPM

## 2022-11-15 DIAGNOSIS — I51.7 CARDIOMEGALY: ICD-10-CM

## 2022-11-15 PROBLEM — Z87.19 PERSONAL HISTORY OF OTHER DISEASES OF THE DIGESTIVE SYSTEM: Chronic | Status: ACTIVE | Noted: 2021-12-30

## 2022-11-15 PROBLEM — F17.200 NICOTINE DEPENDENCE, UNSPECIFIED, UNCOMPLICATED: Chronic | Status: ACTIVE | Noted: 2022-11-04

## 2022-11-15 PROBLEM — K42.9 UMBILICAL HERNIA WITHOUT OBSTRUCTION OR GANGRENE: Chronic | Status: ACTIVE | Noted: 2022-11-04

## 2022-11-15 PROBLEM — F10.10 ALCOHOL ABUSE, UNCOMPLICATED: Chronic | Status: ACTIVE | Noted: 2019-11-13

## 2022-11-15 PROBLEM — J43.9 EMPHYSEMA, UNSPECIFIED: Chronic | Status: ACTIVE | Noted: 2021-12-30

## 2022-11-15 PROBLEM — E11.9 TYPE 2 DIABETES MELLITUS WITHOUT COMPLICATIONS: Chronic | Status: ACTIVE | Noted: 2021-12-30

## 2022-11-15 PROBLEM — K40.90 UNILATERAL INGUINAL HERNIA, WITHOUT OBSTRUCTION OR GANGRENE, NOT SPECIFIED AS RECURRENT: Chronic | Status: ACTIVE | Noted: 2022-11-04

## 2022-11-15 PROCEDURE — 93000 ELECTROCARDIOGRAM COMPLETE: CPT

## 2022-11-15 PROCEDURE — 99214 OFFICE O/P EST MOD 30 MIN: CPT | Mod: 25

## 2022-11-15 NOTE — HISTORY OF PRESENT ILLNESS
[FreeTextEntry1] : I was asked to see this delightful patient today for Pre-op Evaluation  prior to  left inguinal hernia repair  with   Dr. Virk  at fax number   _______  .  The patient denies fever, cough, wheezing, sputum production, hemoptysis, dyspnea, congestion, diarrhea, constipation, nausea, vomiting, bright red blood per rectum, melena, angina, chest pain, palpitations, diaphoresis, PND, incontinence, frequency, urgency, dysuria, edema, joint pain, headache, weakness, numbness and dizziness. The date of the planned procedure is: 11/21/2022\par  \par CAC 91\par \par The patient is here for follow-up of elevated cholesterol. Patient is currently tolerating medication and denies muscle pain, joint pain, back pain,  urinary changes , nausea, vomiting, abdominal pain or diarrhea. The patient is trying to follow a low cholesterol diet.

## 2022-11-16 ENCOUNTER — NON-APPOINTMENT (OUTPATIENT)
Age: 62
End: 2022-11-16

## 2022-11-16 ENCOUNTER — APPOINTMENT (OUTPATIENT)
Dept: CARDIOLOGY | Facility: CLINIC | Age: 62
End: 2022-11-16

## 2022-11-16 PROCEDURE — 93015 CV STRESS TEST SUPVJ I&R: CPT

## 2022-11-17 ENCOUNTER — APPOINTMENT (OUTPATIENT)
Dept: FAMILY MEDICINE | Facility: CLINIC | Age: 62
End: 2022-11-17

## 2022-11-17 VITALS
BODY MASS INDEX: 23.3 KG/M2 | HEIGHT: 66 IN | HEART RATE: 76 BPM | TEMPERATURE: 97.9 F | SYSTOLIC BLOOD PRESSURE: 118 MMHG | DIASTOLIC BLOOD PRESSURE: 70 MMHG | WEIGHT: 145 LBS | OXYGEN SATURATION: 99 %

## 2022-11-17 PROCEDURE — 99215 OFFICE O/P EST HI 40 MIN: CPT | Mod: 25

## 2022-11-17 PROCEDURE — 36415 COLL VENOUS BLD VENIPUNCTURE: CPT

## 2022-11-18 ENCOUNTER — OUTPATIENT (OUTPATIENT)
Dept: OUTPATIENT SERVICES | Facility: HOSPITAL | Age: 62
LOS: 1 days | End: 2022-11-18
Payer: SELF-PAY

## 2022-11-18 DIAGNOSIS — Z98.890 OTHER SPECIFIED POSTPROCEDURAL STATES: Chronic | ICD-10-CM

## 2022-11-18 DIAGNOSIS — Z11.52 ENCOUNTER FOR SCREENING FOR COVID-19: ICD-10-CM

## 2022-11-18 DIAGNOSIS — Z90.49 ACQUIRED ABSENCE OF OTHER SPECIFIED PARTS OF DIGESTIVE TRACT: Chronic | ICD-10-CM

## 2022-11-18 LAB
ALBUMIN SERPL ELPH-MCNC: 4.5 G/DL
ALP BLD-CCNC: 110 U/L
ALT SERPL-CCNC: 24 U/L
ANION GAP SERPL CALC-SCNC: 10 MMOL/L
ANION GAP SERPL CALC-SCNC: 12 MMOL/L
AST SERPL-CCNC: 33 U/L
BASOPHILS # BLD AUTO: 0.04 K/UL
BASOPHILS NFR BLD AUTO: 0.4 %
BILIRUB DIRECT SERPL-MCNC: 0.1 MG/DL
BILIRUB INDIRECT SERPL-MCNC: 0.3 MG/DL
BILIRUB SERPL-MCNC: 0.4 MG/DL
BUN SERPL-MCNC: 12 MG/DL
CALCIUM SERPL-MCNC: 9.6 MG/DL
CHLORIDE SERPL-SCNC: 94 MMOL/L
CHLORIDE SERPL-SCNC: 96 MMOL/L
CHOLEST SERPL-MCNC: 134 MG/DL
CK SERPL-CCNC: 328 U/L
CO2 SERPL-SCNC: 23 MMOL/L
CO2 SERPL-SCNC: 26 MMOL/L
CREAT SERPL-MCNC: 0.67 MG/DL
EGFR: 105 ML/MIN/1.73M2
EOSINOPHIL # BLD AUTO: 0.14 K/UL
EOSINOPHIL NFR BLD AUTO: 1.4 %
ESTIMATED AVERAGE GLUCOSE: 131 MG/DL
GLUCOSE SERPL-MCNC: 92 MG/DL
HBA1C MFR BLD HPLC: 6.2 %
HCT VFR BLD CALC: 40.5 %
HDLC SERPL-MCNC: 42 MG/DL
HGB BLD-MCNC: 13.5 G/DL
IMM GRANULOCYTES NFR BLD AUTO: 0.2 %
LDLC SERPL CALC-MCNC: 68 MG/DL
LDLC SERPL DIRECT ASSAY-MCNC: 75 MG/DL
LYMPHOCYTES # BLD AUTO: 3.87 K/UL
LYMPHOCYTES NFR BLD AUTO: 39.3 %
MAN DIFF?: NORMAL
MCHC RBC-ENTMCNC: 30.9 PG
MCHC RBC-ENTMCNC: 33.3 GM/DL
MCV RBC AUTO: 92.7 FL
MONOCYTES # BLD AUTO: 0.71 K/UL
MONOCYTES NFR BLD AUTO: 7.2 %
NEUTROPHILS # BLD AUTO: 5.06 K/UL
NEUTROPHILS NFR BLD AUTO: 51.5 %
NONHDLC SERPL-MCNC: 91 MG/DL
PLATELET # BLD AUTO: 131 K/UL
POTASSIUM SERPL-SCNC: 3.8 MMOL/L
POTASSIUM SERPL-SCNC: 4.1 MMOL/L
PROT SERPL-MCNC: 7.9 G/DL
RBC # BLD: 4.37 M/UL
RBC # FLD: 12.9 %
SARS-COV-2 RNA SPEC QL NAA+PROBE: SIGNIFICANT CHANGE UP
SODIUM SERPL-SCNC: 128 MMOL/L
SODIUM SERPL-SCNC: 132 MMOL/L
TRIGL SERPL-MCNC: 118 MG/DL
WBC # FLD AUTO: 9.84 K/UL

## 2022-11-18 PROCEDURE — U0005: CPT

## 2022-11-18 PROCEDURE — U0003: CPT

## 2022-11-18 PROCEDURE — C9803: CPT

## 2022-11-20 ENCOUNTER — TRANSCRIPTION ENCOUNTER (OUTPATIENT)
Age: 62
End: 2022-11-20

## 2022-11-21 ENCOUNTER — APPOINTMENT (OUTPATIENT)
Dept: SURGERY | Facility: HOSPITAL | Age: 62
End: 2022-11-21

## 2022-11-21 ENCOUNTER — OUTPATIENT (OUTPATIENT)
Dept: INPATIENT UNIT | Facility: HOSPITAL | Age: 62
LOS: 1 days | End: 2022-11-21
Payer: MEDICAID

## 2022-11-21 ENCOUNTER — TRANSCRIPTION ENCOUNTER (OUTPATIENT)
Age: 62
End: 2022-11-21

## 2022-11-21 ENCOUNTER — RESULT REVIEW (OUTPATIENT)
Age: 62
End: 2022-11-21

## 2022-11-21 VITALS
OXYGEN SATURATION: 100 % | HEART RATE: 70 BPM | SYSTOLIC BLOOD PRESSURE: 129 MMHG | HEIGHT: 65.98 IN | RESPIRATION RATE: 18 BRPM | TEMPERATURE: 98 F | DIASTOLIC BLOOD PRESSURE: 75 MMHG | WEIGHT: 139.99 LBS

## 2022-11-21 VITALS
RESPIRATION RATE: 16 BRPM | OXYGEN SATURATION: 98 % | HEART RATE: 78 BPM | SYSTOLIC BLOOD PRESSURE: 131 MMHG | DIASTOLIC BLOOD PRESSURE: 74 MMHG | TEMPERATURE: 97 F

## 2022-11-21 DIAGNOSIS — Z98.890 OTHER SPECIFIED POSTPROCEDURAL STATES: Chronic | ICD-10-CM

## 2022-11-21 DIAGNOSIS — Z90.49 ACQUIRED ABSENCE OF OTHER SPECIFIED PARTS OF DIGESTIVE TRACT: Chronic | ICD-10-CM

## 2022-11-21 DIAGNOSIS — K40.90 UNILATERAL INGUINAL HERNIA, WITHOUT OBSTRUCTION OR GANGRENE, NOT SPECIFIED AS RECURRENT: ICD-10-CM

## 2022-11-21 LAB
GLUCOSE BLDC GLUCOMTR-MCNC: 104 MG/DL — HIGH (ref 70–99)
GLUCOSE BLDC GLUCOMTR-MCNC: 152 MG/DL — HIGH (ref 70–99)

## 2022-11-21 PROCEDURE — 82962 GLUCOSE BLOOD TEST: CPT

## 2022-11-21 PROCEDURE — 88302 TISSUE EXAM BY PATHOLOGIST: CPT

## 2022-11-21 PROCEDURE — C1781: CPT

## 2022-11-21 PROCEDURE — C9399: CPT

## 2022-11-21 PROCEDURE — 88302 TISSUE EXAM BY PATHOLOGIST: CPT | Mod: 26

## 2022-11-21 PROCEDURE — 49505 PRP I/HERN INIT REDUC >5 YR: CPT | Mod: LT

## 2022-11-21 PROCEDURE — C1889: CPT

## 2022-11-21 DEVICE — SURGICEL POWDER 3 GRAMS: Type: IMPLANTABLE DEVICE | Site: LEFT | Status: FUNCTIONAL

## 2022-11-21 DEVICE — MESH HERNIA PARIETEX PROGRIP 12 X 8CM LEFT: Type: IMPLANTABLE DEVICE | Site: LEFT | Status: FUNCTIONAL

## 2022-11-21 RX ORDER — ONDANSETRON 8 MG/1
4 TABLET, FILM COATED ORAL ONCE
Refills: 0 | Status: DISCONTINUED | OUTPATIENT
Start: 2022-11-21 | End: 2022-11-21

## 2022-11-21 RX ORDER — CHOLECALCIFEROL (VITAMIN D3) 125 MCG
1 CAPSULE ORAL
Qty: 0 | Refills: 0 | DISCHARGE

## 2022-11-21 RX ORDER — OXYCODONE HYDROCHLORIDE 5 MG/1
1 TABLET ORAL
Qty: 12 | Refills: 0
Start: 2022-11-21

## 2022-11-21 RX ORDER — ALOGLIPTIN AND METFORMIN HYDROCHLORIDE 12.5; 5 MG/1; MG/1
1 TABLET, FILM COATED ORAL
Qty: 0 | Refills: 0 | DISCHARGE

## 2022-11-21 RX ORDER — SODIUM CHLORIDE 9 MG/ML
3 INJECTION INTRAMUSCULAR; INTRAVENOUS; SUBCUTANEOUS EVERY 8 HOURS
Refills: 0 | Status: DISCONTINUED | OUTPATIENT
Start: 2022-11-21 | End: 2022-11-21

## 2022-11-21 RX ORDER — INFLUENZA VIRUS VACCINE 15; 15; 15; 15 UG/.5ML; UG/.5ML; UG/.5ML; UG/.5ML
0.5 SUSPENSION INTRAMUSCULAR ONCE
Refills: 0 | Status: DISCONTINUED | OUTPATIENT
Start: 2022-11-21 | End: 2022-12-05

## 2022-11-21 RX ORDER — CEFAZOLIN SODIUM 1 G
2000 VIAL (EA) INJECTION ONCE
Refills: 0 | Status: COMPLETED | OUTPATIENT
Start: 2022-11-21 | End: 2022-11-21

## 2022-11-21 RX ORDER — HYDROMORPHONE HYDROCHLORIDE 2 MG/ML
0.5 INJECTION INTRAMUSCULAR; INTRAVENOUS; SUBCUTANEOUS
Refills: 0 | Status: DISCONTINUED | OUTPATIENT
Start: 2022-11-21 | End: 2022-11-21

## 2022-11-21 NOTE — ASU DISCHARGE PLAN (ADULT/PEDIATRIC) - CARE PROVIDER_API CALL
Mainor Virk)  Surgery  [address], [address]  [place] [address]  Phone: [phone]  Fax: [phone]  Follow Up Time:

## 2022-11-21 NOTE — PRE-ANESTHESIA EVALUATION ADULT - SPO2 (%)
Patient BIBA to ED today from home after trip and fall she called 911.  Patient reports over the past year her blood sugars have been high, she has felt weak, dizziness and today due to the weakness and dizziness her legs got weak and she fell.  Patient with no LOC. 100

## 2022-11-21 NOTE — ASU DISCHARGE PLAN (ADULT/PEDIATRIC) - ASU DC SPECIAL INSTRUCTIONSFT
PAIN: Take over the counter Tylenol Q6 and Motrin 400mg Q6, oxycodone for breakthrough   WOUND: Please keep incisions clean and dry. Please do not scrub or rub incisions. Please to do not apply lotion or powder on incisions.   BATH: Please do not submerge wound under water. You may shower or use sponge bath.  ACTIVITY: No heavy lifting or straining until your follow up appointment. Otherwise, you may return to your usual level of physical activity. If you are taking narcotic pain medication (such as Oxycodone) DO NOT drive a car, operate machinery or make important decisions.  DIET: Return to your usual diet.  NOTIFY YOUR SURGEON IF: You have any bleeding that does not stop, any pus draining from your wound(s), any fever (over 100.4 F) or chills, persistent nausea/vomiting, persistent diarrhea, or if your pain is not controlled on your discharge pain medications.  FOLLOW-UP: Please follow-up with your surgeon, within 1-2 weeks following discharge- please call to schedule an appointment.

## 2022-11-21 NOTE — PRE-ANESTHESIA EVALUATION ADULT - NSANTHPMHFT_GEN_ALL_CORE
chart and consultant notes reviewed. no hx sig cv dz - good et, no cp/sob. ekg noted. tte essentially unremarkable, ct calcium score low. dm a1c < 7, fs 104. hx cirrhosis. smoker. ex etoh abuse

## 2022-11-21 NOTE — ASU DISCHARGE PLAN (ADULT/PEDIATRIC) - NS MD DC FALL RISK RISK
For information on Fall & Injury Prevention, visit: [email or web address] OR  [email or web address] OR  https://www.cdc.gov/steadi/patient.html

## 2022-11-21 NOTE — PATIENT PROFILE ADULT - FALL HARM RISK - UNIVERSAL INTERVENTIONS
Bed in lowest position, wheels locked, appropriate side rails in place/Call bell, personal items and telephone in reach/Instruct patient to call for assistance before getting out of bed or chair/Non-slip footwear when patient is out of bed/Shoals to call system/Physically safe environment - no spills, clutter or unnecessary equipment/Purposeful Proactive Rounding/Room/bathroom lighting operational, light cord in reach

## 2022-11-29 LAB — SURGICAL PATHOLOGY STUDY: SIGNIFICANT CHANGE UP

## 2022-12-08 ENCOUNTER — APPOINTMENT (OUTPATIENT)
Dept: SURGERY | Facility: CLINIC | Age: 62
End: 2022-12-08

## 2022-12-08 VITALS
HEIGHT: 66 IN | HEART RATE: 79 BPM | DIASTOLIC BLOOD PRESSURE: 72 MMHG | RESPIRATION RATE: 16 BRPM | SYSTOLIC BLOOD PRESSURE: 115 MMHG | OXYGEN SATURATION: 99 % | BODY MASS INDEX: 23.3 KG/M2 | WEIGHT: 145 LBS | TEMPERATURE: 97.1 F

## 2022-12-08 PROCEDURE — 99024 POSTOP FOLLOW-UP VISIT: CPT

## 2022-12-08 NOTE — ASSESSMENT
[FreeTextEntry1] : Patient is still having discomfort in the left groin which is understandable.  I have given the patient a letter stating that he is not to lift anything over 30 pounds until I see him again in approximately 2 weeks.

## 2022-12-08 NOTE — PHYSICAL EXAM
[de-identified] : Examination of the left groin shows induration in the cord which is normal postoperative change and induration in the operative repair again which is normal postoperative changes the surgical wound is clean and dry

## 2022-12-08 NOTE — HISTORY OF PRESENT ILLNESS
[de-identified] : Matthew is a 62yr. old male s/p ProGrip mesh repair of the left inguinal hernia on 11/21/2022 is here for post op visit.  Pathology Left hernia sac, excision : Mesothelial - lined fibroconnective tissue.

## 2022-12-13 NOTE — PRE-OP CHECKLIST - TAMPON REMOVED
"Subjective:       Patient ID: Radha Aburto is a 13 y.o. female.    Vitals:  height is 5' 2" (1.575 m) and weight is 48.6 kg (107 lb 4.1 oz). Her temperature is 98.3 °F (36.8 °C). Her blood pressure is 110/79 and her pulse is 98. Her respiration is 20 and oxygen saturation is 98%.     Chief Complaint: Head Lice    13 yr old female presents to the Urgent Care with father for head lice evaluation. Patient head lice have been treated 2x and symptoms noted to have resolved. Father requesting school note.       Head Lice  This is a new problem. The current episode started yesterday. The problem occurs constantly. The problem has been resolved. Pertinent negatives include no abdominal pain, change in bowel habit, chills, congestion, coughing, fatigue, fever, headaches, joint swelling, myalgias, nausea, numbness, rash, sore throat, visual change, vomiting or weakness. Nothing aggravates the symptoms. She has tried nothing for the symptoms. The treatment provided no relief.     Constitution: Negative for chills, fatigue and fever.   HENT:  Negative for congestion and sore throat.    Respiratory:  Negative for cough.    Gastrointestinal:  Negative for abdominal pain, nausea and vomiting.   Musculoskeletal:  Negative for joint swelling and muscle ache.   Skin:  Negative for rash.   Neurological:  Negative for headaches and numbness.     Objective:      Physical Exam   Constitutional: She is cooperative.  Non-toxic appearance. She does not appear ill.   HENT:   Head: Atraumatic. Hair is normal.   No visible on the scalp, neck, and postauricular skin.  No visible nits on the hair shafts.           Comments: No visible on the scalp, neck, and postauricular skin.  No visible nits on the hair shafts.       Cardiovascular: Normal rate.   Pulmonary/Chest: Effort normal.   Abdominal: Normal appearance.   Skin: Skin is not diaphoretic.   Nursing note and vitals reviewed.      Assessment:       1. Head lice          Plan:         Head " lice      Patient Instructions   If you were prescribed a narcotic or controlled medication, do not drive or operate heavy equipment or machinery while taking these medications.  You must understand that you've received an Urgent Care treatment only and that you may be released before all your medical problems are known or treated. You, the patient, will arrange for follow up care as instructed.  Follow up with your PCP or specialty clinic as directed within 2-5 days if not improved or as needed.  You can call [phone] to schedule an appointment with the appropriate provider.  If your condition worsens we recommend that you receive another evaluation at the emergency room immediately or contact your primary medical clinics after hours call service to discuss your concerns.  Please return here or go to the Emergency Department for any concerns or worsening of condition.                        n/a

## 2022-12-22 ENCOUNTER — APPOINTMENT (OUTPATIENT)
Dept: SURGERY | Facility: CLINIC | Age: 62
End: 2022-12-22

## 2022-12-22 VITALS
RESPIRATION RATE: 16 BRPM | HEART RATE: 80 BPM | OXYGEN SATURATION: 99 % | DIASTOLIC BLOOD PRESSURE: 78 MMHG | SYSTOLIC BLOOD PRESSURE: 127 MMHG | TEMPERATURE: 96.3 F

## 2022-12-22 PROCEDURE — 99024 POSTOP FOLLOW-UP VISIT: CPT

## 2022-12-22 NOTE — HISTORY OF PRESENT ILLNESS
[de-identified] : Matthew is a 62yr. old male being seen for two week post-op.  s/p ProGrip mesh repair of the left inguinal hernia on 11/21/2022 \par Today pt reports feeling 6/10 groin pain, swelling, not taking any pain medications.   Soft BMs once daily.  Good appetite.  No c/o nausea/vomiting.  Denies fever and chills.  Not on anticoagulants.  Surgical incision TRAVON, clean dry intact.  \par

## 2022-12-22 NOTE — PHYSICAL EXAM
[de-identified] : The left inguinal hernia repair is intact surgical wound is clean and dry there is marked induration for the cord but the testicle is soft.

## 2022-12-22 NOTE — ASSESSMENT
[FreeTextEntry1] : I have discussed with the patient activity and have limited his lifting to no more than 50 pounds  for the next 2 weeks.  I have suggested to the patient that he is having discomfort in the area and should take either an anti-inflammatory like Motrin or extra strength Tylenol.

## 2023-01-01 NOTE — PROGRESS NOTE ADULT - PROBLEM SELECTOR PLAN 2
History & Physical  Neonatology    Boy Era Cheung is a 6 days male    MRN: [id number]          SUBJECTIVE:     Reason for Admission:     Infant [facility] [place] today - Dr Elkins Prenatal ultrasound revealed normal anatomy. Prenatal hemoglobin currently stable at 9.1, s/p 1u pRBCs with appropriate response  - endoscopy demonstrated bleeding peptic ulcer but no bleeding varices  - octreotide was d/c'ed  - appreciate GI recommendations, will transfuse if hemoglobin <8 with emergent EGD and restart octreotide at that point  - can trend hemoglobin [id number], unless signs of bleeding

## 2023-01-11 NOTE — PRE-ANESTHESIA EVALUATION ADULT - BSA (M2)
1.72 Bactrim Pregnancy And Lactation Text: This medication is Pregnancy Category D and is known to cause fetal risk.  It is also excreted in breast milk.

## 2023-01-17 ENCOUNTER — LABORATORY RESULT (OUTPATIENT)
Age: 63
End: 2023-01-17

## 2023-01-17 ENCOUNTER — APPOINTMENT (OUTPATIENT)
Dept: FAMILY MEDICINE | Facility: CLINIC | Age: 63
End: 2023-01-17
Payer: MEDICAID

## 2023-01-17 VITALS
HEIGHT: 66 IN | HEART RATE: 68 BPM | SYSTOLIC BLOOD PRESSURE: 116 MMHG | BODY MASS INDEX: 23.32 KG/M2 | WEIGHT: 145.13 LBS | DIASTOLIC BLOOD PRESSURE: 64 MMHG | OXYGEN SATURATION: 97 %

## 2023-01-17 DIAGNOSIS — Z11.3 ENCOUNTER FOR SCREENING FOR INFECTIONS WITH A PREDOMINANTLY SEXUAL MODE OF TRANSMISSION: ICD-10-CM

## 2023-01-17 DIAGNOSIS — Z87.09 PERSONAL HISTORY OF OTHER DISEASES OF THE RESPIRATORY SYSTEM: ICD-10-CM

## 2023-01-17 DIAGNOSIS — F10.20 ALCOHOL DEPENDENCE, UNCOMPLICATED: ICD-10-CM

## 2023-01-17 DIAGNOSIS — Z13.6 ENCOUNTER FOR SCREENING FOR CARDIOVASCULAR DISORDERS: ICD-10-CM

## 2023-01-17 DIAGNOSIS — N52.9 MALE ERECTILE DYSFUNCTION, UNSPECIFIED: ICD-10-CM

## 2023-01-17 DIAGNOSIS — Z01.810 ENCOUNTER FOR PREPROCEDURAL CARDIOVASCULAR EXAMINATION: ICD-10-CM

## 2023-01-17 DIAGNOSIS — Z09 ENCOUNTER FOR FOLLOW-UP EXAMINATION AFTER COMPLETED TREATMENT FOR CONDITIONS OTHER THAN MALIGNANT NEOPLASM: ICD-10-CM

## 2023-01-17 DIAGNOSIS — Z23 ENCOUNTER FOR IMMUNIZATION: ICD-10-CM

## 2023-01-17 DIAGNOSIS — L84 CORNS AND CALLOSITIES: ICD-10-CM

## 2023-01-17 DIAGNOSIS — Z01.818 ENCOUNTER FOR OTHER PREPROCEDURAL EXAMINATION: ICD-10-CM

## 2023-01-17 PROCEDURE — 99406 BEHAV CHNG SMOKING 3-10 MIN: CPT

## 2023-01-17 PROCEDURE — 99396 PREV VISIT EST AGE 40-64: CPT | Mod: 25

## 2023-01-17 PROCEDURE — 93000 ELECTROCARDIOGRAM COMPLETE: CPT

## 2023-01-17 RX ORDER — SILDENAFIL 50 MG/1
50 TABLET ORAL
Qty: 15 | Refills: 1 | Status: ACTIVE | COMMUNITY
Start: 2023-01-17 | End: 1900-01-01

## 2023-01-17 RX ORDER — NICOTINE 21 MG/24HR
21 PATCH, TRANSDERMAL 24 HOURS TRANSDERMAL DAILY
Qty: 1 | Refills: 1 | Status: ACTIVE | COMMUNITY
Start: 2023-01-17 | End: 1900-01-01

## 2023-01-17 NOTE — PHYSICAL EXAM
[No Acute Distress] : no acute distress [Well Nourished] : well nourished [Well Developed] : well developed [Well-Appearing] : well-appearing [Normal Sclera/Conjunctiva] : normal sclera/conjunctiva [PERRL] : pupils equal round and reactive to light [EOMI] : extraocular movements intact [Normal Outer Ear/Nose] : the outer ears and nose were normal in appearance [Normal Oropharynx] : the oropharynx was normal [No JVD] : no jugular venous distention [No Lymphadenopathy] : no lymphadenopathy [Supple] : supple [Thyroid Normal, No Nodules] : the thyroid was normal and there were no nodules present [No Respiratory Distress] : no respiratory distress  [No Accessory Muscle Use] : no accessory muscle use [Clear to Auscultation] : lungs were clear to auscultation bilaterally [Normal Rate] : normal rate  [Regular Rhythm] : with a regular rhythm [Normal S1, S2] : normal S1 and S2 [No Murmur] : no murmur heard [No Abdominal Bruit] : a ~M bruit was not heard ~T in the abdomen [Pedal Pulses Present] : the pedal pulses are present [No Edema] : there was no peripheral edema [No Palpable Aorta] : no palpable aorta [No Extremity Clubbing/Cyanosis] : no extremity clubbing/cyanosis [Soft] : abdomen soft [Non Tender] : non-tender [Non-distended] : non-distended [No Masses] : no abdominal mass palpated [No HSM] : no HSM [Normal Bowel Sounds] : normal bowel sounds [Normal Posterior Cervical Nodes] : no posterior cervical lymphadenopathy [Normal Anterior Cervical Nodes] : no anterior cervical lymphadenopathy [No CVA Tenderness] : no CVA  tenderness [No Spinal Tenderness] : no spinal tenderness [No Joint Swelling] : no joint swelling [Grossly Normal Strength/Tone] : grossly normal strength/tone [No Rash] : no rash [Coordination Grossly Intact] : coordination grossly intact [No Focal Deficits] : no focal deficits [Normal Gait] : normal gait [Normal Affect] : the affect was normal [Alert and Oriented x3] : oriented to person, place, and time [Normal Insight/Judgement] : insight and judgment were intact [de-identified] : left impacted cerumen [de-identified] : thickened tender callous below feet

## 2023-01-17 NOTE — HEALTH RISK ASSESSMENT
[Good] : ~his/her~  mood as  good [Current] : Current [20 or more] : 20 or more [No] : In the past 12 months have you used drugs other than those required for medical reasons? No [0] : 2) Feeling down, depressed, or hopeless: Not at all (0) [With Family] : lives with family [# of Members in Household ___] :  household currently consist of [unfilled] member(s) [Employed] : employed [] :  [# Of Children ___] : has [unfilled] children [Fully functional (bathing, dressing, toileting, transferring, walking, feeding)] : Fully functional (bathing, dressing, toileting, transferring, walking, feeding) [Fully functional (using the telephone, shopping, preparing meals, housekeeping, doing laundry, using] : Fully functional and needs no help or supervision to perform IADLs (using the telephone, shopping, preparing meals, housekeeping, doing laundry, using transportation, managing medications and managing finances) [de-identified] : former [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

## 2023-01-17 NOTE — HISTORY OF PRESENT ILLNESS
[FreeTextEntry1] : 62 year old male who presents today for a complete physical exam.\par speaks of 13 yo son who had AVM and was in coma back in 2019\par c/o ED\par c/o bunion and painful thick skin to sole\par stopped drinking, but still smoking

## 2023-01-18 LAB
25(OH)D3 SERPL-MCNC: 32.8 NG/ML
ALBUMIN SERPL ELPH-MCNC: 4.8 G/DL
ALP BLD-CCNC: 114 U/L
ALT SERPL-CCNC: 22 U/L
ANION GAP SERPL CALC-SCNC: 13 MMOL/L
APPEARANCE: CLEAR
AST SERPL-CCNC: 28 U/L
BASOPHILS # BLD AUTO: 0.03 K/UL
BASOPHILS NFR BLD AUTO: 0.4 %
BILIRUB SERPL-MCNC: 0.3 MG/DL
BILIRUBIN URINE: NEGATIVE
BLOOD URINE: NEGATIVE
BUN SERPL-MCNC: 14 MG/DL
CALCIUM SERPL-MCNC: 9.9 MG/DL
CHLORIDE SERPL-SCNC: 98 MMOL/L
CHOLEST SERPL-MCNC: 150 MG/DL
CO2 SERPL-SCNC: 23 MMOL/L
COLOR: COLORLESS
CREAT SERPL-MCNC: 0.7 MG/DL
EGFR: 104 ML/MIN/1.73M2
EOSINOPHIL # BLD AUTO: 0.1 K/UL
EOSINOPHIL NFR BLD AUTO: 1.2 %
ESTIMATED AVERAGE GLUCOSE: 131 MG/DL
FRUCTOSAMINE SERPL-MCNC: 316 UMOL/L
GLUCOSE QUALITATIVE U: NEGATIVE
GLUCOSE SERPL-MCNC: 106 MG/DL
HBA1C MFR BLD HPLC: 6.2 %
HCT VFR BLD CALC: 42 %
HDLC SERPL-MCNC: 48 MG/DL
HGB BLD-MCNC: 13.9 G/DL
IMM GRANULOCYTES NFR BLD AUTO: 0.4 %
KETONES URINE: NEGATIVE
LDLC SERPL CALC-MCNC: 93 MG/DL
LEUKOCYTE ESTERASE URINE: ABNORMAL
LYMPHOCYTES # BLD AUTO: 2.85 K/UL
LYMPHOCYTES NFR BLD AUTO: 34.9 %
MAN DIFF?: NORMAL
MCHC RBC-ENTMCNC: 30.8 PG
MCHC RBC-ENTMCNC: 33.1 GM/DL
MCV RBC AUTO: 92.9 FL
MONOCYTES # BLD AUTO: 0.62 K/UL
MONOCYTES NFR BLD AUTO: 7.6 %
NEUTROPHILS # BLD AUTO: 4.53 K/UL
NEUTROPHILS NFR BLD AUTO: 55.5 %
NITRITE URINE: NEGATIVE
NONHDLC SERPL-MCNC: 102 MG/DL
PH URINE: 6
PLATELET # BLD AUTO: 121 K/UL
POTASSIUM SERPL-SCNC: 4.6 MMOL/L
PROT SERPL-MCNC: 7.7 G/DL
PROTEIN URINE: NEGATIVE
PSA SERPL-MCNC: 1.52 NG/ML
RBC # BLD: 4.52 M/UL
RBC # FLD: 13 %
SODIUM SERPL-SCNC: 134 MMOL/L
SPECIFIC GRAVITY URINE: 1.01
TESTOST SERPL-MCNC: 876 NG/DL
TRIGL SERPL-MCNC: 45 MG/DL
TSH SERPL-ACNC: 1.15 UIU/ML
UROBILINOGEN URINE: NORMAL
WBC # FLD AUTO: 8.16 K/UL

## 2023-01-30 ENCOUNTER — APPOINTMENT (OUTPATIENT)
Dept: THORACIC SURGERY | Facility: CLINIC | Age: 63
End: 2023-01-30
Payer: MEDICAID

## 2023-01-30 ENCOUNTER — NON-APPOINTMENT (OUTPATIENT)
Age: 63
End: 2023-01-30

## 2023-01-30 VITALS — HEIGHT: 66 IN | BODY MASS INDEX: 22.98 KG/M2 | WEIGHT: 143 LBS

## 2023-01-30 PROCEDURE — G0296 VISIT TO DETERM LDCT ELIG: CPT | Mod: 95

## 2023-01-31 ENCOUNTER — APPOINTMENT (OUTPATIENT)
Dept: FAMILY MEDICINE | Facility: CLINIC | Age: 63
End: 2023-01-31
Payer: MEDICAID

## 2023-01-31 VITALS
OXYGEN SATURATION: 97 % | HEART RATE: 86 BPM | HEIGHT: 66 IN | WEIGHT: 143 LBS | SYSTOLIC BLOOD PRESSURE: 124 MMHG | BODY MASS INDEX: 22.98 KG/M2 | DIASTOLIC BLOOD PRESSURE: 76 MMHG | TEMPERATURE: 97.2 F

## 2023-01-31 DIAGNOSIS — H91.92 UNSPECIFIED HEARING LOSS, LEFT EAR: ICD-10-CM

## 2023-01-31 DIAGNOSIS — H61.22 IMPACTED CERUMEN, LEFT EAR: ICD-10-CM

## 2023-01-31 PROCEDURE — 99213 OFFICE O/P EST LOW 20 MIN: CPT | Mod: 25

## 2023-01-31 PROCEDURE — 69209 REMOVE IMPACTED EAR WAX UNI: CPT

## 2023-01-31 NOTE — PHYSICAL EXAM
[No Acute Distress] : no acute distress [Normal Sclera/Conjunctiva] : normal sclera/conjunctiva [EOMI] : extraocular movements intact [Normal Outer Ear/Nose] : the outer ears and nose were normal in appearance [No Respiratory Distress] : no respiratory distress  [No Accessory Muscle Use] : no accessory muscle use [Coordination Grossly Intact] : coordination grossly intact [Normal Affect] : the affect was normal [Alert and Oriented x3] : oriented to person, place, and time [Normal Insight/Judgement] : insight and judgment were intact [de-identified] : left ear w/ impacted cerumen

## 2023-02-01 ENCOUNTER — APPOINTMENT (OUTPATIENT)
Dept: CT IMAGING | Facility: IMAGING CENTER | Age: 63
End: 2023-02-01
Payer: MEDICAID

## 2023-02-01 ENCOUNTER — OUTPATIENT (OUTPATIENT)
Dept: OUTPATIENT SERVICES | Facility: HOSPITAL | Age: 63
LOS: 1 days | End: 2023-02-01
Payer: MEDICAID

## 2023-02-01 DIAGNOSIS — F17.210 NICOTINE DEPENDENCE, CIGARETTES, UNCOMPLICATED: ICD-10-CM

## 2023-02-01 DIAGNOSIS — Z00.8 ENCOUNTER FOR OTHER GENERAL EXAMINATION: ICD-10-CM

## 2023-02-01 PROCEDURE — 71271 CT THORAX LUNG CANCER SCR C-: CPT | Mod: 26

## 2023-02-01 PROCEDURE — 71271 CT THORAX LUNG CANCER SCR C-: CPT

## 2023-02-01 NOTE — HISTORY OF PRESENT ILLNESS
[TextBox_13] : Referred by Dr. Mark Zarate\par \par Mr. LILIBETH US is a 62 year old man with a history of nicotine dependence\par \par  Over the telephone today we reviewed and confirmed that the patient meets screening eligibility criteria:\par \par -Age: 62 years old\par \par Smoking status:\par \par -Current smoker\par \par -Number of pack(s) per day: 1\par \par -Number of years smoked: 48\par \par -Number of pack years smokin\par \par \par \par \par Mr. US denies any personal history of lung cancer. Denies any s/s of lung cancer. No lung cancer in a 1st degree relative. Denies any history of lung disease. Denies any history of occupational exposures\par  [PacksperYear] : 48

## 2023-02-01 NOTE — PLAN
[FreeTextEntry1] : LDCT scheduled for 2/1/23 at [facility]

## 2023-02-14 ENCOUNTER — APPOINTMENT (OUTPATIENT)
Dept: CARDIOLOGY | Facility: CLINIC | Age: 63
End: 2023-02-14
Payer: MEDICAID

## 2023-02-14 VITALS
TEMPERATURE: 98.3 F | SYSTOLIC BLOOD PRESSURE: 120 MMHG | WEIGHT: 147 LBS | OXYGEN SATURATION: 97 % | BODY MASS INDEX: 23.63 KG/M2 | HEART RATE: 77 BPM | HEIGHT: 66 IN | DIASTOLIC BLOOD PRESSURE: 74 MMHG

## 2023-02-14 PROCEDURE — 99214 OFFICE O/P EST MOD 30 MIN: CPT | Mod: 25

## 2023-02-14 PROCEDURE — 93000 ELECTROCARDIOGRAM COMPLETE: CPT

## 2023-02-14 RX ORDER — ROSUVASTATIN CALCIUM 10 MG/1
10 TABLET, FILM COATED ORAL
Qty: 30 | Refills: 3 | Status: ACTIVE | COMMUNITY
Start: 2022-11-04 | End: 1900-01-01

## 2023-02-15 ENCOUNTER — NON-APPOINTMENT (OUTPATIENT)
Age: 63
End: 2023-02-15

## 2023-02-15 NOTE — HISTORY OF PRESENT ILLNESS
[FreeTextEntry1] : The patient is here for follow-up of elevated cholesterol. Patient is currently tolerating medication and denies muscle pain, joint pain, back pain,  urinary changes , nausea, vomiting, abdominal pain or diarrhea. The patient is trying to follow a low cholesterol diet. Patient has calcium score of 91 with no chest pain, SOB, or leg edema, with a Hx of diabetes that is being managed with metformin.

## 2023-02-17 LAB
ALBUMIN SERPL ELPH-MCNC: 4.5 G/DL
ALP BLD-CCNC: 103 U/L
ALT SERPL-CCNC: 19 U/L
ANION GAP SERPL CALC-SCNC: 14 MMOL/L
APO B SERPL-MCNC: 80 MG/DL
AST SERPL-CCNC: 26 U/L
BILIRUB DIRECT SERPL-MCNC: 0.1 MG/DL
BILIRUB INDIRECT SERPL-MCNC: 0.2 MG/DL
BILIRUB SERPL-MCNC: 0.3 MG/DL
BUN SERPL-MCNC: 9 MG/DL
CALCIUM SERPL-MCNC: 10.3 MG/DL
CHLORIDE SERPL-SCNC: 100 MMOL/L
CHOLEST SERPL-MCNC: 156 MG/DL
CK SERPL-CCNC: 148 U/L
CO2 SERPL-SCNC: 22 MMOL/L
CREAT SERPL-MCNC: 0.68 MG/DL
EGFR: 104 ML/MIN/1.73M2
ESTIMATED AVERAGE GLUCOSE: 134 MG/DL
GLUCOSE SERPL-MCNC: 82 MG/DL
HBA1C MFR BLD HPLC: 6.3 %
HDLC SERPL-MCNC: 45 MG/DL
LDLC SERPL CALC-MCNC: 92 MG/DL
LDLC SERPL DIRECT ASSAY-MCNC: 92 MG/DL
NONHDLC SERPL-MCNC: 111 MG/DL
POTASSIUM SERPL-SCNC: 4 MMOL/L
PROT SERPL-MCNC: 7.9 G/DL
SODIUM SERPL-SCNC: 136 MMOL/L
TRIGL SERPL-MCNC: 95 MG/DL

## 2023-03-16 ENCOUNTER — APPOINTMENT (OUTPATIENT)
Dept: HEPATOLOGY | Facility: CLINIC | Age: 63
End: 2023-03-16
Payer: MEDICAID

## 2023-03-16 VITALS
OXYGEN SATURATION: 100 % | SYSTOLIC BLOOD PRESSURE: 147 MMHG | HEART RATE: 62 BPM | BODY MASS INDEX: 23.14 KG/M2 | HEIGHT: 66 IN | RESPIRATION RATE: 14 BRPM | TEMPERATURE: 98 F | DIASTOLIC BLOOD PRESSURE: 80 MMHG | WEIGHT: 144 LBS

## 2023-03-16 PROCEDURE — 99214 OFFICE O/P EST MOD 30 MIN: CPT

## 2023-03-16 NOTE — REVIEW OF SYSTEMS
[Fever] : no fever [Chills] : no chills [Abdominal Pain] : no abdominal pain [Vomiting] : no vomiting [Diarrhea] : no diarrhea [Melena] : no melena [Confused] : no confusion [Negative] : Heme/Lymph

## 2023-03-16 NOTE — PHYSICAL EXAM
[Scleral Icterus] : No Scleral Icterus [Abdominal  Ascites] : no ascites [Asterixis] : no asterixis observed [Jaundice] : No jaundice [Palmar Erythema] : no Palmar Erythema [General Appearance - Alert] : alert [General Appearance - In No Acute Distress] : in no acute distress [Sclera] : the sclera and conjunctiva were normal [Respiration, Rhythm And Depth] : normal respiratory rhythm and effort [Auscultation Breath Sounds / Voice Sounds] : lungs were clear to auscultation bilaterally [Heart Rate And Rhythm] : heart rate was normal and rhythm regular [Heart Sounds] : normal S1 and S2 [Edema] : there was no peripheral edema [Abdomen Soft] : soft [Abdomen Tenderness] : non-tender [] : no hepato-splenomegaly [FreeTextEntry1] : reducible umbilical hernia [No Focal Deficits] : no focal deficits [Oriented To Time, Place, And Person] : oriented to person, place, and time [Affect] : the affect was normal

## 2023-03-16 NOTE — HISTORY OF PRESENT ILLNESS
[de-identified] : Mr. Matthew Howard 62 yoM here for follow up for cirrhosis presumed secondary to AUD. \par \par 3/16/23 s/p mesh repair of the left inguinal hernia on 2022 and has been doing well. The patient does not have fluid overload.  Mental status is clear. Denies abdominal pain, nausea, vomiting, melena, hematochezia, or hematemesis. Reports that he has occasional beer. BW in 2023 showed normal liver enzymes. Did not get abdo US as ordered. Was not contacted to schedule EGD/colonoscopy. \par \par 22 Denies abdominal pain, nausea, vomiting, melena, hematochezia, or hematemesis. Feels well. Has not done EGD/Col yet. Needs dental work, needs clearance for liver. Still trying to get hernia repair and has gone to see endo with A1c now 7.6, on alogliptin-metformin. He is leaving to [facility] for 3 weeks b/c brother .\par \par 2/10/22 He currently feels well. Denies abdominal pain, nausea, vomiting, melena, hematochezia, or hematemesis. He did not get BW ordered at his last visit in Dec. He was scheduled for left inguinal hernia and umbilical hernia repair on 1/10/21 with Dr. Virk but surgery was canceled due to A1c >8 and cirrhosis. He reports no pain or active symptoms related to umbilical hernia. 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 He is seeing endocrinologist to better control his T2DM. \par \par He reports drinking few gin a day, everyday for many years, stopped in 2019 for few months after being hospitalized for bleeding peptic ulcer but relapsed. He has been drinking 1 bottle of wine daily but stopped completely in Oct 2021 after being told that he has enlarged liver. States that he's aware of liver cirrhosis.  \par \par Pt was admitted at [facility] for hematemesis and dark stools, at that time he had an EGD that showed nonbleeding large (>5mm) esophageal varices in lower and mid esophagus, as well as localized area of erythematous and edematous mucosa in lesser curvature with ulceration, portal hypertensive gastropathy.  No EGD since 2019. Not on NSBB. \par \par Said he never had a screening colonoscopy. No family history of colon cancer.\par \par -Abdo CT 21 showed 1.1 cm hypodense central right hepatic lesion that is indeterminate (test done w/o contrast) and is stable since 2019. \par -Abdo MRI 22 showed 1.2 cm right hepatic lobe cyst, no ascites, hepatic and portal veins are patent\par \par -BW 22 ALT 16, AST 25, CR 0.66, ALT 16, AST 25, Tbil 0.3, CR 0.66, Na 137, no INR level. \par BW 21, INR not done. ALT 15, AST 22, , CR 0.58, sodium 131. Tbil 0.4 \par \par

## 2023-03-16 NOTE — ASSESSMENT
[FreeTextEntry1] : Mr. Matthew Howard 62 yoM here for follow up for cirrhosis presumed secondary to AUD. \par \par #AUD\par -Long history of heavy alcohol use, reported last drank in Oct 2021 however been having occasional beer. \par - I have emphasized the importance of continued alcohol  abstinence and that no amount or type of alcohol is safe for his liver. \par \par # Compensated Cirrhosis \par -Low MELD NA\par -Ascites:None\par -EV:  EGD on 11/12/19 showed nonbleeding large (>5mm) esophageal varices in lower and mid esophagus, portal hypertensive gastropathy.  No EGD since 2019. Not on NSBB. He agrees to have this done with our office, was ordered in Sept 2022 but  did not call . EGD/colonoscopy re=ordered today. \par -Hepatic encephalopathy: none \par -HCC: neg on abdo CT 11/24/21 and MRI 1/13/22. Over due for HCC screening and abdo US RX given.\par -Will get fibroscan \par \par I discussed the meaning of cirrhosis with the patient. I reviewed the natural history of the disease. I explained the risks for the development of esophageal varices with and without bleeding, hepatic encephalopathy, ascites, hepatocellular carcinoma, and liver failure. I have explained that disease can progress to the point of requiring an evaluation for liver transplantation. I have explained the need for imaging every 6 months to screen for liver cancer.\par \par #Health maintenance in cirrhosis\par -Recommended vaccination for hep A and B since non immune. \par -Patient was counseled to: abstain from alcohol and all illicit drugs; avoid use of herbal and dietary supplements due to potential hepatotoxicity; limit use of acetaminophen to <2 grams per day; avoid use of nonsteroidal antiinflammatory drugs (NSAIDs) as these can lead to diuretic resistance and precipitate renal dysfunction in patients with advanced liver disease; avoid eating any unpasteurized dairy products; avoid eating any raw or undercooked eggs, fish, poultry, or meat; and avoid eating raw/steamed oysters or other shellfish to avoid risk of Vibrio infection.\par -Need repeat EGD to screen for esophageal varices. \par -Reports never had colonoscopy in the past, recommended one to screen for colon cancer at the same time as EGD. \par \par Plan:\par BW, abdo US, fibroscan, EGD/colonoscopy, F/U in 3 month. \par \par \par

## 2023-03-21 ENCOUNTER — APPOINTMENT (OUTPATIENT)
Dept: HEPATOLOGY | Facility: CLINIC | Age: 63
End: 2023-03-21
Payer: MEDICAID

## 2023-03-21 ENCOUNTER — APPOINTMENT (OUTPATIENT)
Dept: HEPATOLOGY | Facility: CLINIC | Age: 63
End: 2023-03-21

## 2023-03-21 PROCEDURE — 76981 USE PARENCHYMA: CPT

## 2023-03-21 PROCEDURE — 90636 HEP A/HEP B VACC ADULT IM: CPT

## 2023-03-21 PROCEDURE — 90471 IMMUNIZATION ADMIN: CPT

## 2023-03-23 ENCOUNTER — OUTPATIENT (OUTPATIENT)
Dept: OUTPATIENT SERVICES | Facility: HOSPITAL | Age: 63
LOS: 1 days | End: 2023-03-23
Payer: MEDICAID

## 2023-03-23 ENCOUNTER — APPOINTMENT (OUTPATIENT)
Dept: ULTRASOUND IMAGING | Facility: CLINIC | Age: 63
End: 2023-03-23
Payer: MEDICAID

## 2023-03-23 ENCOUNTER — NON-APPOINTMENT (OUTPATIENT)
Age: 63
End: 2023-03-23

## 2023-03-23 DIAGNOSIS — Z90.49 ACQUIRED ABSENCE OF OTHER SPECIFIED PARTS OF DIGESTIVE TRACT: Chronic | ICD-10-CM

## 2023-03-23 DIAGNOSIS — Z00.8 ENCOUNTER FOR OTHER GENERAL EXAMINATION: ICD-10-CM

## 2023-03-23 DIAGNOSIS — K74.60 UNSPECIFIED CIRRHOSIS OF LIVER: ICD-10-CM

## 2023-03-23 DIAGNOSIS — Z98.890 OTHER SPECIFIED POSTPROCEDURAL STATES: Chronic | ICD-10-CM

## 2023-03-23 DIAGNOSIS — K70.30 ALCOHOLIC CIRRHOSIS OF LIVER WITHOUT ASCITES: ICD-10-CM

## 2023-03-23 DIAGNOSIS — I85.00 ESOPHAGEAL VARICES WITHOUT BLEEDING: ICD-10-CM

## 2023-03-23 LAB
AFP-TM SERPL-MCNC: 4.6 NG/ML
ALBUMIN SERPL ELPH-MCNC: 4.7 G/DL
ALP BLD-CCNC: 114 U/L
ALT SERPL-CCNC: 24 U/L
ANION GAP SERPL CALC-SCNC: 14 MMOL/L
AST SERPL-CCNC: 27 U/L
BASOPHILS # BLD AUTO: 0.03 K/UL
BASOPHILS NFR BLD AUTO: 0.3 %
BILIRUB SERPL-MCNC: 0.3 MG/DL
BUN SERPL-MCNC: 11 MG/DL
CALCIUM SERPL-MCNC: 9.7 MG/DL
CHLORIDE SERPL-SCNC: 102 MMOL/L
CO2 SERPL-SCNC: 23 MMOL/L
CREAT SERPL-MCNC: 0.66 MG/DL
EGFR: 105 ML/MIN/1.73M2
EOSINOPHIL # BLD AUTO: 0.05 K/UL
EOSINOPHIL NFR BLD AUTO: 0.6 %
HCT VFR BLD CALC: 43.3 %
HGB BLD-MCNC: 14.3 G/DL
IMM GRANULOCYTES NFR BLD AUTO: 0.2 %
INR PPP: 1.21 RATIO
LYMPHOCYTES # BLD AUTO: 2.23 K/UL
LYMPHOCYTES NFR BLD AUTO: 24.9 %
MAN DIFF?: NORMAL
MCHC RBC-ENTMCNC: 31.8 PG
MCHC RBC-ENTMCNC: 33 GM/DL
MCV RBC AUTO: 96.4 FL
MONOCYTES # BLD AUTO: 0.62 K/UL
MONOCYTES NFR BLD AUTO: 6.9 %
NEUTROPHILS # BLD AUTO: 5.99 K/UL
NEUTROPHILS NFR BLD AUTO: 67.1 %
PLATELET # BLD AUTO: 105 K/UL
POTASSIUM SERPL-SCNC: 4.6 MMOL/L
PROT SERPL-MCNC: 7.5 G/DL
PT BLD: 14.1 SEC
RBC # BLD: 4.49 M/UL
RBC # FLD: 13.2 %
SODIUM SERPL-SCNC: 138 MMOL/L
WBC # FLD AUTO: 8.94 K/UL

## 2023-03-23 PROCEDURE — 76700 US EXAM ABDOM COMPLETE: CPT | Mod: 26

## 2023-03-23 PROCEDURE — 76700 US EXAM ABDOM COMPLETE: CPT

## 2023-03-27 ENCOUNTER — NON-APPOINTMENT (OUTPATIENT)
Age: 63
End: 2023-03-27

## 2023-04-07 NOTE — PATIENT PROFILE ADULT - NSPROGENANESREACTION_GEN_A_NUR
Patient Name: Elijah Escobar  : 1956    MRN: [id number]                              Today's Date: 2023       Admit Date: 3/31/2023    Visit Dx:     ICD-10-CM ICD-9-CM   1. S/P craniotomy  Z98.890 V45.89   2. Impaired mobility  Z74.09 799.89   3. Infection of deep incisional surgical site after procedure, initial encounter  T81.42XA 998.59     Patient Active Problem List   Diagnosis   • Meningioma s/p craniotomy   • Hypertension   • GERD (gastroesophageal reflux disease)   • Coronary artery disease   • Class 2 severe obesity due to excess calories with serious comorbidity and body mass index (BMI) of 38.0 to 38.9 in adult   • Type 2 diabetes mellitus with hyperglycemia and peripheral neuropathy, with long-term current use of insulin (HCC)   • Leukocytosis   • Other specified anemias   • Paroxysmal atrial fibrillation with rapid ventricular response   • Post-operative infection   • Smoker   • History of cranioplasty   • Facial edema   • Swelling of scalp   • Acute pulmonary edema due to A-fib RVR   • Acute respiratory failure with hypoxia   • Type 2 myocardial infarction due to arrhythmia   • COPD (chronic obstructive pulmonary disease)   • Obesity (BMI 30-39.9)   • Tobacco abuse, in remission   • Acute systolic heart failure   • ADDIE (obstructive sleep apnea)     Past Medical History:   Diagnosis Date   • Brain tumor    • Coronary artery disease    • COVID-19 vaccine series completed     MODERNA X 3; LAST DOSE 3/2022   • Diabetes    • Erectile dysfunction    • GERD (gastroesophageal reflux disease)    • Hypercholesteremia    • Hypertension    • Seizure      Past Surgical History:   Procedure Laterality Date   • BALLOON ANGIOPLASTY, ARTERY Right    • COLONOSCOPY     • CRANIECTOMY Right 2022    Procedure: CRANIECTOMY WITH INCISIONAL WASHOUT;  Surgeon: Felipe Banerjee MD;  Location: [facility];  Service: Neurosurgery;  Laterality: Right;   • CRANIOPLASTY Right 10/4/2022    Procedure: CRANIOPLASTY  right with Lumbar drain;  Surgeon: Flaco Portillo MD;  Location:  [facility];  Service: Neurosurgery;  Laterality: Right;   • CRANIOPLASTY N/A 4/4/2023    Procedure: CRANIOPLASTY, removal, right, horseshoe;  Surgeon: Flaco Portillo MD;  Location:  [facility];  Service: Neurosurgery;  Laterality: N/A;   • CRANIOTOMY FOR TUMOR Right 6/16/2022    Procedure: CRANIOTOMY FOR TUMOR STERIOTACTIC WITH BRAIN LAB, right;  Surgeon: Flaco Portillo MD;  Location:  [facility];  Service: Neurosurgery;  Laterality: Right;      General Information     Row Name 04/07/23 1315          OT Time and Intention    Document Type therapy note (daily note)  -     Mode of Treatment occupational therapy  -     Row Name 04/07/23 1315          General Information    Patient Profile Reviewed yes  -     Existing Precautions/Restrictions fall;oxygen therapy device and L/min;other (see comments)  -     Row Name 04/07/23 1315          Cognition    Orientation Status (Cognition) oriented x 4  -     Row Name 04/07/23 1315          Safety Issues, Functional Mobility    Impairments Affecting Function (Mobility) endurance/activity tolerance;shortness of breath  -           User Key  (r) = Recorded By, (t) = Taken By, (c) = Cosigned By    Initials Name Provider Type    Jerica Rivera OTR/L Occupational Therapist                 Mobility/ADL's     Row Name 04/07/23 1315          Bed Mobility    Rolling Right [place] (Bed Mobility) modified independence  -     Scooting/Bridging [place] (Bed Mobility) modified independence  -     Supine-Sit [place] (Bed Mobility) contact guard  -     Sit-Supine [place] (Bed Mobility) minimum assist (75% patient effort)  -           User Key  (r) = Recorded By, (t) = Taken By, (c) = Cosigned By    Initials Name Provider Type    Jerica Rivera, OTR/L Occupational Therapist               Obj/Interventions     Row Name 04/07/23 1315          Shoulder (Therapeutic  Exercise)    Shoulder (Therapeutic Exercise) AROM (active range of motion)  -     Shoulder AROM (Therapeutic Exercise) bilateral;flexion;extension;10 repetitions  -     Row Name 04/07/23 1315          Elbow/Forearm (Therapeutic Exercise)    Elbow/Forearm (Therapeutic Exercise) AROM (active range of motion)  -     Elbow/Forearm AROM (Therapeutic Exercise) bilateral;flexion;extension;supination;pronation;10 repetitions  -     Row Name 04/07/23 1315          Wrist (Therapeutic Exercise)    Wrist (Therapeutic Exercise) AROM (active range of motion)  -     Wrist AROM (Therapeutic Exercise) bilateral;flexion;extension;10 repetitions  -     Row Name 04/07/23 1315          Hand (Therapeutic Exercise)    Hand (Therapeutic Exercise) AROM (active range of motion)  -     Hand AROM/AAROM (Therapeutic Exercise) bilateral;finger flexion;finger extension;10 repetitions  -     Row Name 04/07/23 1315          Motor Skills    Therapeutic Exercise shoulder;elbow/forearm;hand;wrist  -           User Key  (r) = Recorded By, (t) = Taken By, (c) = Cosigned By    Initials Name Provider Type     Jerica Hanna, OTR/L Occupational Therapist               Goals/Plan    No documentation.                Clinical Impression     Row Name 04/07/23 1315          Pain Assessment    Additional Documentation Pain Scale: FACES Pre/Post-Treatment (Group)  -[facility] Name 04/07/23 1315          Pain Scale: FACES Pre/Post-Treatment    Pain: FACES Scale, Pretreatment 0-->no hurt  -     Posttreatment Pain Rating 2-->hurts little bit  -     Pain Location - head  -     Row Name 04/07/23 1315          Plan of Care Review    Plan of Care Reviewed With patient  -     Progress no change  -     Outcome Evaluation Pt drain & dressing intact & helmet does not fit. Assisted pt to EOB & completed BUE AROM ex.  Pt limited to bed tasks 2' to helmet not fitting.  Pt is very participatory, pleasant, & able to follow commands.  Cont OT tx  -CH      Row Name 04/07/23 1315          Therapy Assessment/Plan (OT)    Rehab Potential (OT) good, to achieve stated therapy goals  -     Criteria for Skilled Therapeutic Interventions Met (OT) yes;skilled treatment is necessary  -     Therapy Frequency (OT) 3 times/wk  -     Row Name 04/07/23 1315          Positioning and Restraints    Pre-Treatment Position in bed  -CH     Post Treatment Position bed  -CH     In Bed fowlers;call light within reach;encouraged to call for assist;side rails up x2  -           User Key  (r) = Recorded By, (t) = Taken By, (c) = Cosigned By    Initials Name Provider Type     Jerica Hanna, OTR/L Occupational Therapist               Outcome Measures     Row Name 04/07/23 1315          How much help from another is currently needed...    Putting on and taking off regular lower body clothing? 2  -CH     Bathing (including washing, rinsing, and drying) 2  -CH     Toileting (which includes using toilet bed pan or urinal) 3  -CH     Putting on and taking off regular upper body clothing 3  -CH     Taking care of personal grooming (such as brushing teeth) 4  -CH     Eating meals 4  -CH     AM-PAC 6 Clicks Score (OT) 18  -CH     Row Name 04/07/23 0822 04/07/23 0800       How much help from another person do you currently need...    Turning from your back to your side while in flat bed without using bedrails? 4  -TB 3  -KS    Moving from lying on back to sitting on the side of a flat bed without bedrails? 4  -TB 3  -KS    Moving to and from a bed to a chair (including a wheelchair)? --  Defer right now  -TB 2  -KS    Standing up from a chair using your arms (e.g., wheelchair, bedside chair)? --  Defer right now  -TB 2  -KS    Climbing 3-5 steps with a railing? --  Defer right now  -TB 2  -KS    To walk in hospital room? --  Defer right now  -TB 2  -KS    AM-PAC 6 Clicks Score (PT) -- 14  -KS    Highest level of mobility -- 4 --> Transferred to chair/commode  -KS    Row Name 04/07/23 131  04/07/23 0822       Functional Assessment    Outcome Measure Options AM-PAC 6 Clicks Daily Activity (OT)  -CH AM-PAC 6 Clicks Basic Mobility (PT)  -TB          User Key  (r) = Recorded By, (t) = Taken By, (c) = Cosigned By    Initials Name Provider Type    Jerica Rivera, OTR/L Occupational Therapist    Joanne Rollins, RN Registered Nurse    Tere Berger, KYLAH Physical Therapist Assistant                Occupational Therapy Education     Title: PT OT SLP Therapies (Done)     Topic: Occupational Therapy (Done)     Point: ADL training (Done)     Description:   Instruct learner(s) on proper safety adaptation and remediation techniques during self care or transfers.   Instruct in proper use of assistive devices.              Learning Progress Summary           Patient Acceptance, E,TB, VU by CM at 4/6/2023 1350    Acceptance, E, VU by JJ at 4/5/2023 1245    Acceptance, E, VU by JJ at 4/3/2023 0811    Acceptance, E, VU,NR by CS at 4/1/2023 1445   Family Acceptance, E,TB, VU by CM at 4/6/2023 1350                   Point: Home exercise program (Done)     Description:   Instruct learner(s) on appropriate technique for monitoring, assisting and/or progressing therapeutic exercises/activities.              Learning Progress Summary           Patient Acceptance, E,TB, VU by CM at 4/6/2023 1350    Acceptance, E, VU,NR by CS at 4/1/2023 1445   Family Acceptance, E,TB, VU by CM at 4/6/2023 1350                   Point: Precautions (Done)     Description:   Instruct learner(s) on prescribed precautions during self-care and functional transfers.              Learning Progress Summary           Patient Acceptance, E,TB, VU by CM at 4/6/2023 1350    Acceptance, E, VU by JJ at 4/5/2023 1245    Acceptance, E, VU by JJ at 4/3/2023 0811    Acceptance, E, VU,NR by CS at 4/1/2023 1445   Family Acceptance, E,TB, VU by CM at 4/6/2023 1350                   Point: Body mechanics (Done)     Description:   Instruct learner(s)  on proper positioning and spine alignment during self-care, functional mobility activities and/or exercises.              Learning Progress Summary           Patient Acceptance, E,TB, VU by AMY at 4/6/2023 1350    Acceptance, E, VU by MENDEL at 4/5/2023 1245    Acceptance, E, VU by MENDEL at 4/3/2023 0811    Acceptance, E, VU,NR by GABRIELA at 4/1/2023 1445   Family Acceptance, E,TB, VU by AMY at 4/6/2023 1350                               User Key     Initials Effective Dates Name Provider Type Discipline     02/03/23 -  Coretta Siddiqi OTR/L, CNT Occupational Therapist OT    AMY 03/02/23 -  Dianelys Carlton, RN Registered Nurse Nurse    MENDEL 11/10/21 -  Coleen Andres OTR/L, CSRS Occupational Therapist OT              OT Recommendation and Plan  Therapy Frequency (OT): 3 times/wk  Plan of Care Review  Plan of Care Reviewed With: patient  Progress: no change  Outcome Evaluation: Pt drain & dressing intact & helmet does not fit. Assisted pt to EOB & completed BUE AROM ex.  Pt limited to bed tasks 2' to helmet not fitting.  Pt is very participatory, pleasant, & able to follow commands.  Cont OT tx     Time Calculation:    Time Calculation- OT     Row Name 04/07/23 1315             Time Calculation- OT    OT Start Time 1315  -CH      OT Stop Time 1328  -CH      OT Time Calculation (min) 13 min  -CH      Total Timed Code Minutes- OT 13 minute(s)  -CH      OT Received On 04/07/23  -CH         Timed Charges    81006 - OT Therapeutic Activity Minutes 13  -CH         Total Minutes    Timed Charges Total Minutes 13  -CH       Total Minutes 13  -CH            User Key  (r) = Recorded By, (t) = Taken By, (c) = Cosigned By    Initials Name Provider Type     Jerica Hanna OTR/L Occupational Therapist              Therapy Charges for Today     Code Description Service Date Service Provider Modifiers Qty    [id number]  OT THERAPEUTIC ACT EA 15 MIN 4/7/2023 Jerica Hanna OTR/L GO 1               PETER Dick/NALLELY  4/7/2023   no previous reaction

## 2023-04-13 ENCOUNTER — APPOINTMENT (OUTPATIENT)
Dept: OPHTHALMOLOGY | Facility: CLINIC | Age: 63
End: 2023-04-13
Payer: MEDICAID

## 2023-04-13 ENCOUNTER — NON-APPOINTMENT (OUTPATIENT)
Age: 63
End: 2023-04-13

## 2023-04-13 PROCEDURE — 92012 INTRM OPH EXAM EST PATIENT: CPT

## 2023-04-13 PROCEDURE — 92134 CPTRZ OPH DX IMG PST SGM RTA: CPT

## 2023-04-26 ENCOUNTER — APPOINTMENT (OUTPATIENT)
Dept: HEPATOLOGY | Facility: CLINIC | Age: 63
End: 2023-04-26
Payer: MEDICAID

## 2023-04-26 PROCEDURE — 90636 HEP A/HEP B VACC ADULT IM: CPT

## 2023-04-26 PROCEDURE — 90471 IMMUNIZATION ADMIN: CPT

## 2023-05-15 ENCOUNTER — APPOINTMENT (OUTPATIENT)
Dept: CARDIOLOGY | Facility: CLINIC | Age: 63
End: 2023-05-15

## 2023-06-13 NOTE — H&P PST ADULT - CORNEAL ABRASION RISK
no change in level of consciousness/no confusion/no fever/no loss of consciousness/no numbness/no vomiting dry eyes , OTC drops

## 2023-06-19 ENCOUNTER — APPOINTMENT (OUTPATIENT)
Dept: HEPATOLOGY | Facility: CLINIC | Age: 63
End: 2023-06-19
Payer: MEDICAID

## 2023-06-19 VITALS
DIASTOLIC BLOOD PRESSURE: 98 MMHG | BODY MASS INDEX: 23.14 KG/M2 | SYSTOLIC BLOOD PRESSURE: 163 MMHG | WEIGHT: 144 LBS | HEART RATE: 79 BPM | HEIGHT: 66 IN | OXYGEN SATURATION: 99 %

## 2023-06-19 VITALS — DIASTOLIC BLOOD PRESSURE: 88 MMHG | SYSTOLIC BLOOD PRESSURE: 148 MMHG

## 2023-06-19 DIAGNOSIS — I85.00 ESOPHAGEAL VARICES W/OUT BLEEDING: ICD-10-CM

## 2023-06-19 PROCEDURE — 99214 OFFICE O/P EST MOD 30 MIN: CPT

## 2023-06-19 NOTE — PHYSICAL EXAM
[Scleral Icterus] : No Scleral Icterus [Abdominal  Ascites] : no ascites [Asterixis] : no asterixis observed [Jaundice] : No jaundice [Palmar Erythema] : no Palmar Erythema [General Appearance - Alert] : alert [General Appearance - In No Acute Distress] : in no acute distress [Sclera] : the sclera and conjunctiva were normal [Respiration, Rhythm And Depth] : normal respiratory rhythm and effort [Edema] : there was no peripheral edema [Abdomen Soft] : soft [Abdomen Tenderness] : non-tender [] : no hepato-splenomegaly [FreeTextEntry1] : reducible umbilical hernia [No Focal Deficits] : no focal deficits [Oriented To Time, Place, And Person] : oriented to person, place, and time [Affect] : the affect was normal

## 2023-06-19 NOTE — ASSESSMENT
[FreeTextEntry1] : Mr. Matthew Howard 62 yoM here for follow up for cirrhosis presumed secondary to AUD. \par \par #AUD\par -Long history of heavy alcohol use now having occasional beer. \par - I have emphasized again the importance ofcomplete alcohol  abstinence and that no amount or type of alcohol is safe for his liver. \par \par # Compensated Cirrhosis \par -Low MELD NA\par -Ascites:None\par -EV:  EGD on 11/12/19 showed nonbleeding large (>5mm) esophageal varices in lower and mid esophagus, portal hypertensive gastropathy.  No EGD since 2019. Not on NSBB. He agrees to have this done with our office, was ordered in Sept 2022 but  did not call .Will contact  regarding EGD/colonoscopy appt. \par -Hepatic encephalopathy: none \par -HCC: neg on MRI 1/13/22 and abdo US 3/23/23 . AFP 4.6 on BW 3/21/23\par \par \par I discussed the meaning of cirrhosis with the patient. I reviewed the natural history of the disease. I explained the risks for the development of esophageal varices with and without bleeding, hepatic encephalopathy, ascites, hepatocellular carcinoma, and liver failure. I have explained that disease can progress to the point of requiring an evaluation for liver transplantation. I have explained the need for imaging every 6 months to screen for liver cancer.\par \par #Health maintenance in cirrhosis\par -Recommended vaccination for hep A and B since non immune. He received 2 doses and is scheduled in sept for last dose. \par -Patient was counseled to: abstain from alcohol and all illicit drugs; avoid use of herbal and dietary supplements due to potential hepatotoxicity; limit use of acetaminophen to <2 grams per day; avoid use of nonsteroidal antiinflammatory drugs (NSAIDs) as these can lead to diuretic resistance and precipitate renal dysfunction in patients with advanced liver disease; avoid eating any unpasteurized dairy products; avoid eating any raw or undercooked eggs, fish, poultry, or meat; and avoid eating raw/steamed oysters or other shellfish to avoid risk of Vibrio infection.\par -Need repeat EGD to screen for esophageal varices. \par -Reports never had colonoscopy in the past, recommended one to screen for colon cancer at the same time as EGD. \par \par Plan:\par BW and will call to review. Schedule EGD/colonoscopy, F/U in 4 month with repeat BW and US. \par \par \par

## 2023-06-20 ENCOUNTER — NON-APPOINTMENT (OUTPATIENT)
Age: 63
End: 2023-06-20

## 2023-06-20 LAB
ALBUMIN SERPL ELPH-MCNC: 5 G/DL
ALP BLD-CCNC: 116 U/L
ALT SERPL-CCNC: 19 U/L
ANION GAP SERPL CALC-SCNC: 12 MMOL/L
AST SERPL-CCNC: 25 U/L
BILIRUB SERPL-MCNC: 0.4 MG/DL
BUN SERPL-MCNC: 17 MG/DL
CALCIUM SERPL-MCNC: 9.5 MG/DL
CHLORIDE SERPL-SCNC: 98 MMOL/L
CO2 SERPL-SCNC: 25 MMOL/L
CREAT SERPL-MCNC: 0.76 MG/DL
EGFR: 101 ML/MIN/1.73M2
INR PPP: 1.1 RATIO
POTASSIUM SERPL-SCNC: 4.2 MMOL/L
PROT SERPL-MCNC: 7.6 G/DL
PT BLD: 12.9 SEC
SODIUM SERPL-SCNC: 136 MMOL/L

## 2023-09-11 ENCOUNTER — APPOINTMENT (OUTPATIENT)
Age: 63
End: 2023-09-11
Payer: COMMERCIAL

## 2023-09-11 PROCEDURE — D0330 PANORAMIC RADIOGRAPHIC IMAGE: CPT

## 2023-09-11 PROCEDURE — 99024 POSTOP FOLLOW-UP VISIT: CPT

## 2023-09-18 ENCOUNTER — APPOINTMENT (OUTPATIENT)
Dept: HEPATOLOGY | Facility: HOSPITAL | Age: 63
End: 2023-09-18

## 2023-09-27 ENCOUNTER — APPOINTMENT (OUTPATIENT)
Dept: HEPATOLOGY | Facility: CLINIC | Age: 63
End: 2023-09-27
Payer: MEDICAID

## 2023-09-27 DIAGNOSIS — Z23 ENCOUNTER FOR IMMUNIZATION: ICD-10-CM

## 2023-09-27 PROCEDURE — 90471 IMMUNIZATION ADMIN: CPT

## 2023-09-27 PROCEDURE — 90636 HEP A/HEP B VACC ADULT IM: CPT

## 2023-10-16 ENCOUNTER — APPOINTMENT (OUTPATIENT)
Age: 63
End: 2023-10-16
Payer: MEDICAID

## 2023-10-16 PROCEDURE — 99024 POSTOP FOLLOW-UP VISIT: CPT

## 2023-11-09 ENCOUNTER — APPOINTMENT (OUTPATIENT)
Dept: OPHTHALMOLOGY | Facility: CLINIC | Age: 63
End: 2023-11-09
Payer: MEDICAID

## 2023-11-09 ENCOUNTER — NON-APPOINTMENT (OUTPATIENT)
Age: 63
End: 2023-11-09

## 2023-11-09 PROCEDURE — 92134 CPTRZ OPH DX IMG PST SGM RTA: CPT

## 2023-11-09 PROCEDURE — 92014 COMPRE OPH EXAM EST PT 1/>: CPT

## 2023-11-20 ENCOUNTER — APPOINTMENT (OUTPATIENT)
Dept: HEPATOLOGY | Facility: CLINIC | Age: 63
End: 2023-11-20
Payer: MEDICAID

## 2023-11-20 VITALS
WEIGHT: 148 LBS | DIASTOLIC BLOOD PRESSURE: 79 MMHG | HEIGHT: 66 IN | BODY MASS INDEX: 23.78 KG/M2 | SYSTOLIC BLOOD PRESSURE: 134 MMHG | HEART RATE: 84 BPM | OXYGEN SATURATION: 97 %

## 2023-11-20 DIAGNOSIS — K74.60 UNSPECIFIED CIRRHOSIS OF LIVER: ICD-10-CM

## 2023-11-20 DIAGNOSIS — Z12.11 ENCOUNTER FOR SCREENING FOR MALIGNANT NEOPLASM OF COLON: ICD-10-CM

## 2023-11-20 PROCEDURE — 99214 OFFICE O/P EST MOD 30 MIN: CPT

## 2023-11-20 RX ORDER — PEG-3350, SODIUM SULFATE, SODIUM CHLORIDE, POTASSIUM CHLORIDE, SODIUM ASCORBATE AND ASCORBIC ACID 7.5-2.691G
100 KIT ORAL
Qty: 1 | Refills: 0 | Status: ACTIVE | COMMUNITY
Start: 2023-11-20 | End: 1900-01-01

## 2023-11-21 LAB
AFP-TM SERPL-MCNC: 5 NG/ML
ALBUMIN SERPL ELPH-MCNC: 5.2 G/DL
ALP BLD-CCNC: 123 U/L
ALT SERPL-CCNC: 44 U/L
ANION GAP SERPL CALC-SCNC: 15 MMOL/L
AST SERPL-CCNC: 33 U/L
BILIRUB SERPL-MCNC: 0.5 MG/DL
BUN SERPL-MCNC: 11 MG/DL
CALCIUM SERPL-MCNC: 10.1 MG/DL
CHLORIDE SERPL-SCNC: 95 MMOL/L
CO2 SERPL-SCNC: 24 MMOL/L
CREAT SERPL-MCNC: 0.68 MG/DL
EGFR: 104 ML/MIN/1.73M2
HCT VFR BLD CALC: 41.8 %
HGB BLD-MCNC: 14.2 G/DL
INR PPP: 1.07 RATIO
MCHC RBC-ENTMCNC: 31.1 PG
MCHC RBC-ENTMCNC: 34 GM/DL
MCV RBC AUTO: 91.5 FL
PLATELET # BLD AUTO: 120 K/UL
POTASSIUM SERPL-SCNC: 3.9 MMOL/L
PROT SERPL-MCNC: 8.3 G/DL
PT BLD: 12.2 SEC
RBC # BLD: 4.57 M/UL
RBC # FLD: 12.4 %
SODIUM SERPL-SCNC: 135 MMOL/L
WBC # FLD AUTO: 8.72 K/UL

## 2023-11-27 ENCOUNTER — APPOINTMENT (OUTPATIENT)
Age: 63
End: 2023-11-27
Payer: SELF-PAY

## 2023-11-27 PROCEDURE — D6010: CPT

## 2023-11-27 PROCEDURE — D7950: CPT

## 2023-11-29 NOTE — PRE-ANESTHESIA EVALUATION ADULT - NSATTENDATTESTRD_GEN_ALL_CORE
The patient has been re-examined and I agree with the above assessment or I updated with my findings.
Declines

## 2023-12-08 ENCOUNTER — APPOINTMENT (OUTPATIENT)
Age: 63
End: 2023-12-08
Payer: COMMERCIAL

## 2023-12-08 PROCEDURE — 99024 POSTOP FOLLOW-UP VISIT: CPT

## 2023-12-15 ENCOUNTER — APPOINTMENT (OUTPATIENT)
Dept: HEPATOLOGY | Facility: HOSPITAL | Age: 63
End: 2023-12-15

## 2023-12-29 ENCOUNTER — APPOINTMENT (OUTPATIENT)
Age: 63
End: 2023-12-29
Payer: MEDICAID

## 2023-12-29 PROCEDURE — 99024 POSTOP FOLLOW-UP VISIT: CPT

## 2023-12-31 PROBLEM — Z23 NEED FOR 23-POLYVALENT PNEUMOCOCCAL POLYSACCHARIDE VACCINE: Status: RESOLVED | Noted: 2021-11-08 | Resolved: 2023-01-17

## 2024-01-19 ENCOUNTER — NON-APPOINTMENT (OUTPATIENT)
Age: 64
End: 2024-01-19

## 2024-01-19 ENCOUNTER — APPOINTMENT (OUTPATIENT)
Dept: FAMILY MEDICINE | Facility: CLINIC | Age: 64
End: 2024-01-19
Payer: MEDICAID

## 2024-01-19 VITALS
HEART RATE: 86 BPM | WEIGHT: 147 LBS | OXYGEN SATURATION: 98 % | HEIGHT: 66 IN | DIASTOLIC BLOOD PRESSURE: 70 MMHG | TEMPERATURE: 97.8 F | BODY MASS INDEX: 23.63 KG/M2 | RESPIRATION RATE: 16 BRPM | SYSTOLIC BLOOD PRESSURE: 124 MMHG

## 2024-01-19 DIAGNOSIS — E55.9 VITAMIN D DEFICIENCY, UNSPECIFIED: ICD-10-CM

## 2024-01-19 DIAGNOSIS — K70.30 ALCOHOLIC CIRRHOSIS OF LIVER W/OUT ASCITES: ICD-10-CM

## 2024-01-19 DIAGNOSIS — F17.210 NICOTINE DEPENDENCE, CIGARETTES, UNCOMPLICATED: ICD-10-CM

## 2024-01-19 DIAGNOSIS — L98.9 DISORDER OF THE SKIN AND SUBCUTANEOUS TISSUE, UNSPECIFIED: ICD-10-CM

## 2024-01-19 DIAGNOSIS — E78.5 HYPERLIPIDEMIA, UNSPECIFIED: ICD-10-CM

## 2024-01-19 DIAGNOSIS — Z00.00 ENCOUNTER FOR GENERAL ADULT MEDICAL EXAMINATION W/OUT ABNORMAL FINDINGS: ICD-10-CM

## 2024-01-19 DIAGNOSIS — E11.9 TYPE 2 DIABETES MELLITUS W/OUT COMPLICATIONS: ICD-10-CM

## 2024-01-19 PROCEDURE — 99396 PREV VISIT EST AGE 40-64: CPT | Mod: 25

## 2024-01-19 PROCEDURE — 99406 BEHAV CHNG SMOKING 3-10 MIN: CPT

## 2024-01-19 PROCEDURE — 93000 ELECTROCARDIOGRAM COMPLETE: CPT

## 2024-01-19 RX ORDER — VARENICLINE TARTRATE 0.5 (11)-1
0.5 MG X 11 & KIT ORAL
Qty: 1 | Refills: 2 | Status: ACTIVE | COMMUNITY
Start: 2024-01-19 | End: 1900-01-01

## 2024-01-19 NOTE — COUNSELING
[Use of nicotine replacement therapies and other medications discussed] : Use of nicotine replacement therapies and other medications discussed [Yes] : Willing to quit smoking [FreeTextEntry1] : 5

## 2024-01-19 NOTE — HEALTH RISK ASSESSMENT
[Good] : ~his/her~  mood as  good [No] : In the past 12 months have you used drugs other than those required for medical reasons? No [With Family] : lives with family [# of Members in Household ___] :  household currently consist of [unfilled] member(s) [Employed] : employed [] :  [# Of Children ___] : has [unfilled] children [Feels Safe at Home] : Feels safe at home [Fully functional (bathing, dressing, toileting, transferring, walking, feeding)] : Fully functional (bathing, dressing, toileting, transferring, walking, feeding) [Fully functional (using the telephone, shopping, preparing meals, housekeeping, doing laundry, using] : Fully functional and needs no help or supervision to perform IADLs (using the telephone, shopping, preparing meals, housekeeping, doing laundry, using transportation, managing medications and managing finances) [20 or more] : 20 or more [de-identified] : states drinks non alcohol beers on social occasions [Change in mental status noted] : No change in mental status noted [Language] : denies difficulty with language [Handling Complex Tasks] : denies difficulty handling complex tasks [Reports changes in hearing] : Reports no changes in hearing [Reports changes in vision] : Reports no changes in vision [Reports changes in dental health] : Reports no changes in dental health [ColonoscopyDate] : 12/23 [de-identified] : wife and 14 yo son [FreeTextEntry2] : construction

## 2024-01-22 ENCOUNTER — APPOINTMENT (OUTPATIENT)
Dept: HEPATOLOGY | Facility: HOSPITAL | Age: 64
End: 2024-01-22

## 2024-01-22 LAB
25(OH)D3 SERPL-MCNC: 27.7 NG/ML
ALBUMIN SERPL ELPH-MCNC: 4.6 G/DL
ALP BLD-CCNC: 122 U/L
ALT SERPL-CCNC: 27 U/L
ANION GAP SERPL CALC-SCNC: 13 MMOL/L
APPEARANCE: CLEAR
AST SERPL-CCNC: 26 U/L
BASOPHILS # BLD AUTO: 0.04 K/UL
BASOPHILS NFR BLD AUTO: 0.6 %
BILIRUB SERPL-MCNC: 0.4 MG/DL
BILIRUBIN URINE: NEGATIVE
BLOOD URINE: NEGATIVE
BUN SERPL-MCNC: 12 MG/DL
CALCIUM SERPL-MCNC: 9.3 MG/DL
CHLORIDE SERPL-SCNC: 98 MMOL/L
CHOLEST SERPL-MCNC: 146 MG/DL
CO2 SERPL-SCNC: 25 MMOL/L
COLOR: YELLOW
CREAT SERPL-MCNC: 0.63 MG/DL
EGFR: 106 ML/MIN/1.73M2
EOSINOPHIL # BLD AUTO: 0.31 K/UL
EOSINOPHIL NFR BLD AUTO: 4.6 %
ESTIMATED AVERAGE GLUCOSE: 157 MG/DL
GLUCOSE QUALITATIVE U: NEGATIVE MG/DL
GLUCOSE SERPL-MCNC: 122 MG/DL
HBA1C MFR BLD HPLC: 7.1 %
HCT VFR BLD CALC: 42.9 %
HDLC SERPL-MCNC: 38 MG/DL
HGB BLD-MCNC: 13.9 G/DL
IMM GRANULOCYTES NFR BLD AUTO: 0.1 %
KETONES URINE: NEGATIVE MG/DL
LDLC SERPL CALC-MCNC: 97 MG/DL
LEUKOCYTE ESTERASE URINE: NEGATIVE
LYMPHOCYTES # BLD AUTO: 2.18 K/UL
LYMPHOCYTES NFR BLD AUTO: 32.1 %
MAN DIFF?: NORMAL
MCHC RBC-ENTMCNC: 31.1 PG
MCHC RBC-ENTMCNC: 32.4 GM/DL
MCV RBC AUTO: 96 FL
MONOCYTES # BLD AUTO: 0.53 K/UL
MONOCYTES NFR BLD AUTO: 7.8 %
NEUTROPHILS # BLD AUTO: 3.72 K/UL
NEUTROPHILS NFR BLD AUTO: 54.8 %
NITRITE URINE: NEGATIVE
NONHDLC SERPL-MCNC: 108 MG/DL
PH URINE: 7
PLATELET # BLD AUTO: 142 K/UL
POTASSIUM SERPL-SCNC: 4.4 MMOL/L
PROT SERPL-MCNC: 7.6 G/DL
PROTEIN URINE: NEGATIVE MG/DL
PSA SERPL-MCNC: 1.97 NG/ML
RBC # BLD: 4.47 M/UL
RBC # FLD: 12.9 %
SODIUM SERPL-SCNC: 136 MMOL/L
SPECIFIC GRAVITY URINE: 1.01
TESTOST SERPL-MCNC: 680 NG/DL
TRIGL SERPL-MCNC: 52 MG/DL
TSH SERPL-ACNC: 1.11 UIU/ML
UROBILINOGEN URINE: 0.2 MG/DL
WBC # FLD AUTO: 6.79 K/UL

## 2024-03-11 ENCOUNTER — APPOINTMENT (OUTPATIENT)
Dept: HEPATOLOGY | Facility: CLINIC | Age: 64
End: 2024-03-11

## 2024-03-29 ENCOUNTER — APPOINTMENT (OUTPATIENT)
Age: 64
End: 2024-03-29
Payer: COMMERCIAL

## 2024-03-29 PROCEDURE — 99024 POSTOP FOLLOW-UP VISIT: CPT

## 2024-03-29 PROCEDURE — D0210: CPT

## 2024-03-29 PROCEDURE — D0150: CPT

## 2024-03-29 PROCEDURE — D1110 PROPHYLAXIS - ADULT: CPT

## 2024-05-01 ENCOUNTER — RX RENEWAL (OUTPATIENT)
Age: 64
End: 2024-05-01

## 2024-05-01 RX ORDER — ADHESIVE TAPE 3"X 2.3 YD
50 MCG TAPE, NON-MEDICATED TOPICAL
Qty: 90 | Refills: 1 | Status: ACTIVE | COMMUNITY
Start: 2022-07-28 | End: 1900-01-01

## 2024-05-29 ENCOUNTER — APPOINTMENT (OUTPATIENT)
Age: 64
End: 2024-05-29
Payer: SELF-PAY

## 2024-05-29 PROCEDURE — D6065: CPT

## 2024-06-11 ENCOUNTER — APPOINTMENT (OUTPATIENT)
Age: 64
End: 2024-06-11

## 2024-06-11 PROCEDURE — D4341: CPT

## 2024-06-18 ENCOUNTER — APPOINTMENT (OUTPATIENT)
Age: 64
End: 2024-06-18

## 2024-06-18 PROCEDURE — D6065: CPT

## 2024-06-21 ENCOUNTER — APPOINTMENT (OUTPATIENT)
Age: 64
End: 2024-06-21

## 2024-06-24 ENCOUNTER — APPOINTMENT (OUTPATIENT)
Age: 64
End: 2024-06-24
Payer: COMMERCIAL

## 2024-06-24 PROCEDURE — D9310: CPT

## 2024-07-09 ENCOUNTER — APPOINTMENT (OUTPATIENT)
Age: 64
End: 2024-07-09

## 2024-07-19 ENCOUNTER — APPOINTMENT (OUTPATIENT)
Age: 64
End: 2024-07-19

## 2024-07-19 PROCEDURE — D6080: CPT

## 2024-07-25 NOTE — PHYSICAL EXAM
Price (Do Not Change): 0.00 [Well Developed] : well developed Detail Level: Simple [Well Nourished] : well nourished Instructions: This plan will send the code FBSE to the PM system.  DO NOT or CHANGE the price. [No Acute Distress] : no acute distress [Normal Conjunctiva] : normal conjunctiva [Normal Venous Pressure] : normal venous pressure [No Carotid Bruit] : no carotid bruit [Normal S1, S2] : normal S1, S2 [No Murmur] : no murmur [No Rub] : no rub [No Gallop] : no gallop [Clear Lung Fields] : clear lung fields [Good Air Entry] : good air entry [No Respiratory Distress] : no respiratory distress  [Soft] : abdomen soft [Non Tender] : non-tender [No Masses/organomegaly] : no masses/organomegaly [Normal Bowel Sounds] : normal bowel sounds [Normal Gait] : normal gait [No Edema] : no edema [No Cyanosis] : no cyanosis [No Clubbing] : no clubbing [No Varicosities] : no varicosities [No Rash] : no rash [No Skin Lesions] : no skin lesions [Moves all extremities] : moves all extremities [No Focal Deficits] : no focal deficits [Normal Speech] : normal speech [Alert and Oriented] : alert and oriented [Normal memory] : normal memory

## 2024-08-26 ENCOUNTER — APPOINTMENT (OUTPATIENT)
Age: 64
End: 2024-08-26
Payer: MEDICAID

## 2024-08-26 PROCEDURE — 21210 FACE BONE GRAFT: CPT

## 2024-08-31 ENCOUNTER — EMERGENCY (EMERGENCY)
Facility: HOSPITAL | Age: 64
LOS: 1 days | Discharge: HOME | End: 2024-08-31
Attending: EMERGENCY MEDICINE | Admitting: EMERGENCY MEDICINE
Payer: MEDICAID

## 2024-08-31 VITALS
TEMPERATURE: 97 F | HEART RATE: 62 BPM | SYSTOLIC BLOOD PRESSURE: 120 MMHG | OXYGEN SATURATION: 100 % | RESPIRATION RATE: 18 BRPM | DIASTOLIC BLOOD PRESSURE: 75 MMHG

## 2024-08-31 VITALS
OXYGEN SATURATION: 99 % | RESPIRATION RATE: 16 BRPM | SYSTOLIC BLOOD PRESSURE: 146 MMHG | HEIGHT: 66 IN | DIASTOLIC BLOOD PRESSURE: 71 MMHG | TEMPERATURE: 98 F | HEART RATE: 66 BPM | WEIGHT: 145.06 LBS

## 2024-08-31 DIAGNOSIS — Z90.49 ACQUIRED ABSENCE OF OTHER SPECIFIED PARTS OF DIGESTIVE TRACT: Chronic | ICD-10-CM

## 2024-08-31 DIAGNOSIS — Z98.890 OTHER SPECIFIED POSTPROCEDURAL STATES: Chronic | ICD-10-CM

## 2024-08-31 LAB
ALBUMIN SERPL ELPH-MCNC: 4.3 G/DL — SIGNIFICANT CHANGE UP (ref 3.3–5)
ALP SERPL-CCNC: 94 U/L — SIGNIFICANT CHANGE UP (ref 40–120)
ALT FLD-CCNC: 19 U/L — SIGNIFICANT CHANGE UP (ref 4–41)
ANION GAP SERPL CALC-SCNC: 13 MMOL/L — SIGNIFICANT CHANGE UP (ref 7–14)
APTT BLD: 34.9 SEC — SIGNIFICANT CHANGE UP (ref 24.5–35.6)
AST SERPL-CCNC: 24 U/L — SIGNIFICANT CHANGE UP (ref 4–40)
BASOPHILS # BLD AUTO: 0.03 K/UL — SIGNIFICANT CHANGE UP (ref 0–0.2)
BASOPHILS NFR BLD AUTO: 0.4 % — SIGNIFICANT CHANGE UP (ref 0–2)
BILIRUB SERPL-MCNC: 0.4 MG/DL — SIGNIFICANT CHANGE UP (ref 0.2–1.2)
BUN SERPL-MCNC: 13 MG/DL — SIGNIFICANT CHANGE UP (ref 7–23)
CALCIUM SERPL-MCNC: 9.7 MG/DL — SIGNIFICANT CHANGE UP (ref 8.4–10.5)
CHLORIDE SERPL-SCNC: 102 MMOL/L — SIGNIFICANT CHANGE UP (ref 98–107)
CO2 SERPL-SCNC: 24 MMOL/L — SIGNIFICANT CHANGE UP (ref 22–31)
CREAT SERPL-MCNC: 0.68 MG/DL — SIGNIFICANT CHANGE UP (ref 0.5–1.3)
EGFR: 103 ML/MIN/1.73M2 — SIGNIFICANT CHANGE UP
EOSINOPHIL # BLD AUTO: 0.33 K/UL — SIGNIFICANT CHANGE UP (ref 0–0.5)
EOSINOPHIL NFR BLD AUTO: 4.3 % — SIGNIFICANT CHANGE UP (ref 0–6)
GLUCOSE SERPL-MCNC: 101 MG/DL — HIGH (ref 70–99)
HCT VFR BLD CALC: 41.7 % — SIGNIFICANT CHANGE UP (ref 39–50)
HGB BLD-MCNC: 14.1 G/DL — SIGNIFICANT CHANGE UP (ref 13–17)
IANC: 4.55 K/UL — SIGNIFICANT CHANGE UP (ref 1.8–7.4)
IMM GRANULOCYTES NFR BLD AUTO: 0.3 % — SIGNIFICANT CHANGE UP (ref 0–0.9)
INR BLD: 1.1 RATIO — SIGNIFICANT CHANGE UP (ref 0.85–1.18)
LYMPHOCYTES # BLD AUTO: 2.22 K/UL — SIGNIFICANT CHANGE UP (ref 1–3.3)
LYMPHOCYTES # BLD AUTO: 29.2 % — SIGNIFICANT CHANGE UP (ref 13–44)
MCHC RBC-ENTMCNC: 31.9 PG — SIGNIFICANT CHANGE UP (ref 27–34)
MCHC RBC-ENTMCNC: 33.8 GM/DL — SIGNIFICANT CHANGE UP (ref 32–36)
MCV RBC AUTO: 94.3 FL — SIGNIFICANT CHANGE UP (ref 80–100)
MONOCYTES # BLD AUTO: 0.45 K/UL — SIGNIFICANT CHANGE UP (ref 0–0.9)
MONOCYTES NFR BLD AUTO: 5.9 % — SIGNIFICANT CHANGE UP (ref 2–14)
NEUTROPHILS # BLD AUTO: 4.55 K/UL — SIGNIFICANT CHANGE UP (ref 1.8–7.4)
NEUTROPHILS NFR BLD AUTO: 59.9 % — SIGNIFICANT CHANGE UP (ref 43–77)
NRBC # BLD: 0 /100 WBCS — SIGNIFICANT CHANGE UP (ref 0–0)
NRBC # FLD: 0 K/UL — SIGNIFICANT CHANGE UP (ref 0–0)
PLATELET # BLD AUTO: 126 K/UL — LOW (ref 150–400)
POTASSIUM SERPL-MCNC: 4.3 MMOL/L — SIGNIFICANT CHANGE UP (ref 3.5–5.3)
POTASSIUM SERPL-SCNC: 4.3 MMOL/L — SIGNIFICANT CHANGE UP (ref 3.5–5.3)
PROT SERPL-MCNC: 7.9 G/DL — SIGNIFICANT CHANGE UP (ref 6–8.3)
PROTHROM AB SERPL-ACNC: 12.4 SEC — SIGNIFICANT CHANGE UP (ref 9.5–13)
RBC # BLD: 4.42 M/UL — SIGNIFICANT CHANGE UP (ref 4.2–5.8)
RBC # FLD: 12.1 % — SIGNIFICANT CHANGE UP (ref 10.3–14.5)
SODIUM SERPL-SCNC: 139 MMOL/L — SIGNIFICANT CHANGE UP (ref 135–145)
WBC # BLD: 7.6 K/UL — SIGNIFICANT CHANGE UP (ref 3.8–10.5)
WBC # FLD AUTO: 7.6 K/UL — SIGNIFICANT CHANGE UP (ref 3.8–10.5)

## 2024-08-31 PROCEDURE — 99284 EMERGENCY DEPT VISIT MOD MDM: CPT

## 2024-08-31 RX ADMIN — Medication 5 MILLILITER(S): at 10:14

## 2024-08-31 NOTE — ED PROVIDER NOTE - OBJECTIVE STATEMENT
64-year-old male with past medical history of liver cirrhosis, alcohol abuse, current cigarette smoker, emphysema, diabetes presenting to emergency department after recent bone graft surgery on Monday due to persistent bleeding.  Patient states he is having surgery for dental implant, and has been having continuous bleeding since.  Patient states he had minor pain to the area controlled with the medication, but has not had any fevers, chills, body aches, significant swelling to the area.  No difficulty breathing or swallowing.

## 2024-08-31 NOTE — ED PROVIDER NOTE - CLINICAL SUMMARY MEDICAL DECISION MAKING FREE TEXT BOX
64-year-old male past medical history of alcoholic liver cirrhosis, diabetes, hypertension, cigarette smoking presenting due to bleeding from the end of the grafting site for the past 4 to 5 days.  Denies any infectious symptoms.  Well-healing wound present on upper incisor with no purulent discharge or erythema.  Due to patient's history of cirrhosis, will her CBC, CMP, and coags to evaluate for any sign of coagulopathy.  Will place TXA to attempt hemostasis.

## 2024-08-31 NOTE — ED ADULT TRIAGE NOTE - CHIEF COMPLAINT QUOTE
Pt arrives [facility] s/p bone grafting surgery to upper mouth.  Pt c/o bleeding from surgery.  Pt denies blood thinners.  Pt has packed interior mouth with gauze.  Hx: DM, cirrhosis.  fs= 108

## 2024-08-31 NOTE — ED ADULT NURSE NOTE - OBJECTIVE STATEMENT
pt came to ED for evaluation of top of mouth bleeding after dental bone graft Monday 8/26. Bleeding began 8/29. no visible bleeding at this time. pt denies any blood clots in the mouth. sts he "spit" blood out of his mouth prior to ED arrival and has been using gauze to absorb blood. pt denies pain at this time. no loose teeth. swelling noted on the lips and right side of face. pt sts the swelling has decreased since the procedure.

## 2024-08-31 NOTE — ED PROVIDER NOTE - NSFOLLOWUPINSTRUCTIONS_ED_ALL_ED_FT
You were seen in the emergency department due to bleeding after a dental procedure.  In the ED we performed labs to evaluate for any signs of coagulation disorders which came back negative.  We placed a clotting agent on your teeth with relief.  Please follow-up with your dentist within the next week for further evaluation.  Please return to the emerged department if experience worsening bleeding, swelling, fevers, chills, pus drainage, inability to speak, swallow, or breathe.

## 2024-08-31 NOTE — ED PROVIDER NOTE - PATIENT PORTAL LINK FT
You can access the FollowMyHealth Patient Portal offered by [facility] by registering at the following website: [email or web address]. By joining [facility]’s FollowMyHealth portal, you will also be able to view your health information using other applications (apps) compatible with our system.

## 2024-09-06 ENCOUNTER — APPOINTMENT (OUTPATIENT)
Age: 64
End: 2024-09-06

## 2024-09-06 PROCEDURE — D0170: CPT | Mod: NC

## 2024-09-16 ENCOUNTER — APPOINTMENT (OUTPATIENT)
Age: 64
End: 2024-09-16

## 2024-09-16 PROCEDURE — 99024 POSTOP FOLLOW-UP VISIT: CPT

## 2024-09-25 ENCOUNTER — APPOINTMENT (OUTPATIENT)
Dept: HEPATOLOGY | Facility: CLINIC | Age: 64
End: 2024-09-25
Payer: MEDICAID

## 2024-09-25 ENCOUNTER — LABORATORY RESULT (OUTPATIENT)
Age: 64
End: 2024-09-25

## 2024-09-25 VITALS
HEIGHT: 66 IN | HEART RATE: 68 BPM | SYSTOLIC BLOOD PRESSURE: 143 MMHG | BODY MASS INDEX: 23.78 KG/M2 | RESPIRATION RATE: 16 BRPM | DIASTOLIC BLOOD PRESSURE: 77 MMHG | OXYGEN SATURATION: 100 % | WEIGHT: 148 LBS

## 2024-09-25 DIAGNOSIS — R16.0 HEPATOMEGALY, NOT ELSEWHERE CLASSIFIED: ICD-10-CM

## 2024-09-25 DIAGNOSIS — K76.9 LIVER DISEASE, UNSPECIFIED: ICD-10-CM

## 2024-09-25 DIAGNOSIS — I85.00 ESOPHAGEAL VARICES W/OUT BLEEDING: ICD-10-CM

## 2024-09-25 DIAGNOSIS — K74.60 UNSPECIFIED CIRRHOSIS OF LIVER: ICD-10-CM

## 2024-09-25 DIAGNOSIS — K70.30 ALCOHOLIC CIRRHOSIS OF LIVER W/OUT ASCITES: ICD-10-CM

## 2024-09-25 PROCEDURE — 99204 OFFICE O/P NEW MOD 45 MIN: CPT

## 2024-09-26 NOTE — END OF VISIT
[FreeTextEntry3] :  I, Dr. Rob, personally performed the evaluation and management (E/M) services for this established patient who presents today with (a) new problem(s)/exacerbation of (an) existing condition(s). That E/M includes conducting the clinically appropriate interval history &/or exam, assessing all new/exacerbated conditions, and establishing a new plan of care. Today, my RADHA, [address] 45, was here to observe my evaluation and management service for this new problem/exacerbated condition and follow the plan of care established by me going forward [Time Spent: ___ minutes] : I have spent [unfilled] minutes of time on the encounter which excludes teaching and separately reported services.

## 2024-09-26 NOTE — END OF VISIT
[FreeTextEntry3] :  I, Dr. Rob, personally performed the evaluation and management (E/M) services for this established patient who presents today with (a) new problem(s)/exacerbation of (an) existing condition(s). That E/M includes conducting the clinically appropriate interval history &/or exam, assessing all new/exacerbated conditions, and establishing a new plan of care. Today, my RADHA, [employer], was here to observe my evaluation and management service for this new problem/exacerbated condition and follow the plan of care established by me going forward [Time Spent: ___ minutes] : I have spent [unfilled] minutes of time on the encounter which excludes teaching and separately reported services.

## 2024-09-26 NOTE — ASSESSMENT
[FreeTextEntry1] : Mr. Matthew Howard 63 yo M with pmhx of DM is here for follow up for cirrhosis presumed secondary to AUD. He was previously followed by QUANG Oropeza, last visit 11/2023. This is his first encounter with us.  # Compensated Cirrhosis Presume due to ALD. MELD score 7 using old labs. Will repeat MELD labs today. -Ascites: No h/o ascites, no appreciable ascites on exam today.  -EV: EGD on 11/12/19 showed nonbleeding large (>5mm) esophageal varices in lower and mid esophagus, portal hypertensive gastropathy. No EGD since 2019. Not on NSBB. Will repeat EGD for re-evaluation.  -Hepatic encephalopathy: none on exam.  -HCC: neg on MRI 1/13/22 and abdo US 3/23/23. AFP 4.6 on BW 3/21/23. Will repeat AFP and abd US.   Discussed the meaning of cirrhosis with patient. Reviewed progression of disease and its potential future complications including esophageal varices with and without bleeding, hepatic encephalopathy, ascites, HCC, and liver failure.   #AUD - Long history of heavy alcohol use, now reports drinking nonalcoholic beers during social events.  - We have emphasized again the importance of complete alcohol abstinence and that no amount or type of alcohol is safe for his liver.  - Will periodically check PETH, patient understands and agrees.   #Liver cyst CT from 2021 with indeterminate liver nodule, but MRI 2022 confirms hepatic cyst.  #HTN BP in office - 143/77 Low salt diet advised. Advised to self-monitor BP, and to consult PCP as needed for management.    Plan: MELD labs today, will also check for hep A/B immunity Abd USG for HCC screening Will schedule EGD for variceal screening  RTO in 3mo for follow up

## 2024-09-26 NOTE — ASSESSMENT
[FreeTextEntry1] : Mr. Matthew Howard 65 yo M with pmhx of DM is here for follow up for cirrhosis presumed secondary to AUD. He was previously followed by QUANG Oropeza, last visit 11/2023. This is his first encounter with us.  # Compensated Cirrhosis Presume due to ALD. MELD score 7 using old labs. Will repeat MELD labs today. -Ascites: No h/o ascites, no appreciable ascites on exam today.  -EV: EGD on 11/12/19 showed nonbleeding large (>5mm) esophageal varices in lower and mid esophagus, portal hypertensive gastropathy. No EGD since 2019. Not on NSBB. Will repeat EGD for re-evaluation.  -Hepatic encephalopathy: none on exam.  -HCC: neg on MRI 1/13/22 and abdo US 3/23/23. AFP 4.6 on BW 3/21/23. Will repeat AFP and abd US.   Discussed the meaning of cirrhosis with patient. Reviewed progression of disease and its potential future complications including esophageal varices with and without bleeding, hepatic encephalopathy, ascites, HCC, and liver failure.   #AUD - Long history of heavy alcohol use, now reports drinking nonalcoholic beers during social events.  - We have emphasized again the importance of complete alcohol abstinence and that no amount or type of alcohol is safe for his liver.  - Will periodically check PETH, patient understands and agrees.   #Liver cyst CT from 2021 with indeterminate liver nodule, but MRI 2022 confirms hepatic cyst.  #HTN BP in office - 143/77 Low salt diet advised. Advised to self-monitor BP, and to consult PCP as needed for management.    Plan: MELD labs today, will also check for hep A/B immunity Abd USG for HCC screening Will schedule EGD for variceal screening  RTO in 3mo for follow up

## 2024-09-26 NOTE — HISTORY OF PRESENT ILLNESS
[FreeTextEntry1] : Mr. Matthew Howard 65 yo M with pmhx of DM is here for follow up for cirrhosis presumed secondary to AUD. He was previously followed by QUANG Oropeza, last visit 11/2023. This is his first encounter with us.   History per Johnna's notes: - s/p mesh repair of the left inguinal hernia on 11/21/2022  - Fibroscan test on 3/21/23 F4 (24.0 kPa), S0 () - Pt was admitted at [facility] 11/12/19 for hematemesis and dark stools, at that time he had an EGD that showed nonbleeding large (>5mm) esophageal varices in lower and mid esophagus, as well as localized area of erythematous and edematous mucosa in lesser curvature with ulceration, portal hypertensive gastropathy.  No EGD since 2019. Not on NSBB.  -Abdo CT 11/24/21 showed 1.1 cm hypodense central right hepatic lesion that is indeterminate (test done w/o contrast) and is stable since 2019.  -Abdo MRI 1/13/22 showed 1.2 cm right hepatic lobe cyst, no ascites, hepatic and portal veins are patent  ----------------------  Today, he is feeling well and has no complaints. He has not followed up because his previous appt with Johnna was cancelled and it took him some time to call to reschedule an appointment. He states he now drinks nonalcoholic beers 2 cans one to two times a week when he has social gatherings. Had colonoscopy done Harleen willis this year with unremarkable findings per patient. He previously worked as a , but is currently unemployed due to lack of jobs available. Denies nausea, vomiting, diarrhea, abd pain, jaundice, confusion, or melena.   Medication: only taking metformin for his DM Surghx: appendectomy, L inguinal hernia repair 11/2022 Has 6 children

## 2024-09-26 NOTE — REVIEW OF SYSTEMS
[Negative] : Heme/Lymph [Fever] : no fever [Chills] : no chills [Abdominal Pain] : no abdominal pain [Vomiting] : no vomiting [Diarrhea] : no diarrhea [Melena] : no melena [Confused] : no confusion

## 2024-09-26 NOTE — HISTORY OF PRESENT ILLNESS
[FreeTextEntry1] : Mr. Matthew Howard 63 yo M with pmhx of DM is here for follow up for cirrhosis presumed secondary to AUD. He was previously followed by QUANG Oropeza, last visit 11/2023. This is his first encounter with us.   History per Johnna's notes: - s/p mesh repair of the left inguinal hernia on 11/21/2022  - Fibroscan test on 3/21/23 F4 (24.0 kPa), S0 () - Pt was admitted at [facility] 11/12/19 for hematemesis and dark stools, at that time he had an EGD that showed nonbleeding large (>5mm) esophageal varices in lower and mid esophagus, as well as localized area of erythematous and edematous mucosa in lesser curvature with ulceration, portal hypertensive gastropathy.  No EGD since 2019. Not on NSBB.  -Abdo CT 11/24/21 showed 1.1 cm hypodense central right hepatic lesion that is indeterminate (test done w/o contrast) and is stable since 2019.  -Abdo MRI 1/13/22 showed 1.2 cm right hepatic lobe cyst, no ascites, hepatic and portal veins are patent  ----------------------  Today, he is feeling well and has no complaints. He has not followed up because his previous appt with Johnna was cancelled and it took him some time to call to reschedule an appointment. He states he now drinks nonalcoholic beers 2 cans one to two times a week when he has social gatherings. Had colonoscopy done Harleen willis this year with unremarkable findings per patient. He previously worked as a , but is currently unemployed due to lack of jobs available. Denies nausea, vomiting, diarrhea, abd pain, jaundice, confusion, or melena.   Medication: only taking metformin for his DM Surghx: appendectomy, L inguinal hernia repair 11/2022 Has 6 children

## 2024-09-26 NOTE — PHYSICAL EXAM
[General Appearance - Alert] : alert [General Appearance - In No Acute Distress] : in no acute distress [Sclera] : the sclera and conjunctiva were normal [] : no respiratory distress [Respiration, Rhythm And Depth] : normal respiratory rhythm and effort [Edema] : there was no peripheral edema [Abdomen Soft] : soft [Abdomen Tenderness] : non-tender [Oriented To Time, Place, And Person] : oriented to person, place, and time [Affect] : the affect was normal [Scleral Icterus] : No Scleral Icterus [Abdominal  Ascites] : no ascites [Asterixis] : no asterixis observed [Jaundice] : No jaundice [Palmar Erythema] : no Palmar Erythema [FreeTextEntry1] : reducible umbilical hernia

## 2024-09-27 ENCOUNTER — APPOINTMENT (OUTPATIENT)
Dept: ULTRASOUND IMAGING | Facility: IMAGING CENTER | Age: 64
End: 2024-09-27
Payer: MEDICAID

## 2024-09-27 ENCOUNTER — OUTPATIENT (OUTPATIENT)
Dept: OUTPATIENT SERVICES | Facility: HOSPITAL | Age: 64
LOS: 1 days | End: 2024-09-27
Payer: MEDICAID

## 2024-09-27 DIAGNOSIS — Z98.890 OTHER SPECIFIED POSTPROCEDURAL STATES: Chronic | ICD-10-CM

## 2024-09-27 DIAGNOSIS — Z00.8 ENCOUNTER FOR OTHER GENERAL EXAMINATION: ICD-10-CM

## 2024-09-27 DIAGNOSIS — Z90.49 ACQUIRED ABSENCE OF OTHER SPECIFIED PARTS OF DIGESTIVE TRACT: Chronic | ICD-10-CM

## 2024-09-27 DIAGNOSIS — K70.30 ALCOHOLIC CIRRHOSIS OF LIVER WITHOUT ASCITES: ICD-10-CM

## 2024-09-27 PROCEDURE — 76700 US EXAM ABDOM COMPLETE: CPT

## 2024-09-27 PROCEDURE — 76700 US EXAM ABDOM COMPLETE: CPT | Mod: 26

## 2024-10-02 LAB
24R-OH-CALCIDIOL SERPL-MCNC: 44.6 PG/ML
ALBUMIN SERPL ELPH-MCNC: 4.6 G/DL
ALP BLD-CCNC: 115 U/L
ALPHA-1-FETOPROTEIN-L3: NORMAL %
ALPHA-1-FETOPROTEIN: 3.6 NG/ML
ALT SERPL-CCNC: 22 U/L
ANION GAP SERPL CALC-SCNC: 12 MMOL/L
AST SERPL-CCNC: 26 U/L
BILIRUB SERPL-MCNC: 0.6 MG/DL
BUN SERPL-MCNC: 9 MG/DL
CALCIUM SERPL-MCNC: 9.5 MG/DL
CHLORIDE SERPL-SCNC: 98 MMOL/L
CO2 SERPL-SCNC: 26 MMOL/L
CREAT SERPL-MCNC: 0.64 MG/DL
EGFR: 105 ML/MIN/1.73M2
ESTIMATED AVERAGE GLUCOSE: 137 MG/DL
GLUCOSE SERPL-MCNC: 127 MG/DL
HBA1C MFR BLD HPLC: 6.4 %
HBV SURFACE AB SERPL IA-ACNC: 960.8 MIU/ML
HCT VFR BLD CALC: 41.3 %
HEPATITIS A IGG ANTIBODY: REACTIVE
HGB BLD-MCNC: 13.8 G/DL
INR PPP: 1.02 RATIO
MCHC RBC-ENTMCNC: 32.1 PG
MCHC RBC-ENTMCNC: 33.4 GM/DL
MCV RBC AUTO: 96 FL
PETH 16:0/18:1: 180 NG/ML
PETH 16:0/18:2: 206 NG/ML
PETH COMMENTS: NORMAL
PLATELET # BLD AUTO: 117 K/UL
POTASSIUM SERPL-SCNC: 4.7 MMOL/L
PROT SERPL-MCNC: 7.7 G/DL
PT BLD: 12 SEC
RBC # BLD: 4.3 M/UL
RBC # FLD: 13.2 %
SODIUM SERPL-SCNC: 137 MMOL/L
WBC # FLD AUTO: 6.92 K/UL

## 2024-11-11 ENCOUNTER — APPOINTMENT (OUTPATIENT)
Age: 64
End: 2024-11-11

## 2024-11-11 PROCEDURE — 99024 POSTOP FOLLOW-UP VISIT: CPT

## 2024-12-06 ENCOUNTER — APPOINTMENT (OUTPATIENT)
Dept: HEPATOLOGY | Facility: HOSPITAL | Age: 64
End: 2024-12-06

## 2024-12-06 ENCOUNTER — TRANSCRIPTION ENCOUNTER (OUTPATIENT)
Age: 64
End: 2024-12-06

## 2024-12-06 ENCOUNTER — OUTPATIENT (OUTPATIENT)
Dept: OUTPATIENT SERVICES | Facility: HOSPITAL | Age: 64
LOS: 1 days | End: 2024-12-06
Payer: MEDICARE

## 2024-12-06 VITALS
RESPIRATION RATE: 22 BRPM | DIASTOLIC BLOOD PRESSURE: 80 MMHG | HEART RATE: 79 BPM | OXYGEN SATURATION: 100 % | SYSTOLIC BLOOD PRESSURE: 141 MMHG

## 2024-12-06 VITALS
TEMPERATURE: 98 F | HEART RATE: 74 BPM | DIASTOLIC BLOOD PRESSURE: 71 MMHG | WEIGHT: 139.99 LBS | HEIGHT: 66 IN | SYSTOLIC BLOOD PRESSURE: 129 MMHG | OXYGEN SATURATION: 97 % | RESPIRATION RATE: 14 BRPM

## 2024-12-06 DIAGNOSIS — Z90.49 ACQUIRED ABSENCE OF OTHER SPECIFIED PARTS OF DIGESTIVE TRACT: Chronic | ICD-10-CM

## 2024-12-06 DIAGNOSIS — K70.30 ALCOHOLIC CIRRHOSIS OF LIVER WITHOUT ASCITES: ICD-10-CM

## 2024-12-06 DIAGNOSIS — Z98.890 OTHER SPECIFIED POSTPROCEDURAL STATES: Chronic | ICD-10-CM

## 2024-12-06 LAB — GLUCOSE BLDC GLUCOMTR-MCNC: 84 MG/DL — SIGNIFICANT CHANGE UP (ref 70–99)

## 2024-12-06 PROCEDURE — 43235 EGD DIAGNOSTIC BRUSH WASH: CPT

## 2024-12-06 PROCEDURE — 82962 GLUCOSE BLOOD TEST: CPT

## 2024-12-06 PROCEDURE — 43239 EGD BIOPSY SINGLE/MULTIPLE: CPT | Mod: GC

## 2024-12-06 RX ORDER — CARVEDILOL 3.12 MG/1
3.12 TABLET, FILM COATED ORAL TWICE DAILY
Qty: 60 | Refills: 1 | Status: ACTIVE | COMMUNITY
Start: 2024-12-06 | End: 1900-01-01

## 2024-12-06 RX ORDER — 0.9 % SODIUM CHLORIDE 0.9 %
500 INTRAVENOUS SOLUTION INTRAVENOUS
Refills: 0 | Status: ACTIVE | OUTPATIENT
Start: 2024-12-06
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
## 2024-12-06 NOTE — ASU DISCHARGE PLAN (ADULT/PEDIATRIC) - FINANCIAL ASSISTANCE
[facility] provides services at a reduced cost to those who are determined to be eligible through [facility]’s financial assistance program. Information regarding [facility]’s financial assistance program can be found by going to [email or web address] or by calling [phone]. 53 y/o M presents to ED c/o chest pain > 4 hours.  Pt well appearing, VSS, NAD.  Negative trop x 2.  CTA chest negative. utox positive for cocaine.  Pt treated with  ASA, GI cocktail and Pepcid.      Results and diagnosis discussed with patient.  Treatment plan discussed.  Return precautions discussed.  Pt verbalized understanding and given the opportunity to ask questions.  Patient advised to follow up with cardiologist.

## 2024-12-06 NOTE — ASU PATIENT PROFILE, ADULT - FALL HARM RISK - UNIVERSAL INTERVENTIONS
Bed in lowest position, wheels locked, appropriate side rails in place/Call bell, personal items and telephone in reach/Instruct patient to call for assistance before getting out of bed or chair/Non-slip footwear when patient is out of bed/[employer] to call system/Physically safe environment - no spills, clutter or unnecessary equipment/Purposeful Proactive Rounding/Room/bathroom lighting operational, light cord in reach
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
## 2024-12-06 NOTE — ASU DISCHARGE PLAN (ADULT/PEDIATRIC) - CALL YOUR DOCTOR IF YOU HAVE ANY OF THE FOLLOWING:
Bleeding that does not stop/Pain not relieved by Medications/Fever greater than (need to indicate Fahrenheit or Celsius)/Numbness, tingling, color or temperature change to extremity/Nausea and vomiting that does not stop/Inability to tolerate liquids or foods Soolantra Pregnancy And Lactation Text: This medication is Pregnancy Category C. This medication is considered safe during breast feeding.

## 2024-12-06 NOTE — ASU PATIENT PROFILE, ADULT - NSICDXPASTMEDICALHX_GEN_ALL_CORE_FT
PAST MEDICAL HISTORY:  Alcohol abuse quit  9/10/2021    Current smoker     Emphysema lung smoker , denies exacerbations    History of cirrhosis ETOH    History of GI bleed 2019 s/p blood transfusion    Left inguinal hernia     Type 2 diabetes mellitus 2020 diet controlled, started on PO  medications January 2022  , most recent HgA1c 7.6 % ( 7/28/2022 )    Umbilical hernia 2022

## 2024-12-06 NOTE — PRE-ANESTHESIA EVALUATION ADULT - NSPROPOSEDPROCEDFT_GEN_ALL_CORE
egd 17 min spent discussing advanced care planning and pt is full code at this time. Accepts CPR and intubation on emergent basis.

## 2024-12-24 PROBLEM — F10.90 ALCOHOL USE: Status: ACTIVE | Noted: 2019-12-05

## 2025-01-02 NOTE — ASU PREOP CHECKLIST - NEEDLE GAUGE
Please decrease the furosemide 40 mg to once per day.  Start spironolactone 25 mg daily.  Blood work after 1 week from starting spironolactone.  Decrease the dose of the amiodarone to 200 mg once per day.  
22

## 2025-01-06 ENCOUNTER — APPOINTMENT (OUTPATIENT)
Dept: HEPATOLOGY | Facility: CLINIC | Age: 65
End: 2025-01-06
Payer: MEDICAID

## 2025-01-06 VITALS
OXYGEN SATURATION: 99 % | TEMPERATURE: 97.6 F | WEIGHT: 145 LBS | BODY MASS INDEX: 23.3 KG/M2 | DIASTOLIC BLOOD PRESSURE: 84 MMHG | HEART RATE: 70 BPM | RESPIRATION RATE: 16 BRPM | HEIGHT: 66 IN | SYSTOLIC BLOOD PRESSURE: 169 MMHG

## 2025-01-06 DIAGNOSIS — K74.60 UNSPECIFIED CIRRHOSIS OF LIVER: ICD-10-CM

## 2025-01-06 DIAGNOSIS — F10.90 ALCOHOL USE, UNSPECIFIED, UNCOMPLICATED: ICD-10-CM

## 2025-01-06 DIAGNOSIS — I85.00 ESOPHAGEAL VARICES W/OUT BLEEDING: ICD-10-CM

## 2025-01-06 DIAGNOSIS — K70.30 ALCOHOLIC CIRRHOSIS OF LIVER W/OUT ASCITES: ICD-10-CM

## 2025-01-06 PROCEDURE — 99214 OFFICE O/P EST MOD 30 MIN: CPT

## 2025-03-11 ENCOUNTER — LABORATORY RESULT (OUTPATIENT)
Age: 65
End: 2025-03-11

## 2025-03-11 ENCOUNTER — APPOINTMENT (OUTPATIENT)
Dept: FAMILY MEDICINE | Facility: CLINIC | Age: 65
End: 2025-03-11
Payer: MEDICAID

## 2025-03-11 VITALS
RESPIRATION RATE: 17 BRPM | BODY MASS INDEX: 23.3 KG/M2 | HEART RATE: 80 BPM | TEMPERATURE: 99.4 F | HEIGHT: 66 IN | WEIGHT: 145 LBS | OXYGEN SATURATION: 97 % | DIASTOLIC BLOOD PRESSURE: 76 MMHG | SYSTOLIC BLOOD PRESSURE: 146 MMHG

## 2025-03-11 DIAGNOSIS — R68.83 CHILLS (WITHOUT FEVER): ICD-10-CM

## 2025-03-11 DIAGNOSIS — R42 DIZZINESS AND GIDDINESS: ICD-10-CM

## 2025-03-11 PROCEDURE — 87804 INFLUENZA ASSAY W/OPTIC: CPT | Mod: 59,QW

## 2025-03-11 PROCEDURE — 99214 OFFICE O/P EST MOD 30 MIN: CPT

## 2025-03-13 DIAGNOSIS — K74.60 UNSPECIFIED CIRRHOSIS OF LIVER: ICD-10-CM

## 2025-03-13 LAB
ALBUMIN SERPL ELPH-MCNC: 4.6 G/DL
ALP BLD-CCNC: 113 U/L
ALT SERPL-CCNC: 12 U/L
ANION GAP SERPL CALC-SCNC: 15 MMOL/L
AST SERPL-CCNC: 19 U/L
BASOPHILS # BLD AUTO: 0.02 K/UL
BASOPHILS NFR BLD AUTO: 0.3 %
BILIRUB SERPL-MCNC: 0.4 MG/DL
BUN SERPL-MCNC: 8 MG/DL
CALCIUM SERPL-MCNC: 9.1 MG/DL
CHLORIDE SERPL-SCNC: 98 MMOL/L
CO2 SERPL-SCNC: 22 MMOL/L
CREAT SERPL-MCNC: 0.72 MG/DL
EGFRCR SERPLBLD CKD-EPI 2021: 101 ML/MIN/1.73M2
EOSINOPHIL # BLD AUTO: 0.04 K/UL
EOSINOPHIL NFR BLD AUTO: 0.7 %
ESTIMATED AVERAGE GLUCOSE: 157 MG/DL
GLUCOSE SERPL-MCNC: 69 MG/DL
HBA1C MFR BLD HPLC: 7.1 %
HCT VFR BLD CALC: 39.8 %
HGB BLD-MCNC: 13.6 G/DL
IMM GRANULOCYTES NFR BLD AUTO: 0.2 %
LYMPHOCYTES # BLD AUTO: 1.15 K/UL
LYMPHOCYTES NFR BLD AUTO: 20 %
MAN DIFF?: NORMAL
MCHC RBC-ENTMCNC: 32.1 PG
MCHC RBC-ENTMCNC: 34.2 G/DL
MCV RBC AUTO: 93.9 FL
MONOCYTES # BLD AUTO: 0.63 K/UL
MONOCYTES NFR BLD AUTO: 11 %
NEUTROPHILS # BLD AUTO: 3.89 K/UL
NEUTROPHILS NFR BLD AUTO: 67.8 %
PLATELET # BLD AUTO: NORMAL K/UL
POTASSIUM SERPL-SCNC: 4.8 MMOL/L
PROT SERPL-MCNC: 7.6 G/DL
RBC # BLD: 4.24 M/UL
RBC # FLD: 12.4 %
SODIUM SERPL-SCNC: 134 MMOL/L
WBC # FLD AUTO: 5.74 K/UL

## 2025-03-31 ENCOUNTER — APPOINTMENT (OUTPATIENT)
Age: 65
End: 2025-03-31

## 2025-03-31 PROCEDURE — D0367: CPT

## 2025-04-01 ENCOUNTER — APPOINTMENT (OUTPATIENT)
Dept: FAMILY MEDICINE | Facility: CLINIC | Age: 65
End: 2025-04-01
Payer: MEDICARE

## 2025-04-01 ENCOUNTER — TRANSCRIPTION ENCOUNTER (OUTPATIENT)
Age: 65
End: 2025-04-01

## 2025-04-01 VITALS
BODY MASS INDEX: 23.3 KG/M2 | WEIGHT: 145 LBS | HEIGHT: 66 IN | DIASTOLIC BLOOD PRESSURE: 80 MMHG | HEART RATE: 72 BPM | OXYGEN SATURATION: 98 % | RESPIRATION RATE: 17 BRPM | TEMPERATURE: 98.1 F | SYSTOLIC BLOOD PRESSURE: 138 MMHG

## 2025-04-01 DIAGNOSIS — Z00.00 ENCOUNTER FOR GENERAL ADULT MEDICAL EXAMINATION W/OUT ABNORMAL FINDINGS: ICD-10-CM

## 2025-04-01 DIAGNOSIS — R45.89 OTHER SYMPTOMS AND SIGNS INVOLVING EMOTIONAL STATE: ICD-10-CM

## 2025-04-01 DIAGNOSIS — E11.9 TYPE 2 DIABETES MELLITUS W/OUT COMPLICATIONS: ICD-10-CM

## 2025-04-01 DIAGNOSIS — E78.5 HYPERLIPIDEMIA, UNSPECIFIED: ICD-10-CM

## 2025-04-01 DIAGNOSIS — F17.210 NICOTINE DEPENDENCE, CIGARETTES, UNCOMPLICATED: ICD-10-CM

## 2025-04-01 DIAGNOSIS — D69.1 QUALITATIVE PLATELET DEFECTS: ICD-10-CM

## 2025-04-01 DIAGNOSIS — M89.319 HYPERTROPHY OF BONE, UNSPECIFIED SHOULDER: ICD-10-CM

## 2025-04-01 LAB
APPEARANCE: CLEAR
BILIRUBIN URINE: NEGATIVE
BLOOD URINE: NEGATIVE
COLOR: YELLOW
GLUCOSE QUALITATIVE U: NEGATIVE MG/DL
KETONES URINE: NEGATIVE MG/DL
LEUKOCYTE ESTERASE URINE: NEGATIVE
NITRITE URINE: NEGATIVE
PH URINE: 7
PROTEIN URINE: NEGATIVE MG/DL
SPECIFIC GRAVITY URINE: 1.01
UROBILINOGEN URINE: 0.2 MG/DL

## 2025-04-01 PROCEDURE — 99214 OFFICE O/P EST MOD 30 MIN: CPT | Mod: 25

## 2025-04-01 PROCEDURE — 36415 COLL VENOUS BLD VENIPUNCTURE: CPT

## 2025-04-01 PROCEDURE — G0439: CPT

## 2025-04-01 RX ORDER — NICOTINE POLACRILEX 2 MG/1
2 GUM, CHEWING BUCCAL
Qty: 1 | Refills: 2 | Status: ACTIVE | COMMUNITY
Start: 2025-04-01 | End: 1900-01-01

## 2025-04-02 LAB
CHOLEST SERPL-MCNC: 183 MG/DL
CREAT SPEC-SCNC: 43 MG/DL
CREAT/PROT UR: 0.2 RATIO
FRUCTOSAMINE SERPL-MCNC: 329 UMOL/L
HDLC SERPL-MCNC: 50 MG/DL
LDLC SERPL-MCNC: 121 MG/DL
NONHDLC SERPL-MCNC: 133 MG/DL
PLATELET # PLAS AUTO: 154 K/UL
PROT UR-MCNC: 9 MG/DL
TRIGL SERPL-MCNC: 64 MG/DL
TSH SERPL-ACNC: 1.09 UIU/ML

## 2025-04-07 ENCOUNTER — TRANSCRIPTION ENCOUNTER (OUTPATIENT)
Age: 65
End: 2025-04-07

## 2025-04-11 ENCOUNTER — TRANSCRIPTION ENCOUNTER (OUTPATIENT)
Age: 65
End: 2025-04-11

## 2025-04-14 ENCOUNTER — NON-APPOINTMENT (OUTPATIENT)
Age: 65
End: 2025-04-14

## 2025-04-14 VITALS — BODY MASS INDEX: 23.3 KG/M2 | WEIGHT: 145 LBS | HEIGHT: 66 IN

## 2025-04-14 DIAGNOSIS — F17.210 NICOTINE DEPENDENCE, CIGARETTES, UNCOMPLICATED: ICD-10-CM

## 2025-04-15 ENCOUNTER — APPOINTMENT (OUTPATIENT)
Dept: CT IMAGING | Facility: IMAGING CENTER | Age: 65
End: 2025-04-15

## 2025-04-26 ENCOUNTER — APPOINTMENT (OUTPATIENT)
Dept: CT IMAGING | Facility: IMAGING CENTER | Age: 65
End: 2025-04-26

## 2025-04-26 ENCOUNTER — OUTPATIENT (OUTPATIENT)
Dept: OUTPATIENT SERVICES | Facility: HOSPITAL | Age: 65
LOS: 1 days | End: 2025-04-26
Payer: COMMERCIAL

## 2025-04-26 DIAGNOSIS — Z98.890 OTHER SPECIFIED POSTPROCEDURAL STATES: Chronic | ICD-10-CM

## 2025-04-26 DIAGNOSIS — Z90.49 ACQUIRED ABSENCE OF OTHER SPECIFIED PARTS OF DIGESTIVE TRACT: Chronic | ICD-10-CM

## 2025-04-26 DIAGNOSIS — F17.210 NICOTINE DEPENDENCE, CIGARETTES, UNCOMPLICATED: ICD-10-CM

## 2025-04-26 PROCEDURE — 71271 CT THORAX LUNG CANCER SCR C-: CPT | Mod: 26

## 2025-04-26 PROCEDURE — 71271 CT THORAX LUNG CANCER SCR C-: CPT

## 2025-05-08 ENCOUNTER — APPOINTMENT (OUTPATIENT)
Dept: FAMILY MEDICINE | Facility: CLINIC | Age: 65
End: 2025-05-08

## 2025-05-12 ENCOUNTER — APPOINTMENT (OUTPATIENT)
Age: 65
End: 2025-05-12
Payer: SELF-PAY

## 2025-05-12 PROCEDURE — D6010: CPT | Mod: NC

## 2025-06-02 ENCOUNTER — APPOINTMENT (OUTPATIENT)
Age: 65
End: 2025-06-02
Payer: COMMERCIAL

## 2025-06-02 PROCEDURE — 99024 POSTOP FOLLOW-UP VISIT: CPT

## 2025-06-03 ENCOUNTER — APPOINTMENT (OUTPATIENT)
Dept: FAMILY MEDICINE | Facility: CLINIC | Age: 65
End: 2025-06-03
Payer: MEDICARE

## 2025-06-03 ENCOUNTER — NON-APPOINTMENT (OUTPATIENT)
Age: 65
End: 2025-06-03

## 2025-06-03 VITALS
OXYGEN SATURATION: 98 % | SYSTOLIC BLOOD PRESSURE: 138 MMHG | RESPIRATION RATE: 17 BRPM | BODY MASS INDEX: 23.46 KG/M2 | TEMPERATURE: 97 F | HEIGHT: 66 IN | WEIGHT: 146 LBS | DIASTOLIC BLOOD PRESSURE: 70 MMHG | HEART RATE: 70 BPM

## 2025-06-03 DIAGNOSIS — K70.30 ALCOHOLIC CIRRHOSIS OF LIVER W/OUT ASCITES: ICD-10-CM

## 2025-06-03 DIAGNOSIS — E78.5 HYPERLIPIDEMIA, UNSPECIFIED: ICD-10-CM

## 2025-06-03 DIAGNOSIS — E11.9 TYPE 2 DIABETES MELLITUS W/OUT COMPLICATIONS: ICD-10-CM

## 2025-06-03 DIAGNOSIS — K74.60 UNSPECIFIED CIRRHOSIS OF LIVER: ICD-10-CM

## 2025-06-03 PROCEDURE — 36415 COLL VENOUS BLD VENIPUNCTURE: CPT

## 2025-06-03 PROCEDURE — 99214 OFFICE O/P EST MOD 30 MIN: CPT | Mod: 25

## 2025-06-03 PROCEDURE — 93000 ELECTROCARDIOGRAM COMPLETE: CPT

## 2025-06-04 LAB
ALBUMIN SERPL ELPH-MCNC: 4.5 G/DL
ALP BLD-CCNC: 123 U/L
ALT SERPL-CCNC: 29 U/L
ANION GAP SERPL CALC-SCNC: 16 MMOL/L
AST SERPL-CCNC: 29 U/L
BILIRUB SERPL-MCNC: 0.2 MG/DL
BUN SERPL-MCNC: 12 MG/DL
CALCIUM SERPL-MCNC: 9.2 MG/DL
CHLORIDE SERPL-SCNC: 96 MMOL/L
CO2 SERPL-SCNC: 21 MMOL/L
CREAT SERPL-MCNC: 0.66 MG/DL
EGFRCR SERPLBLD CKD-EPI 2021: 104 ML/MIN/1.73M2
ESTIMATED AVERAGE GLUCOSE: 171 MG/DL
GLUCOSE SERPL-MCNC: 135 MG/DL
HBA1C MFR BLD HPLC: 7.6 %
LDLC SERPL DIRECT ASSAY-MCNC: 95 MG/DL
POTASSIUM SERPL-SCNC: 4.3 MMOL/L
PROT SERPL-MCNC: 7.2 G/DL
SODIUM SERPL-SCNC: 133 MMOL/L

## 2025-07-14 ENCOUNTER — APPOINTMENT (OUTPATIENT)
Dept: HEPATOLOGY | Facility: CLINIC | Age: 65
End: 2025-07-14

## 2025-07-16 ENCOUNTER — APPOINTMENT (OUTPATIENT)
Age: 65
End: 2025-07-16
Payer: COMMERCIAL

## 2025-07-16 PROCEDURE — D0274: CPT

## 2025-07-16 PROCEDURE — D0120: CPT

## 2025-07-16 PROCEDURE — D0220: CPT

## 2025-07-16 PROCEDURE — D1110 PROPHYLAXIS - ADULT: CPT

## 2025-07-18 ENCOUNTER — APPOINTMENT (OUTPATIENT)
Age: 65
End: 2025-07-18
Payer: COMMERCIAL

## 2025-07-18 PROCEDURE — D0171: CPT

## 2025-08-01 ENCOUNTER — APPOINTMENT (OUTPATIENT)
Age: 65
End: 2025-08-01

## 2025-08-11 ENCOUNTER — APPOINTMENT (OUTPATIENT)
Age: 65
End: 2025-08-11
Payer: MEDICAID

## 2025-08-11 PROCEDURE — D9310: CPT

## 2025-08-21 ENCOUNTER — APPOINTMENT (OUTPATIENT)
Age: 65
End: 2025-08-21
Payer: MEDICARE

## 2025-08-21 PROCEDURE — D3310: CPT

## 2025-08-26 ENCOUNTER — APPOINTMENT (OUTPATIENT)
Age: 65
End: 2025-08-26
Payer: COMMERCIAL

## 2025-08-26 PROCEDURE — D4341: CPT

## 2025-09-03 ENCOUNTER — APPOINTMENT (OUTPATIENT)
Age: 65
End: 2025-09-03
Payer: MEDICAID

## 2025-09-03 PROCEDURE — D4341: CPT

## (undated) DEVICE — SUT POLYSORB 0 18" GS-21

## (undated) DEVICE — DRAPE INSTRUMENT POUCH 6.75" X 11"

## (undated) DEVICE — NDL HYPO REGULAR BEVEL 25G X 1.5" (BLUE)

## (undated) DEVICE — DRAPE TOWEL BLUE 17" X 24"

## (undated) DEVICE — WARMING BLANKET UPPER ADULT

## (undated) DEVICE — SOL INJ NS 0.9% 500ML 2 PORT

## (undated) DEVICE — LIGASURE IMPACT

## (undated) DEVICE — CATH IV SAFE BC 22G X 1" (BLUE)

## (undated) DEVICE — ABDOMINAL BINDER XL 9" X 62"-74"

## (undated) DEVICE — PACK MAJOR ABDOMINAL SUPINE

## (undated) DEVICE — PACK IV START WITH CHG

## (undated) DEVICE — DRSG TEGADERM 6"X8"

## (undated) DEVICE — MEDICATION LABELS W MARKER

## (undated) DEVICE — LONE STAR RETRACTOR SQUARE 14.1CMX14.1CM DISP

## (undated) DEVICE — TUBING SUCTION CONN 6FT STERILE

## (undated) DEVICE — DRSG CURITY GAUZE SPONGE 4 X 4" 12-PLY

## (undated) DEVICE — NDL HYPO REGULAR BEVEL 22G X 1.5" (TURQUOISE)

## (undated) DEVICE — SUT MAXON 1 60" T-60

## (undated) DEVICE — PACK GENERAL MINOR

## (undated) DEVICE — GLV 8.5 PROTEXIS (WHITE)

## (undated) DEVICE — DRAIN PENROSE .5" X 18" LATEX

## (undated) DEVICE — GOWN TRIMAX LG

## (undated) DEVICE — SUT SURGIPRO 2-0 30" GS-22

## (undated) DEVICE — DRAIN PENROSE .25" X 18" LATEX

## (undated) DEVICE — SYR LUER LOK 10CC

## (undated) DEVICE — SPECIMEN CONTAINER 100ML

## (undated) DEVICE — POSITIONER FOAM EGG CRATE ULNAR 2PCS (PINK)

## (undated) DEVICE — SUT POLYSORB 2-0 30" V-20 UNDYED

## (undated) DEVICE — CATH IV SAFE INSYTE 14G X 1.75" (ORANGE)

## (undated) DEVICE — STAPLER SKIN VISI-STAT 35 WIDE

## (undated) DEVICE — BITE BLOCK ADULT 20 X 27MM (GREEN)

## (undated) DEVICE — BLADE SCALPEL SAFETYLOCK #10

## (undated) DEVICE — VENODYNE/SCD SLEEVE CALF LARGE

## (undated) DEVICE — TUBING IV SET GRAVITY 3Y 100" MACRO

## (undated) DEVICE — STAPLER COVIDIEN ENDO GIA SHORT HANDLE

## (undated) DEVICE — GLV 7 PROTEXIS (WHITE)

## (undated) DEVICE — CATH IV SAFE BC 20G X 1.16" (PINK)

## (undated) DEVICE — SOL IRR POUR NS 0.9% 500ML

## (undated) DEVICE — GLV 7.5 PROTEXIS (WHITE)

## (undated) DEVICE — SYR ALLIANCE II INFLATION 60ML

## (undated) DEVICE — SUT MONOCRYL 4-0 18" PS-2

## (undated) DEVICE — BLADE SCALPEL SAFETYLOCK #15

## (undated) DEVICE — SENSOR O2 FINGER ADULT

## (undated) DEVICE — BALLOON US ENDO

## (undated) DEVICE — MARKING PEN W RULER

## (undated) DEVICE — ABDOMINAL BINDER MED/LG 12" X 45"-62"

## (undated) DEVICE — PREP CHLORAPREP HI-LITE ORANGE 26ML

## (undated) DEVICE — DRSG STERISTRIPS 0.5 X 4"

## (undated) DEVICE — GLV 8 PROTEXIS (WHITE)

## (undated) DEVICE — GLV 6.5 PROTEXIS (WHITE)

## (undated) DEVICE — SOL IRR POUR H2O 250ML

## (undated) DEVICE — FOLEY HOLDER STATLOCK 2 WAY ADULT

## (undated) DEVICE — SUT POLYSORB 0 30" GS-23

## (undated) DEVICE — SUT POLYSORB 3-0 30" V-20 UNDYED

## (undated) DEVICE — LONE STAR ELASTIC STAY HOOK 5MM SHARP

## (undated) DEVICE — SUCTION YANKAUER NO CONTROL VENT

## (undated) DEVICE — TUBING SUCTION 20FT